# Patient Record
Sex: FEMALE | Race: WHITE | Employment: OTHER | RURAL
[De-identification: names, ages, dates, MRNs, and addresses within clinical notes are randomized per-mention and may not be internally consistent; named-entity substitution may affect disease eponyms.]

---

## 2017-01-19 ENCOUNTER — OFFICE VISIT (OUTPATIENT)
Dept: FAMILY MEDICINE CLINIC | Age: 57
End: 2017-01-19

## 2017-01-19 VITALS
SYSTOLIC BLOOD PRESSURE: 122 MMHG | HEART RATE: 88 BPM | DIASTOLIC BLOOD PRESSURE: 96 MMHG | RESPIRATION RATE: 17 BRPM | BODY MASS INDEX: 42.8 KG/M2 | OXYGEN SATURATION: 97 % | WEIGHT: 265.2 LBS

## 2017-01-19 DIAGNOSIS — J44.9 CHRONIC OBSTRUCTIVE PULMONARY DISEASE, UNSPECIFIED COPD TYPE (HCC): ICD-10-CM

## 2017-01-19 DIAGNOSIS — F32.A ANXIETY AND DEPRESSION: ICD-10-CM

## 2017-01-19 DIAGNOSIS — R03.0 ELEVATED BLOOD PRESSURE (NOT HYPERTENSION): ICD-10-CM

## 2017-01-19 DIAGNOSIS — F41.9 ANXIETY AND DEPRESSION: ICD-10-CM

## 2017-01-19 DIAGNOSIS — F41.9 ANXIETY: ICD-10-CM

## 2017-01-19 DIAGNOSIS — R60.0 LOCALIZED EDEMA: ICD-10-CM

## 2017-01-19 DIAGNOSIS — J44.1 CHRONIC OBSTRUCTIVE PULMONARY DISEASE WITH ACUTE EXACERBATION (HCC): ICD-10-CM

## 2017-01-19 DIAGNOSIS — R00.2 PALPITATION: ICD-10-CM

## 2017-01-19 DIAGNOSIS — E55.9 VITAMIN D DEFICIENCY: ICD-10-CM

## 2017-01-19 DIAGNOSIS — F51.01 PRIMARY INSOMNIA: ICD-10-CM

## 2017-01-19 DIAGNOSIS — F32.A DEPRESSION, UNSPECIFIED DEPRESSION TYPE: ICD-10-CM

## 2017-01-19 DIAGNOSIS — R35.0 URINARY FREQUENCY: Primary | ICD-10-CM

## 2017-01-19 DIAGNOSIS — R79.89 ELEVATED FERRITIN: ICD-10-CM

## 2017-01-19 DIAGNOSIS — E88.81 METABOLIC SYNDROME: ICD-10-CM

## 2017-01-19 DIAGNOSIS — F41.0 PANIC ANXIETY SYNDROME: ICD-10-CM

## 2017-01-19 DIAGNOSIS — J45.909 ASTHMA DEPENDENT ON SYSTEMIC STEROIDS: ICD-10-CM

## 2017-01-19 DIAGNOSIS — Z79.52 ASTHMA DEPENDENT ON SYSTEMIC STEROIDS: ICD-10-CM

## 2017-01-19 LAB
BILIRUB UR QL STRIP: NORMAL
GLUCOSE UR-MCNC: NEGATIVE MG/DL
KETONES P FAST UR STRIP-MCNC: NEGATIVE MG/DL
PH UR STRIP: 6 [PH] (ref 4.6–8)
PROT UR QL STRIP: NEGATIVE MG/DL
SP GR UR STRIP: 1.03 (ref 1–1.03)
UA UROBILINOGEN AMB POC: NORMAL (ref 0.2–1)
URINALYSIS CLARITY POC: NORMAL
URINALYSIS COLOR POC: NORMAL
URINE BLOOD POC: NEGATIVE
URINE LEUKOCYTES POC: NEGATIVE
URINE NITRITES POC: NEGATIVE

## 2017-01-19 RX ORDER — DILTIAZEM HYDROCHLORIDE 120 MG/1
CAPSULE, COATED, EXTENDED RELEASE ORAL
Refills: 0 | COMMUNITY
Start: 2016-10-21 | End: 2017-03-13

## 2017-01-19 RX ORDER — PHENAZOPYRIDINE HYDROCHLORIDE 200 MG/1
TABLET, FILM COATED ORAL
COMMUNITY
End: 2017-01-19 | Stop reason: SDUPTHER

## 2017-01-19 RX ORDER — ZOLPIDEM TARTRATE 5 MG/1
5 TABLET ORAL
Qty: 30 TAB | Refills: 2 | Status: SHIPPED | OUTPATIENT
Start: 2017-01-19 | End: 2017-08-04 | Stop reason: DRUGHIGH

## 2017-01-19 RX ORDER — NORTRIPTYLINE HYDROCHLORIDE 25 MG/1
CAPSULE ORAL
Refills: 0 | COMMUNITY
Start: 2016-11-28 | End: 2017-08-04 | Stop reason: SDUPTHER

## 2017-01-19 RX ORDER — HYDROCHLOROTHIAZIDE 12.5 MG/1
12.5 TABLET ORAL DAILY
Qty: 30 TAB | Refills: 2 | Status: SHIPPED | OUTPATIENT
Start: 2017-01-19 | End: 2017-01-19 | Stop reason: SDUPTHER

## 2017-01-19 RX ORDER — ALPRAZOLAM 0.5 MG/1
0.5 TABLET ORAL
Qty: 30 TAB | Refills: 2 | Status: SHIPPED | OUTPATIENT
Start: 2017-01-19 | End: 2017-06-27 | Stop reason: SDUPTHER

## 2017-01-19 RX ORDER — LORAZEPAM 1 MG/1
TABLET ORAL
Refills: 0 | COMMUNITY
Start: 2016-10-25 | End: 2017-02-20

## 2017-01-19 NOTE — MR AVS SNAPSHOT
Visit Information Date & Time Provider Department Dept. Phone Encounter #  
 1/19/2017 10:30 AM Sai Ashley, NP [address] [facility] [id number] Upcoming Health Maintenance Date Due Hepatitis C Screening 1960 COLONOSCOPY 12/4/1977 DTaP/Tdap/Td series (1 - Tdap) 12/4/1980 PAP AKA CERVICAL CYTOLOGY 12/4/1980 BREAST CANCER SCRN MAMMOGRAM 9/22/2017 Allergies as of 1/19/2017  Review Complete On: 1/19/2017 By: Figueroa Hennessy Severity Noted Reaction Type Reactions Pcn [Penicillins] High 11/14/2014    Rash Griseofulvin  11/14/2014    Other (comments) Aaron-dirk syndrome Pcn [Penicillins]  03/15/2013    Rash, Swelling Tramadol  08/19/2015    Nausea Only, Drowsiness Current Immunizations  Reviewed on 9/28/2016 Name Date Influenza High Dose Vaccine PF 9/28/2016, 9/24/2015 Pneumococcal Conjugate (PCV-13) 8/26/2015 Not reviewed this visit You Were Diagnosed With   
  
 Codes Comments Urinary frequency    -  Primary ICD-10-CM: R35.0 ICD-9-CM: 788.41 Vitamin D deficiency     ICD-10-CM: E55.9 ICD-9-CM: 268.9 Localized edema     ICD-10-CM: R60.0 ICD-9-CM: 166. 3 Elevated blood pressure (not hypertension)     ICD-10-CM: R03.0 ICD-9-CM: 796.2 Palpitation     ICD-10-CM: R00.2 ICD-9-CM: 785.1 Elevated ferritin     ICD-10-CM: R79.89 ICD-9-CM: 790.6 Metabolic syndrome     EHJ-09-OQ: F60.06 ICD-9-CM: 277.7 Vitals BP Pulse Resp Weight(growth percentile) SpO2 BMI  
 (!) 122/96 (BP 1 Location: Right arm, BP Patient Position: Sitting) 88 17 265 lb 3.2 oz (120.3 kg) 97% 42.8 kg/m2 OB Status Smoking Status Menopause Never Smoker Vitals History BMI and BSA Data Body Mass Index Body Surface Area  
 42.8 kg/m 2 2.37 m 2 Preferred Pharmacy Pharmacy Name Phone  [address], [address] [facility] DIMITRIS Benavides [phone] Your Updated Medication List  
  
   
This list is accurate as of: 1/19/17 11:48 AM.  Always use your most recent med list.  
  
  
  
  
 albuterol 90 mcg/actuation inhaler Commonly known as:  PROAIR HFA Take 2 Puffs by inhalation every four (4) hours as needed for Wheezing. albuterol-ipratropium 2.5 mg-0.5 mg/3 ml Nebu Commonly known as:  DUO-NEB  
3 mL by Nebulization route every four (4) hours. Indications: CHRONIC OBSTRUCTIVE PULMONARY DISEASE WITH BRONCHOSPASMS ALPRAZolam 0.5 mg tablet Commonly known as:  Mauricio Beecham Take 1 Tab by mouth nightly as needed for Anxiety. Max Daily Amount: 0.5 mg.  
  
 celecoxib 200 mg capsule Commonly known as:  CELEBREX 1 pill bid with food  
  
 cyclobenzaprine 10 mg tablet Commonly known as:  FLEXERIL Take 1 Tab by mouth three (3) times daily as needed for Muscle Spasm(s). dilTIAZem  mg ER capsule Commonly known as:  CARDIZEM CD  
take 1 capsule by mouth once daily  
  
 ergocalciferol 50,000 unit capsule Commonly known as:  ERGOCALCIFEROL Take 1 Cap by mouth every seven (7) days. esomeprazole 40 mg capsule Commonly known as:  Janeice [place] Take 1 Cap by mouth daily. hydroCHLOROthiazide 12.5 mg tablet Commonly known as:  HYDRODIURIL Take 1 Tab by mouth daily. inhalational spacing device 1 Each by Does Not Apply route as needed. LORazepam 1 mg tablet Commonly known as:  ATIVAN  
take 1 tablet by mouth 2 hours prior to procedure then may repeat. ..  (REFER TO PRESCRIPTION NOTES). mometasone-formoterol 200-5 mcg/actuation HFA inhaler Commonly known as:  Alverna Cable Take 2 Puffs by inhalation two (2) times a day. NORCO 7.5-325 mg per tablet Generic drug:  HYDROcodone-acetaminophen Take 1 Tab by mouth every six (6) hours as needed for Pain. nortriptyline 25 mg capsule Commonly known as:  PAMELOR  
 take 1 to 2 tablets by mouth at bedtime if needed Peak Flow Meter Karina  
1 Device by Does Not Apply route daily. sucralfate 100 mg/mL suspension Commonly known as:  Maria R Kirks Take 5 mL by mouth four (4) times daily. venlafaxine- mg capsule Commonly known as:  EFFEXOR-XR Take 1 Cap by mouth daily. Indications: nerves  
  
 zolpidem 5 mg tablet Commonly known as:  AMBIEN Take 1 Tab by mouth nightly as needed for Sleep. Max Daily Amount: 5 mg. Prescriptions Sent to Pharmacy Refills  
 hydroCHLOROthiazide (HYDRODIURIL) 12.5 mg tablet 2 Sig: Take 1 Tab by mouth daily. Class: Normal  
 Pharmacy: [facility] [place] [address], [facility] [address] Ph #: [phone] Route: Oral  
  
We Performed the Following AMB POC URINALYSIS DIP STICK AUTO W/O MICRO [43634 CPT(R)] CBC WITH AUTOMATED DIFF [63851 CPT(R)] FERRITIN [22908 CPT(R)] HEMOGLOBIN A1C WITH EAG [36040 CPT(R)] LIPID PANEL [16399 CPT(R)] METABOLIC PANEL, COMPREHENSIVE [68568 CPT(R)] NE COLLECTION VENOUS BLOOD,VENIPUNCTURE [id number] CPT(R)] REFERRAL TO UROLOGY [DBN678 Custom] Comments:  
 Please evaluate patient for interstitial cystitis. Recurrent dysuria Antonio [place] VITAMIN D, 25 HYDROXY [id number] CPT(R)] Referral Information Referral ID Referred By Referred To  
  
 [id number] Aleah Sheets [facility] [address] [facility], [address] Visits Status Start Date End Date 1 New Request 1/19/17 1/19/18 If your referral has a status of pending review or denied, additional information will be sent to support the outcome of this decision. Introducing \[facility] [place] [facility]\"" & HEALTH SERVICES! Dear Randee Osler: Thank you for requesting a [employer] account. Our records indicate that you already have an active [employer] account.   You can access your account anytime at [email or web address] Did you know that you can access your hospital and ER discharge instructions at any time in [facility]? You can also review all of your test results from your hospital stay or ER visit. Additional Information If you have questions, please visit the Frequently Asked Questions section of the [facility] website at [email or web address] Remember, [facility] is NOT to be used for urgent needs. For medical emergencies, dial 911. Now available from your iPhone and Android! Please provide this summary of care documentation to your next provider. Your primary care clinician is listed as Deirdre German. If you have any questions after today's visit, please call [phone].

## 2017-01-20 LAB
25(OH)D3+25(OH)D2 SERPL-MCNC: 8.7 NG/ML (ref 30–100)
ALBUMIN SERPL-MCNC: 4.4 G/DL (ref 3.5–5.5)
ALBUMIN/GLOB SERPL: 1.8 {RATIO} (ref 1.1–2.5)
ALP SERPL-CCNC: 99 IU/L (ref 39–117)
ALT SERPL-CCNC: 28 IU/L (ref 0–32)
AST SERPL-CCNC: 33 IU/L (ref 0–40)
BASOPHILS # BLD AUTO: 0.1 X10E3/UL (ref 0–0.2)
BASOPHILS NFR BLD AUTO: 1 %
BILIRUB SERPL-MCNC: 0.3 MG/DL (ref 0–1.2)
BUN SERPL-MCNC: 10 MG/DL (ref 6–24)
BUN/CREAT SERPL: 13 (ref 9–23)
CALCIUM SERPL-MCNC: 9.4 MG/DL (ref 8.7–10.2)
CHLORIDE SERPL-SCNC: 102 MMOL/L (ref 96–106)
CHOLEST SERPL-MCNC: 232 MG/DL (ref 100–199)
CO2 SERPL-SCNC: 21 MMOL/L (ref 18–29)
CREAT SERPL-MCNC: 0.8 MG/DL (ref 0.57–1)
EOSINOPHIL # BLD AUTO: 0.5 X10E3/UL (ref 0–0.4)
EOSINOPHIL NFR BLD AUTO: 7 %
ERYTHROCYTE [DISTWIDTH] IN BLOOD BY AUTOMATED COUNT: 14.8 % (ref 12.3–15.4)
EST. AVERAGE GLUCOSE BLD GHB EST-MCNC: 114 MG/DL
FERRITIN SERPL-MCNC: 15 NG/ML (ref 15–150)
GLOBULIN SER CALC-MCNC: 2.5 G/DL (ref 1.5–4.5)
GLUCOSE SERPL-MCNC: 78 MG/DL (ref 65–99)
HBA1C MFR BLD: 5.6 % (ref 4.8–5.6)
HCT VFR BLD AUTO: 45.9 % (ref 34–46.6)
HDLC SERPL-MCNC: 79 MG/DL
HGB BLD-MCNC: 15.3 G/DL (ref 11.1–15.9)
IMM GRANULOCYTES # BLD: 0 X10E3/UL (ref 0–0.1)
IMM GRANULOCYTES NFR BLD: 0 %
LDLC SERPL CALC-MCNC: 136 MG/DL (ref 0–99)
LYMPHOCYTES # BLD AUTO: 1.8 X10E3/UL (ref 0.7–3.1)
LYMPHOCYTES NFR BLD AUTO: 27 %
MCH RBC QN AUTO: 28.7 PG (ref 26.6–33)
MCHC RBC AUTO-ENTMCNC: 33.3 G/DL (ref 31.5–35.7)
MCV RBC AUTO: 86 FL (ref 79–97)
MONOCYTES # BLD AUTO: 0.4 X10E3/UL (ref 0.1–0.9)
MONOCYTES NFR BLD AUTO: 6 %
NEUTROPHILS # BLD AUTO: 4 X10E3/UL (ref 1.4–7)
NEUTROPHILS NFR BLD AUTO: 59 %
PLATELET # BLD AUTO: 353 X10E3/UL (ref 150–379)
POTASSIUM SERPL-SCNC: 4.9 MMOL/L (ref 3.5–5.2)
PROT SERPL-MCNC: 6.9 G/DL (ref 6–8.5)
RBC # BLD AUTO: 5.34 X10E6/UL (ref 3.77–5.28)
SODIUM SERPL-SCNC: 146 MMOL/L (ref 134–144)
TRIGL SERPL-MCNC: 83 MG/DL (ref 0–149)
VLDLC SERPL CALC-MCNC: 17 MG/DL (ref 5–40)
WBC # BLD AUTO: 6.8 X10E3/UL (ref 3.4–10.8)

## 2017-01-20 RX ORDER — ERGOCALCIFEROL 1.25 MG/1
50000 CAPSULE ORAL
Qty: 14 CAP | Refills: 1 | Status: SHIPPED | OUTPATIENT
Start: 2017-01-20 | End: 2017-03-13 | Stop reason: SDUPTHER

## 2017-01-20 RX ORDER — PHENAZOPYRIDINE HYDROCHLORIDE 200 MG/1
200 TABLET, FILM COATED ORAL
Qty: 90 TAB | Refills: 3 | Status: SHIPPED | OUTPATIENT
Start: 2017-01-20 | End: 2017-02-20

## 2017-01-20 RX ORDER — HYDROCHLOROTHIAZIDE 12.5 MG/1
12.5 TABLET ORAL DAILY
Qty: 30 TAB | Refills: 2 | Status: SHIPPED | OUTPATIENT
Start: 2017-01-20 | End: 2017-03-13

## 2017-01-21 NOTE — PROGRESS NOTES
1/23/2017    Chief Complaint   Patient presents with    Medication Refill    Bladder Infection     thinks she may have a bladder infection. HPI: Dilshad Crouch is a 61 y.o. female. Previous patient Jo Ann Francois. Presents with recurrent episode of dysuria. Urine today is negative. Review of labs indicate that she has had 2 UA dipsticks and 1 culture over the past 3 years. I do not find documentation of frequent UTIs. In fact the Urine culture done in 1/2016 was negative - \"no growth\". She needs refills of several of her medications. Complicated PMH:  Asthma/COPD - Severe Steroid dependent. Panic disorder  Depression  Fibromyalgia   Morbid obesity with history of Gastric bypass surgery. Anemia    Allergies   Allergen Reactions    Pcn [Penicillins] Rash    Griseofulvin Other (comments)     Aaron-dirk syndrome    Pcn [Penicillins] Rash and Swelling    Tramadol Nausea Only and Drowsiness       Current Outpatient Prescriptions   Medication Sig Dispense Refill    venlafaxine-SR (EFFEXOR-XR) 150 mg capsule Take 1 Cap by mouth daily. Indications: nerves 90 Cap 1    albuterol (PROAIR HFA) 90 mcg/actuation inhaler Take 2 Puffs by inhalation every four (4) hours as needed for Wheezing. 3 Inhaler 3    nortriptyline (PAMELOR) 25 mg capsule take 1 to 2 tablets by mouth at bedtime if needed  0    dilTIAZem CD (CARDIZEM CD) 120 mg ER capsule take 1 capsule by mouth once daily  0    LORazepam (ATIVAN) 1 mg tablet take 1 tablet by mouth 2 hours prior to procedure then may repeat. ..  (REFER TO PRESCRIPTION NOTES). 0    HYDROcodone-acetaminophen (NORCO) 7.5-325 mg per tablet Take 1 Tab by mouth every six (6) hours as needed for Pain.  celecoxib (CELEBREX) 200 mg capsule 1 pill bid with food 60 Cap 1    esomeprazole (NEXIUM) 40 mg capsule Take 1 Cap by mouth daily. 30 Cap 2    mometasone-formoterol (DULERA) 200-5 mcg/actuation HFA inhaler Take 2 Puffs by inhalation two (2) times a day.  3 Inhaler 3    inhalational spacing device 1 Each by Does Not Apply route as needed. 1 Device 0    Peak Flow Meter hussain 1 Device by Does Not Apply route daily. 1 Device 0    hydroCHLOROthiazide (HYDRODIURIL) 12.5 mg tablet Take 1 Tab by mouth daily. 30 Tab 2    ergocalciferol (ERGOCALCIFEROL) 50,000 unit capsule Take 1 Cap by mouth every seven (7) days. 14 Cap 1    phenazopyridine (PYRIDIUM) 200 mg tablet Take 1 Tab by mouth three (3) times daily as needed for Pain. 90 Tab 3    ALPRAZolam (XANAX) 0.5 mg tablet Take 1 Tab by mouth nightly as needed for Anxiety. Max Daily Amount: 0.5 mg. 30 Tab 2    zolpidem (AMBIEN) 5 mg tablet Take 1 Tab by mouth nightly as needed for Sleep. Max Daily Amount: 5 mg. 30 Tab 2    sucralfate (CARAFATE) 100 mg/mL suspension Take 5 mL by mouth four (4) times daily. 600 mL 5    cyclobenzaprine (FLEXERIL) 10 mg tablet Take 1 Tab by mouth three (3) times daily as needed for Muscle Spasm(s). 90 Tab 2    albuterol-ipratropium (DUO-NEB) 2.5 mg-0.5 mg/3 ml nebulizer solution 3 mL by Nebulization route every four (4) hours. Indications: CHRONIC OBSTRUCTIVE PULMONARY DISEASE WITH BRONCHOSPASMS 30 Vial 11       Past Medical History   Diagnosis Date    Anemia 3/15/2013    Arrhythmia      paroxsimal afib    Asthma 3/15/2013    Asthma     Back disorder      disc problem    Chronic obstructive pulmonary disease (HCC)     Morbid obesity ([facility] [address] 3/15/2013       Lab Results   Component Value Date/Time    Hemoglobin A1c 5.6 01/19/2017 11:53 AM    Hemoglobin A1c 6.0 06/11/2015 03:16 PM    Glucose 78 01/19/2017 11:53 AM    LDL, calculated 136 01/19/2017 11:53 AM    Creatinine 0.80 01/19/2017 11:53 AM       ROS:  Constitutional: No fever, chills or weight loss  Respiratory: No cough, SOB   CV: No chest pain or Palpitations  GI: No nausea, vomiting or diarrhea. : No dysuria or hematuria. Neuro: No headaches, seizures, change in mental status.     Physical Exam:   VS Visit Vitals    BP (!) 122/96 (BP 1 Location: Right arm, BP Patient Position: Sitting)    Pulse 88    Resp 17    Wt 265 lb 3.2 oz (120.3 kg)    SpO2 97%    BMI 42.8 kg/m2      General  Alert, oriented obese WF BMI 42.8. NAD   Eyes Conjunctiva and lids normal.    PERRLA, EOMI.   ENMT Mucous membranes moist.    Oropharynx: no erythema, no exudates, no lesions, normal tongue. NECK Thyroid: normal size, nontender. Trachea midline, neck symmetrical and without masses. Carotids 2+ with no bruits. No enlarged nodes. RESP Clear to auscultation and percussion. No rales, wheezes, rhonchi, or rubs. CV RRR, with no S3 or S4, no murmur, no rub. EXT Extremities without edema. SKIN Skin warm, normal turgor. NEURO Cranial nerves normal 2-12. No abnormal movement   PSYCH Judgment and insight fair. Oriented to person, place, and time. Affect is alert and attentive. 1. Urinary frequency/Recurrent Dysuria   Urine is negative today. I cannot find any documentation of UTI in past year. Consider possible Interstitial Cystitis: Refer Dr. Karli Wiley     - Shaunna Crow DIFF  - METABOLIC PANEL, COMPREHENSIVE  - WA COLLECTION VENOUS BLOOD,VENIPUNCTURE  - REFERRAL TO UROLOGY    2. Vitamin D deficiency  Not on meds. Start high dose Viyt D.  - METABOLIC PANEL, COMPREHENSIVE  - VITAMIN D, 25 HYDROXY  - WA COLLECTION VENOUS BLOOD,VENIPUNCTURE    3. Localized edema  None present today. Check labs. - METABOLIC PANEL, COMPREHENSIVE  - WA COLLECTION VENOUS BLOOD,VENIPUNCTURE    4. Elevated blood pressure (not hypertension)  Diastolic high today. Check labs  Continue current regimen  - CBC WITH AUTOMATED DIFF  - LIPID PANEL  - HEMOGLOBIN A1C WITH EAG  - METABOLIC PANEL, COMPREHENSIVE  - WA COLLECTION VENOUS BLOOD,VENIPUNCTURE    5. Palpitation  Check labs   - METABOLIC PANEL, COMPREHENSIVE  - WA COLLECTION VENOUS BLOOD,VENIPUNCTURE    6.  Elevated ferritin  Check labs   - FERRITIN  - METABOLIC PANEL, COMPREHENSIVE  - NH COLLECTION VENOUS BLOOD,VENIPUNCTURE    7. Metabolic syndrome  Check labs  - HEMOGLOBIN A1C WITH EAG  - NH COLLECTION VENOUS BLOOD,VENIPUNCTURE    8. Asthma/COPD  Severe steroid dependent  Refill albuterol inhaler. 9. Depression/ Anxiety  Refill Venlefaxine SR 150mg   #90 +3            Orders Placed This Encounter    CBC WITH AUTOMATED DIFF    FERRITIN    LIPID PANEL    HEMOGLOBIN A1C WITH EAG    METABOLIC PANEL, COMPREHENSIVE    VITAMIN D, 25 HYDROXY    REFERRAL TO UROLOGY     Referral Priority:   Routine     Referral Type:   Consultation     Referral Reason:   Specialty Services Required     Referral Location:   [facility]     Referred to Provider:   Pierce Watts MD    AMB POC URINALYSIS DIP STICK AUTO W/O MICRO    NH COLLECTION VENOUS BLOOD,VENIPUNCTURE    nortriptyline (PAMELOR) 25 mg capsule     Sig: take 1 to 2 tablets by mouth at bedtime if needed     Refill:  0    dilTIAZem CD (CARDIZEM CD) 120 mg ER capsule     Sig: take 1 capsule by mouth once daily     Refill:  0    LORazepam (ATIVAN) 1 mg tablet     Sig: take 1 tablet by mouth 2 hours prior to procedure then may repeat. ..  (REFER TO PRESCRIPTION NOTES). Refill:  0    DISCONTD: hydroCHLOROthiazide (HYDRODIURIL) 12.5 mg tablet     Sig: Take 1 Tab by mouth daily. Dispense:  30 Tab     Refill:  2    DISCONTD: phenazopyridine (PYRIDIUM) 200 mg tablet     Sig: Take  by mouth three (3) times daily as needed for Pain.  venlafaxine-SR (EFFEXOR-XR) 150 mg capsule     Sig: Take 1 Cap by mouth daily. Indications: nerves     Dispense:  90 Cap     Refill:  1    albuterol (PROAIR HFA) 90 mcg/actuation inhaler     Sig: Take 2 Puffs by inhalation every four (4) hours as needed for Wheezing. Dispense:  3 Inhaler     Refill:  3       Follow-up Disposition:  Return in about 2 weeks (around 2/2/2017).         DIYA Landrum

## 2017-01-23 RX ORDER — VENLAFAXINE HYDROCHLORIDE 150 MG/1
150 CAPSULE, EXTENDED RELEASE ORAL DAILY
Qty: 90 CAP | Refills: 1 | Status: SHIPPED | OUTPATIENT
Start: 2017-01-23 | End: 2017-03-08

## 2017-01-23 RX ORDER — ALBUTEROL SULFATE 90 UG/1
2 AEROSOL, METERED RESPIRATORY (INHALATION)
Qty: 3 INHALER | Refills: 3 | Status: ON HOLD | OUTPATIENT
Start: 2017-01-23 | End: 2017-03-24

## 2017-01-23 NOTE — PROGRESS NOTES
Abnormals: Total Cholesterol elevated 232 (high); HDL 79 (excellent); Vit D very low 8.7. You were started on Vit D 50,000 units once a week. Should recheck Vit in 6 mo. The A1C is 5.6% which is excellent, in normal range. 1 Year ago it was 6.0% which was pre-diabetic. You are no longer in the pre-diabetic range. Continue diet, exercise and wt loss. Although your cholesterol panel is a little high you do not need lipid lowering medication yet. However, over time you will need it. So continue low fat diet, low cholesterol diet. Limit saturated fats, cheese, sour cream, cream cheese, red meats. Avoid fried foods. Lean protein. Recheck labs in 3 mo.

## 2017-01-24 ENCOUNTER — TELEPHONE (OUTPATIENT)
Dept: FAMILY MEDICINE CLINIC | Age: 57
End: 2017-01-24

## 2017-01-24 RX ORDER — VENLAFAXINE HYDROCHLORIDE 150 MG/1
150 CAPSULE, EXTENDED RELEASE ORAL DAILY
Qty: 90 CAP | Refills: 1 | Status: SHIPPED | OUTPATIENT
Start: 2017-01-24 | End: 2017-03-13 | Stop reason: ALTCHOICE

## 2017-01-24 RX ORDER — ALBUTEROL SULFATE 90 UG/1
2 AEROSOL, METERED RESPIRATORY (INHALATION)
Qty: 3 INHALER | Refills: 3 | Status: SHIPPED | OUTPATIENT
Start: 2017-01-24

## 2017-01-24 NOTE — TELEPHONE ENCOUNTER
Kane Le called. [facility] has not called in her meds from the other day. Also , she is returning Rei's phone call. Wants [facility] to call her please.

## 2017-02-20 ENCOUNTER — OFFICE VISIT (OUTPATIENT)
Dept: FAMILY MEDICINE CLINIC | Age: 57
End: 2017-02-20

## 2017-02-20 VITALS
SYSTOLIC BLOOD PRESSURE: 118 MMHG | DIASTOLIC BLOOD PRESSURE: 88 MMHG | WEIGHT: 256.5 LBS | HEART RATE: 87 BPM | RESPIRATION RATE: 17 BRPM | OXYGEN SATURATION: 95 % | BODY MASS INDEX: 41.4 KG/M2

## 2017-02-20 DIAGNOSIS — R03.0 ELEVATED BLOOD PRESSURE (NOT HYPERTENSION): ICD-10-CM

## 2017-02-20 DIAGNOSIS — J44.1 CHRONIC OBSTRUCTIVE PULMONARY DISEASE WITH ACUTE EXACERBATION (HCC): Primary | ICD-10-CM

## 2017-02-20 NOTE — MR AVS SNAPSHOT
Visit Information Date & Time Provider Department Dept. Phone Encounter #  
 2/20/2017  1:30 PM Jeff Alan NP [address] [phone] [id number] Upcoming Health Maintenance Date Due Hepatitis C Screening 1960 COLONOSCOPY 12/4/1977 DTaP/Tdap/Td series (1 - Tdap) 12/4/1980 PAP AKA CERVICAL CYTOLOGY 12/4/1980 BREAST CANCER SCRN MAMMOGRAM 9/22/2017 Allergies as of 2/20/2017  Review Complete On: 2/20/2017 By: Braeden Benavides Severity Noted Reaction Type Reactions Pcn [Penicillins] High 11/14/2014    Rash Griseofulvin  11/14/2014    Other (comments) Aaron-dirk syndrome Pcn [Penicillins]  03/15/2013    Rash, Swelling Tramadol  08/19/2015    Nausea Only, Drowsiness Current Immunizations  Reviewed on 9/28/2016 Name Date Influenza High Dose Vaccine PF 9/28/2016, 9/24/2015 Pneumococcal Conjugate (PCV-13) 8/26/2015 Not reviewed this visit Vitals BP Pulse Resp Weight(growth percentile) SpO2 BMI  
 118/88 (BP 1 Location: Left arm, BP Patient Position: Sitting) 87 17 256 lb 8 oz (116.3 kg) 95% 41.4 kg/m2 OB Status Smoking Status Menopause Never Smoker Vitals History BMI and BSA Data Body Mass Index Body Surface Area  
 41.4 kg/m 2 2.33 m 2 Preferred Pharmacy Pharmacy Name Phone [facility] [place] [address], [place]  [address] [phone] Your Updated Medication List  
  
   
This list is accurate as of: 2/20/17  3:19 PM.  Always use your most recent med list.  
  
  
  
  
 * albuterol 90 mcg/actuation inhaler Commonly known as:  PROAIR HFA Take 2 Puffs by inhalation every four (4) hours as needed for Wheezing. * albuterol 90 mcg/actuation inhaler Commonly known as:  PROAIR HFA Take 2 Puffs by inhalation every four (4) hours as needed for Wheezing. albuterol-ipratropium 2.5 mg-0.5 mg/3 ml Nebu Commonly known as:  DUO-NEB  
3 mL by Nebulization route every four (4) hours. Indications: CHRONIC OBSTRUCTIVE PULMONARY DISEASE WITH BRONCHOSPASMS ALPRAZolam 0.5 mg tablet Commonly known as:  Mahesh Elvia Take 1 Tab by mouth nightly as needed for Anxiety. Max Daily Amount: 0.5 mg.  
  
 celecoxib 200 mg capsule Commonly known as:  CELEBREX 1 pill bid with food  
  
 cyclobenzaprine 10 mg tablet Commonly known as:  FLEXERIL Take 1 Tab by mouth three (3) times daily as needed for Muscle Spasm(s). dilTIAZem  mg ER capsule Commonly known as:  CARDIZEM CD  
take 1 capsule by mouth once daily  
  
 ergocalciferol 50,000 unit capsule Commonly known as:  ERGOCALCIFEROL Take 1 Cap by mouth every seven (7) days. esomeprazole 40 mg capsule Commonly known as:  Lafrances Edgar Take 1 Cap by mouth daily. hydroCHLOROthiazide 12.5 mg tablet Commonly known as:  HYDRODIURIL Take 1 Tab by mouth daily. inhalational spacing device 1 Each by Does Not Apply route as needed. mometasone-formoterol 200-5 mcg/actuation HFA inhaler Commonly known as:  Sandy Claudy Take 2 Puffs by inhalation two (2) times a day. NORCO 7.5-325 mg per tablet Generic drug:  HYDROcodone-acetaminophen Take 1 Tab by mouth every six (6) hours as needed for Pain. nortriptyline 25 mg capsule Commonly known as:  PAMELOR  
take 1 to 2 tablets by mouth at bedtime if needed Peak Flow Meter Karina  
1 Device by Does Not Apply route daily. sucralfate 100 mg/mL suspension Commonly known as:  Charm Ceferino Take 5 mL by mouth four (4) times daily. * venlafaxine- mg capsule Commonly known as:  EFFEXOR-XR Take 1 Cap by mouth daily. Indications: nerves * venlafaxine- mg capsule Commonly known as:  EFFEXOR-XR Take 1 Cap by mouth daily. Indications: nerves  
  
 zolpidem 5 mg tablet Commonly known as:  AMBIEN  
 Take 1 Tab by mouth nightly as needed for Sleep. Max Daily Amount: 5 mg.  
  
 * Notice: This list has 4 medication(s) that are the same as other medications prescribed for you. Read the directions carefully, and ask your doctor or other care provider to review them with you. Introducing [facility] SERVICES! Dear Patti Harris: Thank you for requesting a [employer] account. Our records indicate that you already have an active [employer] account. You can access your account anytime at [email or web address] Did you know that you can access your hospital and ER discharge instructions at any time in [employer]? You can also review all of your test results from your hospital stay or ER visit. Additional Information If you have questions, please visit the Frequently Asked Questions section of the [employer] website at [email or web address] Remember, [employer] is NOT to be used for urgent needs. For medical emergencies, dial 911. Now available from your iPhone and Android! Please provide this summary of care documentation to your next provider. Your primary care clinician is listed as Camilo Madrigal. If you have any questions after today's visit, please call [phone].

## 2017-02-21 NOTE — PROGRESS NOTES
2/21/2017    Chief Complaint   Patient presents with    Anemia       HPI: Stacy Drummond is a 61 y.o. female. 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 Currently on medical leave of absence for severe asthma. Seeing pulmonologist who is considering starting her on a new monoclonal antibody which targets Eosinophils. She had some questions which we discussed. She also has questions      Allergies   Allergen Reactions    Pcn [Penicillins] Rash    Griseofulvin Other (comments)     Aaron-dirk syndrome    Pcn [Penicillins] Rash and Swelling    Tramadol Nausea Only and Drowsiness       Current Outpatient Prescriptions   Medication Sig Dispense Refill    venlafaxine-SR (EFFEXOR-XR) 150 mg capsule Take 1 Cap by mouth daily. Indications: nerves 90 Cap 1    albuterol (PROAIR HFA) 90 mcg/actuation inhaler Take 2 Puffs by inhalation every four (4) hours as needed for Wheezing. 3 Inhaler 3    venlafaxine-SR (EFFEXOR-XR) 150 mg capsule Take 1 Cap by mouth daily. Indications: nerves 90 Cap 1    albuterol (PROAIR HFA) 90 mcg/actuation inhaler Take 2 Puffs by inhalation every four (4) hours as needed for Wheezing. 3 Inhaler 3    hydroCHLOROthiazide (HYDRODIURIL) 12.5 mg tablet Take 1 Tab by mouth daily. 30 Tab 2    ergocalciferol (ERGOCALCIFEROL) 50,000 unit capsule Take 1 Cap by mouth every seven (7) days. 14 Cap 1    nortriptyline (PAMELOR) 25 mg capsule take 1 to 2 tablets by mouth at bedtime if needed  0    dilTIAZem CD (CARDIZEM CD) 120 mg ER capsule take 1 capsule by mouth once daily  0    ALPRAZolam (XANAX) 0.5 mg tablet Take 1 Tab by mouth nightly as needed for Anxiety. Max Daily Amount: 0.5 mg. 30 Tab 2    HYDROcodone-acetaminophen (NORCO) 7.5-325 mg per tablet Take 1 Tab by mouth every six (6) hours as needed for Pain.  celecoxib (CELEBREX) 200 mg capsule 1 pill bid with food 60 Cap 1    sucralfate (CARAFATE) 100 mg/mL suspension Take 5 mL by mouth four (4) times daily.  600 mL 5    esomeprazole (NEXIUM) 40 mg capsule Take 1 Cap by mouth daily. 30 Cap 2    cyclobenzaprine (FLEXERIL) 10 mg tablet Take 1 Tab by mouth three (3) times daily as needed for Muscle Spasm(s). 90 Tab 2    mometasone-formoterol (DULERA) 200-5 mcg/actuation HFA inhaler Take 2 Puffs by inhalation two (2) times a day. 3 Inhaler 3    albuterol-ipratropium (DUO-NEB) 2.5 mg-0.5 mg/3 ml nebulizer solution 3 mL by Nebulization route every four (4) hours. Indications: CHRONIC OBSTRUCTIVE PULMONARY DISEASE WITH BRONCHOSPASMS 30 Vial 11    inhalational spacing device 1 Each by Does Not Apply route as needed. 1 Device 0    Peak Flow Meter hussain 1 Device by Does Not Apply route daily. 1 Device 0    zolpidem (AMBIEN) 5 mg tablet Take 1 Tab by mouth nightly as needed for Sleep. Max Daily Amount: 5 mg. 30 Tab 2       Past Medical History   Diagnosis Date    Anemia 3/15/2013    Arrhythmia      paroxsimal afib    Asthma 3/15/2013    Asthma     Back disorder      disc problem    Chronic obstructive pulmonary disease (HCC)     Morbid obesity ([facility] [place] [address] 3/15/2013       Lab Results   Component Value Date/Time    Hemoglobin A1c 5.6 01/19/2017 11:53 AM    Hemoglobin A1c 6.0 06/11/2015 03:16 PM    Glucose 78 01/19/2017 11:53 AM    LDL, calculated 136 01/19/2017 11:53 AM    Creatinine 0.80 01/19/2017 11:53 AM       ROS:  Constitutional: No fever, chills or weight loss  Respiratory: No cough, SOB   CV: No chest pain or Palpitations  GI: No nausea, vomiting or diarrhea. : No dysuria or hematuria. Neuro: No headaches, seizures, change in mental status. Physical Exam:   VS Visit Vitals    /88 (BP 1 Location: Left arm, BP Patient Position: Sitting)    Pulse 87    Resp 17    Wt 256 lb 8 oz (116.3 kg)    SpO2 95%    BMI 41.4 kg/m2      Eyes Conjunctiva and lids normal.    PERRLA, EOMI.   ENMT Mucous membranes moist.    Oropharynx: no erythema, no exudates, no lesions, normal tongue. NECK Thyroid: normal size, nontender.   Trachea midline, neck symmetrical and without masses. Carotids 2+ with no bruits. No enlarged nodes. RESP Clear to auscultation and percussion. No rales, wheezes, rhonchi, or rubs. CV RRR, with no S3 or S4, no murmur, no rub. Aorta: no bruit. GI   Normal bowel sounds, no bruit, soft, nontender, without masses. No hepatosplenomegaly. MSKEL Normal gait and station. Normal strength and tone, no atrophy. EXT No deformity. Extremities without edema. DP and PT 2+ bilaterally. SKIN Skin warm, normal turgor. NEURO Cranial nerves normal 2-12. No abnormal movement  Sensation vibration and filament   DTR 2+ in upper and lower extremities. PSYCH Judgment and insight good. Oriented to person, place, and time. Affect is alert and attentive. There are no diagnoses linked to this encounter. No orders of the defined types were placed in this encounter.       Follow-up Disposition: Not on File        Kingston Baker

## 2017-02-22 NOTE — PROGRESS NOTES
3/1/2017    Chief Complaint   Patient presents with    Anemia       HPI: Sam Ochoa is a 61 y.o. female. 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 Seeing pulmonologist who is considering starting her on a new monoclonal antibody which targets Eosinophils. She had some questions which we discussed. She is morbidly obese. 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 Reviewed diet,activity and portion control . Allergies   Allergen Reactions    Pcn [Penicillins] Rash    Griseofulvin Other (comments)     Aaron-dirk syndrome    Pcn [Penicillins] Rash and Swelling    Tramadol Nausea Only and Drowsiness       Current Outpatient Prescriptions   Medication Sig Dispense Refill    venlafaxine-SR (EFFEXOR-XR) 150 mg capsule Take 1 Cap by mouth daily. Indications: nerves 90 Cap 1    albuterol (PROAIR HFA) 90 mcg/actuation inhaler Take 2 Puffs by inhalation every four (4) hours as needed for Wheezing. 3 Inhaler 3    venlafaxine-SR (EFFEXOR-XR) 150 mg capsule Take 1 Cap by mouth daily. Indications: nerves 90 Cap 1    albuterol (PROAIR HFA) 90 mcg/actuation inhaler Take 2 Puffs by inhalation every four (4) hours as needed for Wheezing. 3 Inhaler 3    hydroCHLOROthiazide (HYDRODIURIL) 12.5 mg tablet Take 1 Tab by mouth daily. 30 Tab 2    ergocalciferol (ERGOCALCIFEROL) 50,000 unit capsule Take 1 Cap by mouth every seven (7) days. 14 Cap 1    nortriptyline (PAMELOR) 25 mg capsule take 1 to 2 tablets by mouth at bedtime if needed  0    dilTIAZem CD (CARDIZEM CD) 120 mg ER capsule take 1 capsule by mouth once daily  0    ALPRAZolam (XANAX) 0.5 mg tablet Take 1 Tab by mouth nightly as needed for Anxiety. Max Daily Amount: 0.5 mg. 30 Tab 2    HYDROcodone-acetaminophen (NORCO) 7.5-325 mg per tablet Take 1 Tab by mouth every six (6) hours as needed for Pain.       celecoxib (CELEBREX) 200 mg capsule 1 pill bid with food 60 Cap 1    sucralfate (CARAFATE) 100 mg/mL suspension Take 5 mL by mouth four (4) times daily. 600 mL 5    esomeprazole (NEXIUM) 40 mg capsule Take 1 Cap by mouth daily. 30 Cap 2    cyclobenzaprine (FLEXERIL) 10 mg tablet Take 1 Tab by mouth three (3) times daily as needed for Muscle Spasm(s). 90 Tab 2    mometasone-formoterol (DULERA) 200-5 mcg/actuation HFA inhaler Take 2 Puffs by inhalation two (2) times a day. 3 Inhaler 3    albuterol-ipratropium (DUO-NEB) 2.5 mg-0.5 mg/3 ml nebulizer solution 3 mL by Nebulization route every four (4) hours. Indications: CHRONIC OBSTRUCTIVE PULMONARY DISEASE WITH BRONCHOSPASMS 30 Vial 11    inhalational spacing device 1 Each by Does Not Apply route as needed. 1 Device 0    Peak Flow Meter hussain 1 Device by Does Not Apply route daily. 1 Device 0    zolpidem (AMBIEN) 5 mg tablet Take 1 Tab by mouth nightly as needed for Sleep. Max Daily Amount: 5 mg. 30 Tab 2       Past Medical History:   Diagnosis Date    Anemia 3/15/2013    Arrhythmia     paroxsimal afib    Asthma 3/15/2013    Asthma     Back disorder     disc problem    Chronic obstructive pulmonary disease (HCC)     Morbid obesity ([facility] [address] 3/15/2013       Lab Results   Component Value Date/Time    Hemoglobin A1c 5.6 01/19/2017 11:53 AM    Hemoglobin A1c 6.0 06/11/2015 03:16 PM    Glucose 78 01/19/2017 11:53 AM    LDL, calculated 136 01/19/2017 11:53 AM    Creatinine 0.80 01/19/2017 11:53 AM       ROS:  Constitutional: No fever, chills or weight loss  Respiratory: No cough, SOB   CV: No chest pain or Palpitations  GI: No nausea, vomiting or diarrhea. : No dysuria or hematuria. Neuro: No headaches, seizures, change in mental status.     Physical Exam:   VS Visit Vitals    /88 (BP 1 Location: Left arm, BP Patient Position: Sitting)    Pulse 87    Resp 17    Wt 256 lb 8 oz (116.3 kg)    SpO2 95%    BMI 41.4 kg/m2      Eyes Conjunctiva and lids normal.    PERRLA, EOMI.   ENMT   Lips, teeth, gums normal, mucous membranes moist.    Oropharynx: no erythema, no exudates, no lesions, normal tongue. NECK Thyroid: normal size, nontender. Trachea midline, neck symmetrical and without masses. Carotids 2+ with no bruits. No enlarged nodes. RESP Clear to auscultation and percussion. No rales, wheezes, rhonchi, or rubs. CV RRR, with no S3 or S4, no murmur, no rub. Aorta: no bruit. GI   Normal bowel sounds, no bruit, soft, nontender, without masses. No hepatosplenomegaly. MSKEL Normal gait and station. Normal strength and tone, no atrophy. EXT No deformity. Extremities without edema. DP and PT 2+ bilaterally. SKIN Skin warm, normal turgor. NEURO Cranial nerves normal 2-12. PSYCH Judgment and insight good. Oriented to person, place, and time. Affect is alert and attentive. 1. Chronic obstructive pulmonary disease with acute exacerbation [facility])  Per Pulmonology  Completed FMLA papers. Out of work until after next appt 6/19/17 pulmonology      2. Elevated blood pressure (not hypertension)  BP is normal      Follow-up Disposition:  Return in about 3 months (around 5/20/2017), or if symptoms worsen or fail to improve.         DIYA Grimaldo

## 2017-03-04 ENCOUNTER — HOSPITAL ENCOUNTER (INPATIENT)
Age: 57
LOS: 4 days | Discharge: HOME | DRG: 176 | End: 2017-03-08
Attending: EMERGENCY MEDICINE | Admitting: INTERNAL MEDICINE
Payer: COMMERCIAL

## 2017-03-04 ENCOUNTER — APPOINTMENT (OUTPATIENT)
Dept: CT IMAGING | Age: 57
DRG: 176 | End: 2017-03-04
Attending: EMERGENCY MEDICINE
Payer: COMMERCIAL

## 2017-03-04 ENCOUNTER — APPOINTMENT (OUTPATIENT)
Dept: GENERAL RADIOLOGY | Age: 57
DRG: 176 | End: 2017-03-04
Attending: EMERGENCY MEDICINE
Payer: COMMERCIAL

## 2017-03-04 DIAGNOSIS — R77.8 ELEVATED TROPONIN: ICD-10-CM

## 2017-03-04 DIAGNOSIS — I26.09 OTHER ACUTE PULMONARY EMBOLISM WITH ACUTE COR PULMONALE (HCC): Primary | ICD-10-CM

## 2017-03-04 PROBLEM — I26.99 ACUTE PULMONARY EMBOLISM (HCC): Status: ACTIVE | Noted: 2017-03-04

## 2017-03-04 LAB
ALBUMIN SERPL BCP-MCNC: 3.2 G/DL (ref 3.5–5)
ALBUMIN/GLOB SERPL: 1.1 {RATIO} (ref 1.1–2.2)
ALP SERPL-CCNC: 84 U/L (ref 45–117)
ALT SERPL-CCNC: 16 U/L (ref 12–78)
ANION GAP BLD CALC-SCNC: 8 MMOL/L (ref 5–15)
APPEARANCE UR: CLEAR
AST SERPL W P-5'-P-CCNC: 17 U/L (ref 15–37)
BACTERIA URNS QL MICRO: NEGATIVE /HPF
BASOPHILS # BLD AUTO: 0.1 K/UL (ref 0–0.1)
BASOPHILS # BLD: 1 % (ref 0–1)
BILIRUB SERPL-MCNC: 0.4 MG/DL (ref 0.2–1)
BILIRUB UR QL: NEGATIVE
BUN SERPL-MCNC: 9 MG/DL (ref 6–20)
BUN/CREAT SERPL: 9 (ref 12–20)
CALCIUM SERPL-MCNC: 8.9 MG/DL (ref 8.5–10.1)
CHLORIDE SERPL-SCNC: 108 MMOL/L (ref 97–108)
CK MB CFR SERPL CALC: 5.5 % (ref 0–2.5)
CK MB CFR SERPL CALC: 6.1 % (ref 0–2.5)
CK MB SERPL-MCNC: 3 NG/ML (ref 5–25)
CK MB SERPL-MCNC: 3 NG/ML (ref 5–25)
CK SERPL-CCNC: 49 U/L (ref 26–192)
CK SERPL-CCNC: 55 U/L (ref 26–192)
CO2 SERPL-SCNC: 27 MMOL/L (ref 21–32)
COLOR UR: NORMAL
CREAT SERPL-MCNC: 0.96 MG/DL (ref 0.55–1.02)
D DIMER PPP FEU-MCNC: 4.77 MG/L FEU (ref 0–0.65)
EOSINOPHIL # BLD: 0.4 K/UL (ref 0–0.4)
EOSINOPHIL NFR BLD: 5 % (ref 0–7)
EPITH CASTS URNS QL MICRO: NORMAL /LPF
ERYTHROCYTE [DISTWIDTH] IN BLOOD BY AUTOMATED COUNT: 14.2 % (ref 11.5–14.5)
FIBRINOGEN PPP-MCNC: 353 MG/DL (ref 200–475)
GLOBULIN SER CALC-MCNC: 2.9 G/DL (ref 2–4)
GLUCOSE SERPL-MCNC: 98 MG/DL (ref 65–100)
GLUCOSE UR STRIP.AUTO-MCNC: NEGATIVE MG/DL
HCT VFR BLD AUTO: 44.3 % (ref 35–47)
HGB BLD-MCNC: 14 G/DL (ref 11.5–16)
HGB UR QL STRIP: NEGATIVE
KETONES UR QL STRIP.AUTO: NEGATIVE MG/DL
LEUKOCYTE ESTERASE UR QL STRIP.AUTO: NEGATIVE
LYMPHOCYTES # BLD AUTO: 33 % (ref 12–49)
LYMPHOCYTES # BLD: 2.7 K/UL (ref 0.8–3.5)
MCH RBC QN AUTO: 28.3 PG (ref 26–34)
MCHC RBC AUTO-ENTMCNC: 31.6 G/DL (ref 30–36.5)
MCV RBC AUTO: 89.7 FL (ref 80–99)
MONOCYTES # BLD: 0.6 K/UL (ref 0–1)
MONOCYTES NFR BLD AUTO: 7 % (ref 5–13)
NEUTS SEG # BLD: 4.5 K/UL (ref 1.8–8)
NEUTS SEG NFR BLD AUTO: 54 % (ref 32–75)
NITRITE UR QL STRIP.AUTO: NEGATIVE
PH UR STRIP: 6.5 [PH] (ref 5–8)
PLATELET # BLD AUTO: 209 K/UL (ref 150–400)
POTASSIUM SERPL-SCNC: 4.2 MMOL/L (ref 3.5–5.1)
PROT SERPL-MCNC: 6.1 G/DL (ref 6.4–8.2)
PROT UR STRIP-MCNC: NEGATIVE MG/DL
RBC # BLD AUTO: 4.94 M/UL (ref 3.8–5.2)
RBC #/AREA URNS HPF: NORMAL /HPF (ref 0–5)
SODIUM SERPL-SCNC: 143 MMOL/L (ref 136–145)
SP GR UR REFRACTOMETRY: <1.005 (ref 1–1.03)
TROPONIN I SERPL-MCNC: 0.32 NG/ML
TROPONIN I SERPL-MCNC: 0.34 NG/ML
TROPONIN I SERPL-MCNC: 0.34 NG/ML
UA: UC IF INDICATED,UAUC: NORMAL
UROBILINOGEN UR QL STRIP.AUTO: 0.2 EU/DL (ref 0.2–1)
WBC # BLD AUTO: 8.3 K/UL (ref 3.6–11)
WBC URNS QL MICRO: NORMAL /HPF (ref 0–4)

## 2017-03-04 PROCEDURE — 85613 RUSSELL VIPER VENOM DILUTED: CPT | Performed by: INTERNAL MEDICINE

## 2017-03-04 PROCEDURE — 74011250636 HC RX REV CODE- 250/636: Performed by: EMERGENCY MEDICINE

## 2017-03-04 PROCEDURE — 85025 COMPLETE CBC W/AUTO DIFF WBC: CPT | Performed by: EMERGENCY MEDICINE

## 2017-03-04 PROCEDURE — 86038 ANTINUCLEAR ANTIBODIES: CPT | Performed by: INTERNAL MEDICINE

## 2017-03-04 PROCEDURE — 77030029684 HC NEB SM VOL KT MONA -A

## 2017-03-04 PROCEDURE — 36415 COLL VENOUS BLD VENIPUNCTURE: CPT | Performed by: EMERGENCY MEDICINE

## 2017-03-04 PROCEDURE — 71275 CT ANGIOGRAPHY CHEST: CPT

## 2017-03-04 PROCEDURE — 85379 FIBRIN DEGRADATION QUANT: CPT | Performed by: EMERGENCY MEDICINE

## 2017-03-04 PROCEDURE — 94640 AIRWAY INHALATION TREATMENT: CPT

## 2017-03-04 PROCEDURE — 74011250637 HC RX REV CODE- 250/637: Performed by: INTERNAL MEDICINE

## 2017-03-04 PROCEDURE — 65660000000 HC RM CCU STEPDOWN

## 2017-03-04 PROCEDURE — 85306 CLOT INHIBIT PROT S FREE: CPT | Performed by: INTERNAL MEDICINE

## 2017-03-04 PROCEDURE — 81001 URINALYSIS AUTO W/SCOPE: CPT | Performed by: EMERGENCY MEDICINE

## 2017-03-04 PROCEDURE — 80053 COMPREHEN METABOLIC PANEL: CPT | Performed by: EMERGENCY MEDICINE

## 2017-03-04 PROCEDURE — 71010 XR CHEST PORT: CPT

## 2017-03-04 PROCEDURE — 85384 FIBRINOGEN ACTIVITY: CPT | Performed by: INTERNAL MEDICINE

## 2017-03-04 PROCEDURE — 85300 ANTITHROMBIN III ACTIVITY: CPT | Performed by: INTERNAL MEDICINE

## 2017-03-04 PROCEDURE — 93005 ELECTROCARDIOGRAM TRACING: CPT

## 2017-03-04 PROCEDURE — 74011250637 HC RX REV CODE- 250/637: Performed by: EMERGENCY MEDICINE

## 2017-03-04 PROCEDURE — 85732 THROMBOPLASTIN TIME PARTIAL: CPT | Performed by: INTERNAL MEDICINE

## 2017-03-04 PROCEDURE — 84484 ASSAY OF TROPONIN QUANT: CPT | Performed by: EMERGENCY MEDICINE

## 2017-03-04 PROCEDURE — 85598 HEXAGNAL PHOSPH PLTLT NEUTRL: CPT | Performed by: INTERNAL MEDICINE

## 2017-03-04 PROCEDURE — 74011000250 HC RX REV CODE- 250: Performed by: EMERGENCY MEDICINE

## 2017-03-04 PROCEDURE — 85303 CLOT INHIBIT PROT C ACTIVITY: CPT | Performed by: INTERNAL MEDICINE

## 2017-03-04 PROCEDURE — 74011636637 HC RX REV CODE- 636/637: Performed by: EMERGENCY MEDICINE

## 2017-03-04 PROCEDURE — 74011636320 HC RX REV CODE- 636/320: Performed by: EMERGENCY MEDICINE

## 2017-03-04 PROCEDURE — 99285 EMERGENCY DEPT VISIT HI MDM: CPT

## 2017-03-04 PROCEDURE — 81241 F5 GENE: CPT | Performed by: INTERNAL MEDICINE

## 2017-03-04 PROCEDURE — 82550 ASSAY OF CK (CPK): CPT | Performed by: EMERGENCY MEDICINE

## 2017-03-04 RX ORDER — PANTOPRAZOLE SODIUM 40 MG/1
40 TABLET, DELAYED RELEASE ORAL DAILY
Status: DISCONTINUED | OUTPATIENT
Start: 2017-03-04 | End: 2017-03-08 | Stop reason: HOSPADM

## 2017-03-04 RX ORDER — ENOXAPARIN SODIUM 100 MG/ML
1 INJECTION SUBCUTANEOUS
Status: COMPLETED | OUTPATIENT
Start: 2017-03-04 | End: 2017-03-04

## 2017-03-04 RX ORDER — ENOXAPARIN SODIUM 100 MG/ML
1 INJECTION SUBCUTANEOUS EVERY 12 HOURS
Status: DISCONTINUED | OUTPATIENT
Start: 2017-03-05 | End: 2017-03-08 | Stop reason: HOSPADM

## 2017-03-04 RX ORDER — GUAIFENESIN 100 MG/5ML
324 LIQUID (ML) ORAL
Status: COMPLETED | OUTPATIENT
Start: 2017-03-04 | End: 2017-03-04

## 2017-03-04 RX ORDER — ALPRAZOLAM 0.5 MG/1
0.5 TABLET ORAL
Status: DISCONTINUED | OUTPATIENT
Start: 2017-03-04 | End: 2017-03-08 | Stop reason: HOSPADM

## 2017-03-04 RX ORDER — SODIUM CHLORIDE 0.9 % (FLUSH) 0.9 %
10 SYRINGE (ML) INJECTION
Status: COMPLETED | OUTPATIENT
Start: 2017-03-04 | End: 2017-03-04

## 2017-03-04 RX ORDER — IPRATROPIUM BROMIDE AND ALBUTEROL SULFATE 2.5; .5 MG/3ML; MG/3ML
3 SOLUTION RESPIRATORY (INHALATION)
Status: COMPLETED | OUTPATIENT
Start: 2017-03-04 | End: 2017-03-04

## 2017-03-04 RX ORDER — NORTRIPTYLINE HYDROCHLORIDE 25 MG/1
25 CAPSULE ORAL
Status: DISCONTINUED | OUTPATIENT
Start: 2017-03-04 | End: 2017-03-08 | Stop reason: HOSPADM

## 2017-03-04 RX ORDER — SUCRALFATE 1 G/10ML
0.5 SUSPENSION ORAL 4 TIMES DAILY
Status: DISCONTINUED | OUTPATIENT
Start: 2017-03-04 | End: 2017-03-08 | Stop reason: HOSPADM

## 2017-03-04 RX ORDER — IPRATROPIUM BROMIDE AND ALBUTEROL SULFATE 2.5; .5 MG/3ML; MG/3ML
3 SOLUTION RESPIRATORY (INHALATION)
Status: DISCONTINUED | OUTPATIENT
Start: 2017-03-04 | End: 2017-03-08 | Stop reason: HOSPADM

## 2017-03-04 RX ORDER — DILTIAZEM HYDROCHLORIDE 120 MG/1
120 CAPSULE, COATED, EXTENDED RELEASE ORAL DAILY
Status: DISCONTINUED | OUTPATIENT
Start: 2017-03-05 | End: 2017-03-08 | Stop reason: HOSPADM

## 2017-03-04 RX ORDER — SODIUM CHLORIDE 9 MG/ML
50 INJECTION, SOLUTION INTRAVENOUS
Status: COMPLETED | OUTPATIENT
Start: 2017-03-04 | End: 2017-03-04

## 2017-03-04 RX ORDER — PREDNISONE 20 MG/1
60 TABLET ORAL
Status: COMPLETED | OUTPATIENT
Start: 2017-03-04 | End: 2017-03-04

## 2017-03-04 RX ORDER — MORPHINE SULFATE 2 MG/ML
2 INJECTION, SOLUTION INTRAMUSCULAR; INTRAVENOUS
Status: DISCONTINUED | OUTPATIENT
Start: 2017-03-04 | End: 2017-03-08 | Stop reason: HOSPADM

## 2017-03-04 RX ORDER — ONDANSETRON 2 MG/ML
4 INJECTION INTRAMUSCULAR; INTRAVENOUS
Status: DISCONTINUED | OUTPATIENT
Start: 2017-03-04 | End: 2017-03-08 | Stop reason: HOSPADM

## 2017-03-04 RX ORDER — ZOLPIDEM TARTRATE 5 MG/1
5 TABLET ORAL
Status: DISCONTINUED | OUTPATIENT
Start: 2017-03-04 | End: 2017-03-08 | Stop reason: HOSPADM

## 2017-03-04 RX ORDER — ACETAMINOPHEN 325 MG/1
650 TABLET ORAL
Status: DISCONTINUED | OUTPATIENT
Start: 2017-03-04 | End: 2017-03-08 | Stop reason: HOSPADM

## 2017-03-04 RX ORDER — LABETALOL HYDROCHLORIDE 5 MG/ML
10 INJECTION, SOLUTION INTRAVENOUS
Status: DISCONTINUED | OUTPATIENT
Start: 2017-03-04 | End: 2017-03-08 | Stop reason: HOSPADM

## 2017-03-04 RX ORDER — VENLAFAXINE HYDROCHLORIDE 150 MG/1
150 CAPSULE, EXTENDED RELEASE ORAL DAILY
Status: DISCONTINUED | OUTPATIENT
Start: 2017-03-04 | End: 2017-03-08 | Stop reason: HOSPADM

## 2017-03-04 RX ORDER — CYCLOBENZAPRINE HCL 10 MG
10 TABLET ORAL
Status: DISCONTINUED | OUTPATIENT
Start: 2017-03-04 | End: 2017-03-08 | Stop reason: HOSPADM

## 2017-03-04 RX ORDER — FLUTICASONE FUROATE AND VILANTEROL 200; 25 UG/1; UG/1
1 POWDER RESPIRATORY (INHALATION) DAILY
Status: DISCONTINUED | OUTPATIENT
Start: 2017-03-05 | End: 2017-03-08 | Stop reason: HOSPADM

## 2017-03-04 RX ORDER — OXYCODONE AND ACETAMINOPHEN 5; 325 MG/1; MG/1
1 TABLET ORAL
Status: DISCONTINUED | OUTPATIENT
Start: 2017-03-04 | End: 2017-03-08 | Stop reason: HOSPADM

## 2017-03-04 RX ADMIN — NITROGLYCERIN 0.5 INCH: 20 OINTMENT TOPICAL at 16:48

## 2017-03-04 RX ADMIN — ACETAMINOPHEN 650 MG: 325 TABLET, FILM COATED ORAL at 21:41

## 2017-03-04 RX ADMIN — IOPAMIDOL 100 ML: 755 INJECTION, SOLUTION INTRAVENOUS at 17:59

## 2017-03-04 RX ADMIN — ENOXAPARIN SODIUM 120 MG: 40 INJECTION SUBCUTANEOUS at 19:31

## 2017-03-04 RX ADMIN — VENLAFAXINE HYDROCHLORIDE 150 MG: 150 CAPSULE, EXTENDED RELEASE ORAL at 23:14

## 2017-03-04 RX ADMIN — PREDNISONE 60 MG: 20 TABLET ORAL at 15:32

## 2017-03-04 RX ADMIN — PANTOPRAZOLE SODIUM 40 MG: 40 TABLET, DELAYED RELEASE ORAL at 23:14

## 2017-03-04 RX ADMIN — ASPIRIN 81 MG CHEWABLE TABLET 324 MG: 81 TABLET CHEWABLE at 16:48

## 2017-03-04 RX ADMIN — IPRATROPIUM BROMIDE AND ALBUTEROL SULFATE 3 ML: .5; 3 SOLUTION RESPIRATORY (INHALATION) at 15:17

## 2017-03-04 RX ADMIN — SODIUM CHLORIDE 50 ML/HR: 900 INJECTION, SOLUTION INTRAVENOUS at 17:59

## 2017-03-04 RX ADMIN — NORTRIPTYLINE HYDROCHLORIDE 25 MG: 25 CAPSULE ORAL at 23:14

## 2017-03-04 RX ADMIN — OXYCODONE HYDROCHLORIDE AND ACETAMINOPHEN 1 TABLET: 5; 325 TABLET ORAL at 23:14

## 2017-03-04 RX ADMIN — SUCRALFATE 0.5 G: 1 SUSPENSION ORAL at 21:42

## 2017-03-04 RX ADMIN — Medication 10 ML: at 17:59

## 2017-03-04 RX ADMIN — IPRATROPIUM BROMIDE AND ALBUTEROL SULFATE 3 ML: .5; 3 SOLUTION RESPIRATORY (INHALATION) at 15:40

## 2017-03-04 NOTE — ED NOTES
Assumed care of patient. Patient placed in position of comfort. Call bell in reach. Skin warm and dry. Respirations even and unlabored. In no apparent distress at this time. Pt presents ambulatory into the ED with c/o SOB and congestion x yesterday afternoon. A&O x 4. Placed on cardiac monitor x 3.

## 2017-03-04 NOTE — ED PROVIDER NOTES
HPI Comments: Ana Burch is a 61 y.o. female with PMHx of anemia, asthma, COPD, and arrhythmia presenting ambulatory to ED [facility] c/o SOB since yesterday afternoon. She reports additional symptom of wheezing with her SOB. Pt notes that as she was getting out of the car yesterday, walking into her house she started developing SOB. Her SOB continued through the night, and into the morning. Pt notes that her SOB is exacerbated with exertion. She reports that she has a hx of asthma and COPD, but she has never had SOB this severe. Pt notes that she has been using her rescue inhaler with no relief. She reports that she has a pulse oximeter at home, and at home her O2 Sats were in the 60%s and she had blue colored lips. Pt used a nebulizer treatment ~1 and a half hours ago which brought her O2 Sats to 90%, but then her Sats started to decline again. In route to ED, she used 2 L O2 which provided relief, but pt notes that normally she is not on O2. Earlier yesterday, pt went to physical therapy, and was fine throughout her appointment. She reports that she had a stress test a couple months ago, and a bronchoscopy recently. Pt denies fever, chills, chest pain, hx of DVT, hx of PE, or recent steroid use. PCP: Mari Gibbons NP    There are no other complaints, changes, or physical findings at this time. The history is provided by the patient. No  was used.         Past Medical History:   Diagnosis Date    Anemia 3/15/2013    Arrhythmia     paroxsimal afib    Asthma 3/15/2013    Asthma     Back disorder     disc problem    Chronic obstructive pulmonary disease (HCC)     Morbid obesity ([facility] [address] 3/15/2013       Past Surgical History:   Procedure Laterality Date    HX ADENOIDECTOMY      HX APPENDECTOMY      HX GASTRIC BYPASS  1-15-98    Dr. Des Gaston HX ORTHOPAEDIC      arm fracture    HX TONSILLECTOMY      HX TUBAL LIGATION  79    HX TUBAL LIGATION           Family History:   Problem Relation Age of Onset   [address] [facility] Cancer Sister      lung    Other Mother      fem pop bypass    Bleeding Prob Mother     Heart Disease Mother     Cancer Father      pancreatic    Cancer Brother        Social History     Social History    Marital status:      Spouse name: N/A    Number of children: N/A    Years of education: N/A     Occupational History    Not on file. Social History Main Topics    Smoking status: Never Smoker    Smokeless tobacco: Never Used    Alcohol use Yes    Drug use: No    Sexual activity: No     Other Topics Concern    Not on file     Social History Narrative    ** Merged History Encounter **              ALLERGIES: Pcn [penicillins]; Griseofulvin; Pcn [penicillins]; and Tramadol    Review of Systems   Constitutional: Negative for appetite change, chills and fever. HENT: Negative for congestion. Eyes: Negative for visual disturbance. Respiratory: Positive for shortness of breath and wheezing. Negative for cough. Cardiovascular: Negative for chest pain, palpitations and leg swelling. Gastrointestinal: Negative for abdominal pain. Genitourinary: Negative for dysuria, frequency and urgency. Musculoskeletal: Negative for back pain, joint swelling, myalgias and neck stiffness. Skin: Negative for rash. Neurological: Negative for dizziness, syncope, weakness and headaches. Hematological: Negative for adenopathy. Psychiatric/Behavioral: Negative for behavioral problems and dysphoric mood. All other systems reviewed and are negative.       Patient Vitals for the past 12 hrs:   Temp Pulse Resp BP SpO2   03/04/17 2128 98.1 °F (36.7 °C) (!) 110 22 131/89 95 %   03/04/17 2000 - (!) 119 22 109/64 92 %   03/04/17 1914 - (!) 122 16 - 94 %   03/04/17 1738 - (!) 125 20 102/63 -   03/04/17 1700 - (!) 127 21 152/66 94 %   03/04/17 1624 - (!) 110 - 150/79 98 %   03/04/17 1600 - (!) 109 14 135/71 97 %   03/04/17 1545 - 92 10 126/82 98 %   03/04/17 1448 97.4 °F (36.3 °C) 94 22 (!) 148/98 96 %            Physical Exam   Constitutional: She is oriented to person, place, and time. She appears well-nourished. No distress. Morbidly obese   HENT:   Head: Normocephalic and atraumatic. Mouth/Throat: Oropharynx is clear and moist.   Eyes: Conjunctivae and EOM are normal. Pupils are equal, round, and reactive to light. No scleral icterus. Neck: Normal range of motion. Neck supple. Cardiovascular: Regular rhythm. Tachycardia present. Exam reveals no gallop. No murmur heard. Pulmonary/Chest: Effort normal. No stridor. No respiratory distress. She has wheezes. She has no rales. Bilateral basilar expiratory wheezing   Abdominal: Soft. Bowel sounds are normal. She exhibits no distension and no mass. There is no tenderness. There is no rebound and no guarding. Musculoskeletal: Normal range of motion. She exhibits no edema. Lymphadenopathy:     She has no cervical adenopathy. Neurological: She is alert and oriented to person, place, and time. No cranial nerve deficit. Coordination normal.   Skin: Skin is warm and dry. No rash noted. No erythema. Psychiatric: Her mood appears anxious. Nursing note and vitals reviewed.        MDM  Number of Diagnoses or Management Options  Elevated troponin:   Other acute pulmonary embolism with acute cor pulmonale [facility]):   Diagnosis management comments: DDx: asthma exacerbation, COPD exacerbation, CHF, pneumonia        Amount and/or Complexity of Data Reviewed  Clinical lab tests: ordered and reviewed  Tests in the radiology section of CPT®: ordered and reviewed  Tests in the medicine section of CPT®: ordered and reviewed  Review and summarize past medical records: yes  Discuss the patient with other providers: yes (Hospitalist)  Independent visualization of images, tracings, or specimens: yes    Critical Care  Total time providing critical care: 30-74 minutes    Patient Progress  Patient progress: stable    ED Course       Procedures     EKG interpretation: (Preliminary)  Rhythm: sinus tachycardia; and regular . Rate (approx.): 101; Axis: normal; P wave: normal; QRS interval: normal ; ST/T wave: non-specific changes; Other findings: old inferior infarct. 4:47 PM  Pt remains SOB, though O2 saturation is normal. TnI is elevated. Will check d-dimer, recheck TnI at 3 hours, cover with ASA and NTG. Ernesto Warner MD    5:30 PM  D-dimer elevated; will send for CTA chest  Xena Bassett MD    6:48 PM  CTA confirms extensive right side PE with right heart strain. Will anticoagulate and admit. Xena Bassett MD      CRITICAL CARE NOTE:    7:02 PM      IMPENDING DETERIORATION -Respiratory and Cardiovascular    ASSOCIATED RISK FACTORS - Hypotension, Hypoxia, Dysrhythmia and Vascular Compromise    MANAGEMENT- Bedside Assessment    INTERPRETATION -  Xrays, CT Scan, ECG, Blood Pressure and Cardiac Output Measures     INTERVENTIONS - vascular control    CASE REVIEW - Hospitalist, Nursing and Family    TREATMENT RESPONSE -Stable    PERFORMED BY - Self      NOTES   :      I have spent 45 minutes of critical care time involved in lab review, consultations with specialist, family decision- making, bedside attention and documentation. During this entire length of time I was immediately available to the patient . Ernesto Warner MD    CONSULT NOTE:   7:03 PM  Ernesto Warner MD spoke with Dr. Harsh Stoddard,   Specialty: Hospitalist  Discussed pt's hx, disposition, and available diagnostic and imaging results. Reviewed care plans. Consultant will evaluate pt for admission.   Written by Alexandr Garza, ED Scribe, as dictated by Ernesto Warner MD.    LABORATORY TESTS:  Recent Results (from the past 12 hour(s))   EKG, 12 LEAD, INITIAL    Collection Time: 03/04/17  2:58 PM   Result Value Ref Range    Ventricular Rate 101 BPM    Atrial Rate 101 BPM    P-R Interval 112 ms    QRS Duration 84 ms    Q-T Interval 338 ms QTC Calculation (Bezet) 438 ms    Calculated P Axis 48 degrees    Calculated R Axis -2 degrees    Calculated T Axis 39 degrees    Diagnosis       Sinus tachycardia  Possible Inferior infarct , age undetermined  When compared with ECG of 17-FEB-2004 05:41,  Previous ECG has undetermined rhythm, needs review     URINALYSIS W/ REFLEX CULTURE    Collection Time: 03/04/17  3:46 PM   Result Value Ref Range    Color YELLOW/STRAW      Appearance CLEAR CLEAR      Specific gravity <1.005 1.003 - 1.030    pH (UA) 6.5 5.0 - 8.0      Protein NEGATIVE  NEG mg/dL    Glucose NEGATIVE  NEG mg/dL    Ketone NEGATIVE  NEG mg/dL    Bilirubin NEGATIVE  NEG      Blood NEGATIVE  NEG      Urobilinogen 0.2 0.2 - 1.0 EU/dL    Nitrites NEGATIVE  NEG      Leukocyte Esterase NEGATIVE  NEG      WBC 0-4 0 - 4 /hpf    RBC 0-5 0 - 5 /hpf    Epithelial cells FEW FEW /lpf    Bacteria NEGATIVE  NEG /hpf    UA:UC IF INDICATED CULTURE NOT INDICATED BY UA RESULT CNI     CBC WITH AUTOMATED DIFF    Collection Time: 03/04/17  3:46 PM   Result Value Ref Range    WBC 8.3 3.6 - 11.0 K/uL    RBC 4.94 3.80 - 5.20 M/uL    HGB 14.0 11.5 - 16.0 g/dL    HCT 44.3 35.0 - 47.0 %    MCV 89.7 80.0 - 99.0 FL    MCH 28.3 26.0 - 34.0 PG    MCHC 31.6 30.0 - 36.5 g/dL    RDW 14.2 11.5 - 14.5 %    PLATELET 709 183 - 306 K/uL    NEUTROPHILS 54 32 - 75 %    LYMPHOCYTES 33 12 - 49 %    MONOCYTES 7 5 - 13 %    EOSINOPHILS 5 0 - 7 %    BASOPHILS 1 0 - 1 %    ABS. NEUTROPHILS 4.5 1.8 - 8.0 K/UL    ABS. LYMPHOCYTES 2.7 0.8 - 3.5 K/UL    ABS. MONOCYTES 0.6 0.0 - 1.0 K/UL    ABS. EOSINOPHILS 0.4 0.0 - 0.4 K/UL    ABS.  BASOPHILS 0.1 0.0 - 0.1 K/UL   METABOLIC PANEL, COMPREHENSIVE    Collection Time: 03/04/17  3:46 PM   Result Value Ref Range    Sodium 143 136 - 145 mmol/L    Potassium 4.2 3.5 - 5.1 mmol/L    Chloride 108 97 - 108 mmol/L    CO2 27 21 - 32 mmol/L    Anion gap 8 5 - 15 mmol/L    Glucose 98 65 - 100 mg/dL    BUN 9 6 - 20 MG/DL    Creatinine 0.96 0.55 - 1.02 MG/DL BUN/Creatinine ratio 9 (L) 12 - 20      GFR est AA >60 >60 ml/min/1.73m2    GFR est non-AA 60 (L) >60 ml/min/1.73m2    Calcium 8.9 8.5 - 10.1 MG/DL    Bilirubin, total 0.4 0.2 - 1.0 MG/DL    ALT (SGPT) 16 12 - 78 U/L    AST (SGOT) 17 15 - 37 U/L    Alk. phosphatase 84 45 - 117 U/L    Protein, total 6.1 (L) 6.4 - 8.2 g/dL    Albumin 3.2 (L) 3.5 - 5.0 g/dL    Globulin 2.9 2.0 - 4.0 g/dL    A-G Ratio 1.1 1.1 - 2.2     TROPONIN I    Collection Time: 03/04/17  3:46 PM   Result Value Ref Range    Troponin-I, Qt. 0.32 (H) <0.05 ng/mL   CK W/ CKMB & INDEX    Collection Time: 03/04/17  3:46 PM   Result Value Ref Range    CK 55 26 - 192 U/L    CK - MB 3.0 <3.6 NG/ML    CK-MB Index 5.5 (H) 0 - 2.5     D DIMER    Collection Time: 03/04/17  3:47 PM   Result Value Ref Range    D-dimer 4.77 (H) 0.00 - 0.65 mg/L FEU   CK W/ CKMB & INDEX    Collection Time: 03/04/17  6:10 PM   Result Value Ref Range    CK 49 26 - 192 U/L    CK - MB 3.0 <3.6 NG/ML    CK-MB Index 6.1 (H) 0 - 2.5     TROPONIN I    Collection Time: 03/04/17  6:10 PM   Result Value Ref Range    Troponin-I, Qt. 0.34 (H) <0.05 ng/mL   FIBRINOGEN    Collection Time: 03/04/17  8:15 PM   Result Value Ref Range    Fibrinogen 353 200 - 475 mg/dL   TROPONIN I    Collection Time: 03/04/17  8:15 PM   Result Value Ref Range    Troponin-I, Qt. 0.34 (H) <0.05 ng/mL       IMAGING RESULTS:    PORTABLE CHEST RADIOGRAPH/S: 3/4/2017 4:44 PM     INDICATION: Dyspnea. HISTORY (per electronic medical record): Dyspnea and congestion yesterday  afternoon.     COMPARISON: None.     TECHNIQUE: Portable frontal upright radiograph/s of the chest.     FINDINGS:   The lungs are clear and the central airways are patent. No pneumothorax or large  pleural effusion. Bilateral acromioclavicular osteoarthritis.      IMPRESSION  IMPRESSION:   Clear lungs.       CT ANGIOGRAPHY CHEST. 3/4/2017 5:56 PM      INDICATION: Dyspnea, elevated d-dimer.    HISTORY (per electronic medical record): Dyspnea and congestion for a few days.     COMPARISON: 11/4/2016, same day chest radiograph.     TECHNIQUE: CT angiography of the chest was performed after the administration of  100 cc IV contrast (Isovue 370). Coronal and sagittal, and coronal MIP  reconstructions were performed. CT dose reduction was achieved through use of a  standardized protocol tailored for this examination and automatic exposure  control for dose modulation.     FINDINGS:  Thrombus in the right main pulmonary artery extends into the interlobar, right  lower lobe, and right upper lobe pulmonary arteries. There are smaller segmental  and subsegmental PEs in the right lung. A smaller PE is present in the superior  lingular segment of the left upper lobe (601-55, 602-110). There are probably  subsegmental PEs in the left lower lobe, though contrast bolus timing is  suboptimal. Right heart dilation and bowing of the septum toward the left  ventricle (2-61) is consistent with right heart strain.     Aside from minimal left basilar atelectasis, the lungs are clear. The central  airways are patent. No pneumothorax or pleural effusion. No thoracic  lymphadenopathy. Post Pricila-en-Y gastric bypass and cholecystectomy. The gastric  pouch is herniated into the chest.     IMPRESSION  IMPRESSION:   1. Right pulmonary artery PE, with extension into the interlobar, right lower  lobe, and right upper lobe pulmonary arteries. Smaller segmental and  subsegmental pulmonary emboli in the bilateral lungs. 2. Right heart strain.     The findings were called to Dr. Mary Guajardo on 3/4/2017 6:55 PM by Dr. Afsaneh Brooks.  [id number]          MEDICATIONS GIVEN:  Medications   ALPRAZolam (XANAX) tablet 0.5 mg (not administered)   cyclobenzaprine (FLEXERIL) tablet 10 mg (not administered)   dilTIAZem CD (CARDIZEM CD) capsule 120 mg (not administered)   fluticasone-vilanterol (BREO ELLIPTA) 200mcg-25mcg/puff (not administered)   sucralfate (CARAFATE) 100 mg/mL oral suspension 0.5 g (0.5 g Oral Given 3/4/17 2142)   venlafaxine-SR (EFFEXOR-XR) capsule 150 mg (150 mg Oral Given 3/4/17 2314)   zolpidem (AMBIEN) tablet 5 mg (not administered)   enoxaparin (LOVENOX) injection 120 mg (not administered)   albuterol-ipratropium (DUO-NEB) 2.5 MG-0.5 MG/3 ML (not administered)   labetalol (NORMODYNE;TRANDATE) 20 mg/4 mL (5 mg/mL) injection 10 mg (not administered)   acetaminophen (TYLENOL) tablet 650 mg (650 mg Oral Given 3/4/17 2141)   oxyCODONE-acetaminophen (PERCOCET) 5-325 mg per tablet 1 Tab (1 Tab Oral Given 3/4/17 2314)   morphine injection 2 mg (not administered)   ondansetron (ZOFRAN) injection 4 mg (not administered)   pantoprazole (PROTONIX) tablet 40 mg (40 mg Oral Given 3/4/17 2314)   nortriptyline (PAMELOR) capsule 25 mg (25 mg Oral Given 3/4/17 2314)   predniSONE (DELTASONE) tablet 60 mg (60 mg Oral Given 3/4/17 1532)   albuterol-ipratropium (DUO-NEB) 2.5 MG-0.5 MG/3 ML (3 mL Nebulization Given 3/4/17 1540)   aspirin chewable tablet 324 mg (324 mg Oral Given 3/4/17 1648)   nitroglycerin (NITROBID) 2 % ointment 0.5 Inch (0.5 Inches Topical Given 3/4/17 1648)   sodium chloride (NS) flush 10 mL (10 mL IntraVENous Given 3/4/17 1759)   iopamidol (ISOVUE-370) 76 % injection 100 mL (100 mL IntraVENous Given 3/4/17 1759)   0.9% sodium chloride infusion (50 mL/hr IntraVENous New Bag 3/4/17 1759)   enoxaparin (LOVENOX) injection 120 mg (120 mg SubCUTAneous Given 3/4/17 1931)       IMPRESSION:  1. Other acute pulmonary embolism with acute cor pulmonale (HCC)    2. Elevated troponin        PLAN:  1. Admit to hospitalist    ADMIT NOTE:  7:03 PM  Patient is being admitted to the hospital by Dr. Baylee Self. The results of their tests and reasons for their admission have been discussed with the patient and/or available family. They convey agreement and understanding for the need to be admitted and for their admission diagnosis.         This note is prepared by Kendall Dooley, acting as Scribe for Bird Stafford MD Shona Armas MD: The scribe's documentation has been prepared under my direction and personally reviewed by me in its entirety. I confirm that the note above accurately reflects all work, treatment, procedures, and medical decision making performed by me.

## 2017-03-04 NOTE — IP AVS SNAPSHOT
Current Discharge Medication List  
  
Take these medications at their scheduled times Dose & Instructions Dispensing Information Comments Morning Noon Evening Bedtime  
 albuterol-ipratropium 2.5 mg-0.5 mg/3 ml Nebu Commonly known as:  Juan Carlos Quiñones Your next dose is: Today, Tomorrow Other:  ____________ Dose:  3 mL  
3 mL by Nebulization route every four (4) hours. Indications: CHRONIC OBSTRUCTIVE PULMONARY DISEASE WITH BRONCHOSPASMS Quantity:  30 Vial  
Refills:  11 Dx [id number]  
    
   
   
   
  
 enoxaparin 120 mg/0.8 mL injection Commonly known as:  LOVENOX Your next dose is: Today, Tomorrow Other:  ____________ Dose:  120 mg  
120 mg by SubCUTAneous route every twelve (12) hours for 7 days. Quantity:  14 Syringe Refills:  0  
     
   
   
   
  
 ergocalciferol 50,000 unit capsule Commonly known as:  ERGOCALCIFEROL Your next dose is: Today, Tomorrow Other:  ____________ Dose:  80953 Units Take 1 Cap by mouth every seven (7) days. Quantity:  14 Cap Refills:  1  
     
   
   
   
  
 esomeprazole 40 mg capsule Commonly known as:  Jordis Jannet Your next dose is: Today, Tomorrow Other:  ____________ Dose:  40 mg Take 1 Cap by mouth daily. Quantity:  30 Cap Refills:  2  
     
   
   
   
  
 hydroCHLOROthiazide 12.5 mg tablet Commonly known as:  HYDRODIURIL Your next dose is: Today, Tomorrow Other:  ____________ Dose:  12.5 mg Take 1 Tab by mouth daily. Quantity:  30 Tab Refills:  2  
     
   
   
   
  
 mometasone-formoterol 200-5 mcg/actuation HFA inhaler Commonly known as:  Delano Ros Your next dose is: Today, Tomorrow Other:  ____________ Dose:  2 Puff Take 2 Puffs by inhalation two (2) times a day. Quantity:  3 Inhaler Refills:  3 Peak Flow Meter Sunny Avitia Your next dose is: Today, Tomorrow Other:  ____________ Dose:  1 Device 1 Device by Does Not Apply route daily. Quantity:  1 Device Refills:  0  
     
   
   
   
  
 sucralfate 100 mg/mL suspension Commonly known as:  Tree Cole Your next dose is: Today, Tomorrow Other:  ____________ Dose:  1 tsp Take 5 mL by mouth four (4) times daily. Quantity:  600 mL Refills:  5  
     
   
   
   
  
 venlafaxine- mg capsule Commonly known as:  EFFEXOR-XR Your next dose is: Today, Tomorrow Other:  ____________ Dose:  150 mg Take 1 Cap by mouth daily. Indications: nerves Quantity:  90 Cap Refills:  1 Take these medications as needed Dose & Instructions Dispensing Information Comments Morning Noon Evening Bedtime * albuterol 90 mcg/actuation inhaler Commonly known as:  PROAIR HFA Your next dose is: Today, Tomorrow Other:  ____________ Dose:  2 Puff Take 2 Puffs by inhalation every four (4) hours as needed for Wheezing. Quantity:  3 Inhaler Refills:  3  
     
   
   
   
  
 * albuterol 90 mcg/actuation inhaler Commonly known as:  PROAIR HFA Your next dose is: Today, Tomorrow Other:  ____________ Dose:  2 Puff Take 2 Puffs by inhalation every four (4) hours as needed for Wheezing. Quantity:  3 Inhaler Refills:  3 ALPRAZolam 0.5 mg tablet Commonly known as:  Eddie [place] Your next dose is: Today, Tomorrow Other:  ____________ Dose:  0.5 mg Take 1 Tab by mouth nightly as needed for Anxiety. Max Daily Amount: 0.5 mg.  
 Quantity:  30 Tab Refills:  2  
     
   
   
   
  
 cyclobenzaprine 10 mg tablet Commonly known as:  FLEXERIL Your next dose is: Today, Tomorrow Other:  ____________  Dose:  10 mg  
 Take 1 Tab by mouth three (3) times daily as needed for Muscle Spasm(s). Quantity:  90 Tab Refills:  2  
     
   
   
   
  
 inhalational spacing device Your next dose is: Today, Tomorrow Other:  ____________ Dose:  1 Each  
1 Each by Does Not Apply route as needed. Quantity:  1 Device Refills:  0 NORCO 7.5-325 mg per tablet Generic drug:  HYDROcodone-acetaminophen Your next dose is: Today, Tomorrow Other:  ____________ Dose:  1 Tab Take 1 Tab by mouth every six (6) hours as needed for Pain. Refills:  0  
     
   
   
   
  
 zolpidem 5 mg tablet Commonly known as:  AMBIEN Your next dose is: Today, Tomorrow Other:  ____________ Dose:  5 mg Take 1 Tab by mouth nightly as needed for Sleep. Max Daily Amount: 5 mg. Quantity:  30 Tab Refills:  2  
     
   
   
   
  
 * Notice: This list has 2 medication(s) that are the same as other medications prescribed for you. Read the directions carefully, and ask your doctor or other care provider to review them with you. Take these medications as directed Dose & Instructions Dispensing Information Comments Morning Noon Evening Bedtime  
 dilTIAZem  mg ER capsule Commonly known as:  CARDIZEM CD Your next dose is: Today, Tomorrow Other:  ____________  
   
   
 take 1 capsule by mouth once daily Refills:  0  
     
   
   
   
  
 nortriptyline 25 mg capsule Commonly known as:  PAMELOR Your next dose is: Today, Tomorrow Other:  ____________  
   
   
 take 1 to 2 tablets by mouth at bedtime if needed Refills:  0  
     
   
   
   
  
 warfarin 5 mg tablet Commonly known as:  COUMADIN Your next dose is: Today, Tomorrow Other:  ____________ Take 7.5mg daily or as instructed Quantity:  60 Tab Refills:  0 Where to Get Your Medications Information about where to get these medications is not yet available ! Ask your nurse or doctor about these medications  
  enoxaparin 120 mg/0.8 mL injection  
 warfarin 5 mg tablet

## 2017-03-04 NOTE — IP AVS SNAPSHOT
[address] [facility] 
[phone] Patient: Amanda Holly MRN: [id number] [id number] You are allergic to the following Allergen Reactions Pcn (Penicillins) Rash Griseofulvin Other (comments) Aaron-dirk syndrome Pcn (Penicillins) Rash Swelling Tramadol Nausea Only Drowsiness Recent Documentation Height Weight BMI OB Status Smoking Status 1.676 m 117.3 kg 41.74 kg/m2 Menopause Never Smoker Unresulted Labs Order Current Status FACTOR V LEIDEN In process Emergency Contacts Name Discharge Info Relation Home Work [facility] CAREGIVER [3] Child [2] [phone] About your hospitalization You were admitted on:  March 4, 2017 You last received care in the:  [facility] 2 CARDIOPULMONARY CARE You were discharged on:  March 8, 2017 Unit phone number:  [phone] Why you were hospitalized Your primary diagnosis was:  Not on File Your diagnoses also included:  Acute Pulmonary Embolism (Hcc), Morbid Obesity (Hcc), Chronic Obstructive Pulmonary Disease (Hcc), Troponin I Above Reference Range Providers Seen During Your Hospitalizations Provider Role Specialty Primary office phone James Travis MD Attending Provider Emergency Medicine [phone] Vu Emanuel MD Attending Provider Internal Medicine [phone] Your Primary Care Physician (PCP) Primary Care Physician Office Phone Office Fax MARIA FERNANDA ALVARADO ** None ** ** None ** Follow-up Information Follow up With Details Comments Contact Info Naomi Medina NP Go on 3/14/2017 APPOINTMENT TIME: 2:00 PM [address] [facility] [phone] Your Appointments  Monday March 13, 2017  1:00 PM EDT  
PT GENERAL F/U with Megan Newberry PT  
 [facility] OP REHAB [facility]) [facility] [address] [address] [phone] Tuesday March 14, 2017  2:00 PM EDT  
ESTABLISHED PATIENT with EDILSON Salazar ([facility]) [address] [facility] [phone] Friday March 17, 2017  1:00 PM EDT  
PT GENERAL F/U with Erroll Counts, PT  
[facility] OP [facility]) [facility] [address] [address] [phone] Current Discharge Medication List  
  
START taking these medications Dose & Instructions Dispensing Information Comments Morning Noon Evening Bedtime  
 enoxaparin 120 mg/0.8 mL injection Commonly known as:  LOVENOX Your next dose is: Today, Tomorrow Other:  _________ Dose:  120 mg  
120 mg by SubCUTAneous route every twelve (12) hours for 7 days. Quantity:  14 Syringe Refills:  0  
     
   
   
   
  
 warfarin 5 mg tablet Commonly known as:  COUMADIN Your next dose is: Today, Tomorrow Other:  _________ Take 7.5mg daily or as instructed Quantity:  60 Tab Refills:  0 CONTINUE these medications which have CHANGED Dose & Instructions Dispensing Information Comments Morning Noon Evening Bedtime  
 venlafaxine- mg capsule Commonly known as:  EFFEXOR-XR What changed:  Another medication with the same name was removed. Continue taking this medication, and follow the directions you see here. Your next dose is: Today, Tomorrow Other:  _________ Dose:  150 mg Take 1 Cap by mouth daily. Indications: nerves Quantity:  90 Cap Refills:  1 CONTINUE these medications which have NOT CHANGED Dose & Instructions Dispensing Information Comments Morning Noon Evening Bedtime * albuterol 90 mcg/actuation inhaler Commonly known as:  PROAIR HFA Your next dose is: Today, Tomorrow Other:  _________ Dose:  2 Puff Take 2 Puffs by inhalation every four (4) hours as needed for Wheezing. Quantity:  3 Inhaler Refills:  3  
     
   
   
   
  
 * albuterol 90 mcg/actuation inhaler Commonly known as:  PROAIR HFA Your next dose is: Today, Tomorrow Other:  _________ Dose:  2 Puff Take 2 Puffs by inhalation every four (4) hours as needed for Wheezing. Quantity:  3 Inhaler Refills:  3  
     
   
   
   
  
 albuterol-ipratropium 2.5 mg-0.5 mg/3 ml Nebu Commonly known as:  Gilberto Embs Your next dose is: Today, Tomorrow Other:  _________ Dose:  3 mL  
3 mL by Nebulization route every four (4) hours. Indications: CHRONIC OBSTRUCTIVE PULMONARY DISEASE WITH BRONCHOSPASMS Quantity:  30 Vial  
Refills:  11 Dx j44.9 ALPRAZolam 0.5 mg tablet Commonly known as:  Pixie Oz Your next dose is: Today, Tomorrow Other:  _________ Dose:  0.5 mg Take 1 Tab by mouth nightly as needed for Anxiety. Max Daily Amount: 0.5 mg.  
 Quantity:  30 Tab Refills:  2  
     
   
   
   
  
 cyclobenzaprine 10 mg tablet Commonly known as:  FLEXERIL Your next dose is: Today, Tomorrow Other:  _________ Dose:  10 mg Take 1 Tab by mouth three (3) times daily as needed for Muscle Spasm(s). Quantity:  90 Tab Refills:  2  
     
   
   
   
  
 dilTIAZem  mg ER capsule Commonly known as:  CARDIZEM CD Your next dose is: Today, Tomorrow Other:  _________  
   
   
 take 1 capsule by mouth once daily Refills:  0  
     
   
   
   
  
 ergocalciferol 50,000 unit capsule Commonly known as:  ERGOCALCIFEROL Your next dose is: Today, Tomorrow Other:  _________ Dose:  08600 Units Take 1 Cap by mouth every seven (7) days. Quantity:  14 Cap Refills:  1 esomeprazole 40 mg capsule Commonly known as:  Marium Simper Your next dose is: Today, Tomorrow Other:  _________ Dose:  40 mg Take 1 Cap by mouth daily. Quantity:  30 Cap Refills:  2  
     
   
   
   
  
 hydroCHLOROthiazide 12.5 mg tablet Commonly known as:  HYDRODIURIL Your next dose is: Today, Tomorrow Other:  _________ Dose:  12.5 mg Take 1 Tab by mouth daily. Quantity:  30 Tab Refills:  2  
     
   
   
   
  
 inhalational spacing device Your next dose is: Today, Tomorrow Other:  _________ Dose:  1 Each  
1 Each by Does Not Apply route as needed. Quantity:  1 Device Refills:  0  
     
   
   
   
  
 mometasone-formoterol 200-5 mcg/actuation HFA inhaler Commonly known as:  Consraphael [place] Your next dose is: Today, Tomorrow Other:  _________ Dose:  2 Puff Take 2 Puffs by inhalation two (2) times a day. Quantity:  3 Inhaler Refills:  3 NORCO 7.5-325 mg per tablet Generic drug:  HYDROcodone-acetaminophen Your next dose is: Today, Tomorrow Other:  _________ Dose:  1 Tab Take 1 Tab by mouth every six (6) hours as needed for Pain. Refills:  0  
     
   
   
   
  
 nortriptyline 25 mg capsule Commonly known as:  PAMELOR Your next dose is: Today, Tomorrow Other:  _________  
   
   
 take 1 to 2 tablets by mouth at bedtime if needed Refills:  0 Peak Flow Meter Victorine Libman Your next dose is: Today, Tomorrow Other:  _________ Dose:  1 Device 1 Device by Does Not Apply route daily. Quantity:  1 Device Refills:  0  
     
   
   
   
  
 sucralfate 100 mg/mL suspension Commonly known as:  Alphia [place] Your next dose is: Today, Tomorrow Other:  _________ Dose:  1 tsp Take 5 mL by mouth four (4) times daily. Quantity:  600 mL Refills:  5 zolpidem 5 mg tablet Commonly known as:  AMBIEN Your next dose is: Today, Tomorrow Other:  _________ Dose:  5 mg Take 1 Tab by mouth nightly as needed for Sleep. Max Daily Amount: 5 mg. Quantity:  30 Tab Refills:  2  
     
   
   
   
  
 * Notice: This list has 2 medication(s) that are the same as other medications prescribed for you. Read the directions carefully, and ask your doctor or other care provider to review them with you. STOP taking these medications   
 celecoxib 200 mg capsule Commonly known as:  CELEBREX Where to Get Your Medications Information on where to get these meds will be given to you by the nurse or doctor. ! Ask your nurse or doctor about these medications  
  enoxaparin 120 mg/0.8 mL injection  
 warfarin 5 mg tablet Discharge Instructions HOSPITALIST DISCHARGE INSTRUCTIONS 
 
NAME: Amanda Holly :  1960 MRN:  [id number] Date/Time:  3/8/2017 1:16 PM 
 
ADMIT DATE: 3/4/2017 DISCHARGE DATE: 3/8/2017 · It is important that you take the medication exactly as they are prescribed. · Keep your medication in the bottles provided by the pharmacist and keep a list of the medication names, dosages, and times to be taken in your wallet. · Do not take other medications without consulting your doctor. What to do at [facility] Recommended diet:  Cardiac Diet Recommended activity: Activity as tolerated If you have questions regarding the hospital related prescriptions or hospital related issues please call [facility] at . You can always direct your questions to your primary care doctor if you are unable to reach your hospital physician; your PCP works as an extension of your hospital doctor just like your hospital doctor is an extension of your PCP for your time at the hospital [facility]) If you experience any of the following symptoms then please call your primary care physician or return to the emergency room if you cannot get hold of your doctor: 
 
Fever, chills, nausea, vomiting, or persistent diarrhea Worsening weakness or new problems with your speech or balance Dark stools or visible blood in your stools New Leg swelling or shortness of breath as this could be signs of a clot Additional Instructions: Take warfarin 7.5mg on Thursday PT INR this Friday (March 10) and call your doctor for further instructions Talk to your PCP about a referral to see a Hematologist for your positive lupus anticoagulant Information obtained by : 
I understand that if any problems occur once I am at home I am to contact my physician. I understand and acknowledge receipt of the instructions indicated above. Physician's or R.N.'s Signature                                                                  Date/Time Patient or Representative Signature Discharge Instructions Attachments/References WARFARIN: LONG-TERM USE (ENGLISH) Discharge Orders None Introducing [facility] SERVICES! Dear Josue Castaneda: Thank you for requesting a [email or web address] account. Our records indicate that you already have an active [email or web address] account. You can access your account anytime at [email or web address] Did you know that you can access your hospital and ER discharge instructions at any time in [email or web address]? You can also review all of your test results from your hospital stay or ER visit. Additional Information If you have questions, please visit the Frequently Asked Questions section of the [employer] website at [email or web address] [employer]/. Remember, [employer] is NOT to be used for urgent needs. For medical emergencies, dial 911. Now available from your iPhone and Android! General Information Please provide this summary of care documentation to your next provider. Patient Signature:  ____________________________________________________________ Date:  ____________________________________________________________  
  
Velma Jin Provider Signature:  ____________________________________________________________ Date:  ____________________________________________________________ More Information Taking Warfarin Safely: Care Instructions Your Care Instructions Warfarin is a medicine that you take to prevent blood clots. It is often called a blood thinner. Doctors give warfarin (such as Coumadin) to reduce the risk of blood clots. You may be at risk for blood clots if you have atrial fibrillation or deep vein thrombosis. Some other health problems may also put you at risk. Warfarin slows the amount of time it takes for your blood to clot. It can cause bleeding problems. Even if you've been taking warfarin for a while, it's important to know how to take it safely. Foods and other medicines can affect the way warfarin works. Some can make warfarin work too well. This can cause bleeding problems. And some can make it work poorly, so that it does not prevent blood clots very well. You will need regular blood tests to check how long it takes for your blood to form a clot. This test is called a PT or prothrombin time test. The result of the test is called an INR level. Depending on the test results, your doctor or anticoagulation clinic may adjust your dose of warfarin. Follow-up care is a key part of your treatment and safety.  Be sure to make and go to all appointments, and call your doctor if you are having problems. It's also a good idea to know your test results and keep a list of the medicines you take. How can you care for yourself at home? Take warfarin safely · Take your warfarin at the same time each day. · If you miss a dose of warfarin, don't take an extra dose to make up for it. Your doctor can tell you exactly what to do so you don't take too much or too little. · Wear medical alert jewelry that lets others know that you take warfarin. You can buy this at most [employer]. · Don't take warfarin if you are pregnant or planning to get pregnant. Talk to your doctor about how you can prevent getting pregnant while you are taking it. · Don't change your dose or stop taking warfarin unless your doctor tells you to. Effects of medicines and food on warfarin · Don't start or stop taking any medicines, vitamins, or natural remedies unless you first talk to your doctor. Many medicines can affect how warfarin works. These include aspirin and other pain relievers, over-the-counter medicines, multivitamins, dietary supplements, and herbal products. · Tell all of your doctors and pharmacists that you take warfarin. Some prescription medicines can affect how warfarin works. · Keep the amount of vitamin K in your diet about the same from day to day. Do not suddenly eat a lot more or a lot less food that is rich in vitamin K than you usually do. Vitamin K affects how warfarin works and how your blood clots. Talk with your doctor before making big changes in your diet. Vitamin K is in many foods, such as: 
¨ Leafy greens, such as kale, cabbage, spinach, Swiss chard, and lettuce. ¨ Canola and soybean oils. ¨ Green vegetables, such as asparagus, broccoli, and [place] sprouts. ¨ Vegetable drinks, green tea leaves, and some dietary supplement drinks. · Avoid cranberry juice and other cranberry products. They can increase the effects of warfarin. · Limit your use of alcohol. Avoid bleeding by preventing falls and injuries · Wear slippers or shoes with nonskid soles. · Remove throw rugs and clutter. · Rearrange furniture and electrical cords to keep them out of walking paths. · Keep stairways, porches, and outside walkways well lit. Use night-lights in hallways and bathrooms. · Be extra careful when you work with sharp tools or knives. When should you call for help? Call 911 anytime you think you may need emergency care. For example, call if: 
· You have a sudden, severe headache that is different from past headaches. Call your doctor now or seek immediate medical care if: 
· You have any abnormal bleeding, such as: 
¨ Nosebleeds. ¨ Vaginal bleeding that is different (heavier, more frequent, at a different time of the month) than what you are used to. ¨ Bloody or black stools, or rectal bleeding. ¨ Bloody or pink urine. Watch closely for changes in your health, and be sure to contact your doctor if you have any problems. Where can you learn more? Go to http://bernard-octavio.info/. Enter C253 in the search box to learn more about \"Taking Warfarin Safely: Care Instructions. \" Current as of: January 27, 2016 Content Version: 11.1 © 5858-7382 [employer]. Care instructions adapted under license by [employer] (which disclaims liability or warranty for this information). If you have questions about a medical condition or this instruction, always ask your healthcare professional. [place] [address] any warranty or liability for your use of this information.

## 2017-03-05 ENCOUNTER — APPOINTMENT (OUTPATIENT)
Dept: ULTRASOUND IMAGING | Age: 57
DRG: 176 | End: 2017-03-05
Attending: INTERNAL MEDICINE
Payer: COMMERCIAL

## 2017-03-05 LAB
ALBUMIN SERPL BCP-MCNC: 3 G/DL (ref 3.5–5)
ALBUMIN/GLOB SERPL: 0.9 {RATIO} (ref 1.1–2.2)
ALP SERPL-CCNC: 81 U/L (ref 45–117)
ALT SERPL-CCNC: 19 U/L (ref 12–78)
ANION GAP BLD CALC-SCNC: 10 MMOL/L (ref 5–15)
AST SERPL W P-5'-P-CCNC: 26 U/L (ref 15–37)
ATRIAL RATE: 101 BPM
BASOPHILS # BLD AUTO: 0 K/UL (ref 0–0.1)
BASOPHILS # BLD: 0 % (ref 0–1)
BILIRUB SERPL-MCNC: 0.4 MG/DL (ref 0.2–1)
BUN SERPL-MCNC: 9 MG/DL (ref 6–20)
BUN/CREAT SERPL: 12 (ref 12–20)
CALCIUM SERPL-MCNC: 8.5 MG/DL (ref 8.5–10.1)
CALCULATED P AXIS, ECG09: 48 DEGREES
CALCULATED R AXIS, ECG10: -2 DEGREES
CALCULATED T AXIS, ECG11: 39 DEGREES
CHLORIDE SERPL-SCNC: 111 MMOL/L (ref 97–108)
CO2 SERPL-SCNC: 22 MMOL/L (ref 21–32)
CREAT SERPL-MCNC: 0.78 MG/DL (ref 0.55–1.02)
DIAGNOSIS, 93000: NORMAL
DIFFERENTIAL METHOD BLD: ABNORMAL
EOSINOPHIL # BLD: 0 K/UL (ref 0–0.4)
EOSINOPHIL NFR BLD: 0 % (ref 0–7)
ERYTHROCYTE [DISTWIDTH] IN BLOOD BY AUTOMATED COUNT: 14.4 % (ref 11.5–14.5)
EST. AVERAGE GLUCOSE BLD GHB EST-MCNC: 105 MG/DL
GLOBULIN SER CALC-MCNC: 3.3 G/DL (ref 2–4)
GLUCOSE SERPL-MCNC: 123 MG/DL (ref 65–100)
HBA1C MFR BLD: 5.3 % (ref 4.2–6.3)
HCT VFR BLD AUTO: 41.3 % (ref 35–47)
HGB BLD-MCNC: 13.3 G/DL (ref 11.5–16)
INR PPP: 1.1 (ref 0.9–1.1)
LYMPHOCYTES # BLD AUTO: 12 % (ref 12–49)
LYMPHOCYTES # BLD: 0.7 K/UL (ref 0.8–3.5)
MAGNESIUM SERPL-MCNC: 2.2 MG/DL (ref 1.6–2.4)
MCH RBC QN AUTO: 28.6 PG (ref 26–34)
MCHC RBC AUTO-ENTMCNC: 32.2 G/DL (ref 30–36.5)
MCV RBC AUTO: 88.8 FL (ref 80–99)
MONOCYTES # BLD: 0.1 K/UL (ref 0–1)
MONOCYTES NFR BLD AUTO: 2 % (ref 5–13)
NEUTS SEG # BLD: 5.2 K/UL (ref 1.8–8)
NEUTS SEG NFR BLD AUTO: 86 % (ref 32–75)
P-R INTERVAL, ECG05: 112 MS
PLATELET # BLD AUTO: 211 K/UL (ref 150–400)
POTASSIUM SERPL-SCNC: 4.5 MMOL/L (ref 3.5–5.1)
PROT SERPL-MCNC: 6.3 G/DL (ref 6.4–8.2)
PROTHROMBIN TIME: 10.8 SEC (ref 9–11.1)
Q-T INTERVAL, ECG07: 338 MS
QRS DURATION, ECG06: 84 MS
QTC CALCULATION (BEZET), ECG08: 438 MS
RBC # BLD AUTO: 4.65 M/UL (ref 3.8–5.2)
RBC MORPH BLD: ABNORMAL
SODIUM SERPL-SCNC: 143 MMOL/L (ref 136–145)
TROPONIN I SERPL-MCNC: 0.2 NG/ML
TSH SERPL DL<=0.05 MIU/L-ACNC: 0.59 UIU/ML (ref 0.36–3.74)
VENTRICULAR RATE, ECG03: 101 BPM
WBC # BLD AUTO: 6 K/UL (ref 3.6–11)

## 2017-03-05 PROCEDURE — 85025 COMPLETE CBC W/AUTO DIFF WBC: CPT | Performed by: INTERNAL MEDICINE

## 2017-03-05 PROCEDURE — 74011250636 HC RX REV CODE- 250/636: Performed by: INTERNAL MEDICINE

## 2017-03-05 PROCEDURE — 65660000000 HC RM CCU STEPDOWN

## 2017-03-05 PROCEDURE — 74011250637 HC RX REV CODE- 250/637: Performed by: INTERNAL MEDICINE

## 2017-03-05 PROCEDURE — 80053 COMPREHEN METABOLIC PANEL: CPT | Performed by: INTERNAL MEDICINE

## 2017-03-05 PROCEDURE — 93970 EXTREMITY STUDY: CPT

## 2017-03-05 PROCEDURE — 84484 ASSAY OF TROPONIN QUANT: CPT | Performed by: INTERNAL MEDICINE

## 2017-03-05 PROCEDURE — 74011250637 HC RX REV CODE- 250/637: Performed by: NURSE PRACTITIONER

## 2017-03-05 PROCEDURE — 84443 ASSAY THYROID STIM HORMONE: CPT | Performed by: INTERNAL MEDICINE

## 2017-03-05 PROCEDURE — 83735 ASSAY OF MAGNESIUM: CPT | Performed by: INTERNAL MEDICINE

## 2017-03-05 PROCEDURE — 36415 COLL VENOUS BLD VENIPUNCTURE: CPT | Performed by: INTERNAL MEDICINE

## 2017-03-05 PROCEDURE — 83036 HEMOGLOBIN GLYCOSYLATED A1C: CPT | Performed by: INTERNAL MEDICINE

## 2017-03-05 PROCEDURE — 85610 PROTHROMBIN TIME: CPT | Performed by: INTERNAL MEDICINE

## 2017-03-05 RX ORDER — GABAPENTIN 100 MG/1
100 CAPSULE ORAL 3 TIMES DAILY
Status: DISCONTINUED | OUTPATIENT
Start: 2017-03-05 | End: 2017-03-08 | Stop reason: HOSPADM

## 2017-03-05 RX ORDER — IBUPROFEN 600 MG/1
600 TABLET ORAL ONCE
Status: COMPLETED | OUTPATIENT
Start: 2017-03-05 | End: 2017-03-05

## 2017-03-05 RX ORDER — CELECOXIB 200 MG/1
200 CAPSULE ORAL 2 TIMES DAILY
Status: DISCONTINUED | OUTPATIENT
Start: 2017-03-05 | End: 2017-03-08 | Stop reason: HOSPADM

## 2017-03-05 RX ORDER — WARFARIN SODIUM 5 MG/1
10 TABLET ORAL ONCE
Status: COMPLETED | OUTPATIENT
Start: 2017-03-05 | End: 2017-03-05

## 2017-03-05 RX ADMIN — OXYCODONE HYDROCHLORIDE AND ACETAMINOPHEN 1 TABLET: 5; 325 TABLET ORAL at 21:32

## 2017-03-05 RX ADMIN — PANTOPRAZOLE SODIUM 40 MG: 40 TABLET, DELAYED RELEASE ORAL at 21:32

## 2017-03-05 RX ADMIN — IBUPROFEN 600 MG: 600 TABLET, FILM COATED ORAL at 11:41

## 2017-03-05 RX ADMIN — VENLAFAXINE HYDROCHLORIDE 150 MG: 150 CAPSULE, EXTENDED RELEASE ORAL at 21:32

## 2017-03-05 RX ADMIN — ENOXAPARIN SODIUM 120 MG: 60 INJECTION SUBCUTANEOUS at 07:20

## 2017-03-05 RX ADMIN — OXYCODONE HYDROCHLORIDE AND ACETAMINOPHEN 1 TABLET: 5; 325 TABLET ORAL at 17:25

## 2017-03-05 RX ADMIN — OXYCODONE HYDROCHLORIDE AND ACETAMINOPHEN 1 TABLET: 5; 325 TABLET ORAL at 03:19

## 2017-03-05 RX ADMIN — GABAPENTIN 100 MG: 100 CAPSULE ORAL at 21:32

## 2017-03-05 RX ADMIN — GABAPENTIN 100 MG: 100 CAPSULE ORAL at 17:25

## 2017-03-05 RX ADMIN — Medication 2 MG: at 00:22

## 2017-03-05 RX ADMIN — GABAPENTIN 100 MG: 100 CAPSULE ORAL at 11:41

## 2017-03-05 RX ADMIN — WARFARIN SODIUM 10 MG: 5 TABLET ORAL at 17:25

## 2017-03-05 RX ADMIN — SUCRALFATE 0.5 G: 1 SUSPENSION ORAL at 17:25

## 2017-03-05 RX ADMIN — NIFEDIPINE 120 MG: 10 CAPSULE ORAL at 08:37

## 2017-03-05 RX ADMIN — ENOXAPARIN SODIUM 120 MG: 60 INJECTION SUBCUTANEOUS at 19:00

## 2017-03-05 RX ADMIN — CELECOXIB 200 MG: 200 CAPSULE ORAL at 11:41

## 2017-03-05 RX ADMIN — SUCRALFATE 0.5 G: 1 SUSPENSION ORAL at 08:37

## 2017-03-05 RX ADMIN — SUCRALFATE 0.5 G: 1 SUSPENSION ORAL at 21:32

## 2017-03-05 RX ADMIN — NORTRIPTYLINE HYDROCHLORIDE 25 MG: 25 CAPSULE ORAL at 21:32

## 2017-03-05 RX ADMIN — CELECOXIB 200 MG: 200 CAPSULE ORAL at 17:25

## 2017-03-05 NOTE — CONSULTS
Consult    NAME: Merrick Tristan   :  1960   MRN:  [id number]     Date/Time:  3/5/2017 11:24 AM    Patient PCP: Isela Kaiser, NP  ________________________________________________________________________     Assessment:     1. Acute pulmonary embolism  2. Equivocal troponin elevation due to PE  3. Normal nuclear stress in Oct 2016; EF 71%  4. Paroxysmal atrial fibrillation  5. Hypertension  6. Asthma  7. COPD  8. Fibromyalgia  9. Anxiety  10. Depression  11. 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 Usual cardiologist:  Dr. Jose Prasad:     1. Continue lovenox  2. Warfarin has been started  3. Echocardiogram pending  4. Hypercoagulable workup underway   5. Can stop checking troponins; they have peaked. Recent normal nuclear. 6. Says she is not taking diltiazem 120mg daily   7. Will review echo when available    Thank you for this consult and allowing me to take part in this patients care. Please call with questions. [x]        High complexity decision making was performed        Subjective:   CHIEF COMPLAINT: SOB    HISTORY OF PRESENT ILLNESS:     Adam Krause is a 61 y.o.  female who has a \"PMHx of anemia, asthma, COPD, and arrhythmia presenting ambulatory to [facility] c/o SOB since yesterday afternoon. She reports additional symptom of wheezing with her SOB. Pt notes that as she was getting out of the car yesterday, walking into her house she started developing SOB. Her SOB continued through the night, and into the morning. Pt notes that her SOB is exacerbated with exertion. She reports that she has a hx of asthma and COPD, but she has never had SOB this severe. Pt notes that she has been using her rescue inhaler with no relief. She reports that she has a pulse oximeter at home, and at home her O2 Sats were in the 60%s and she had blue colored lips. Pt used a nebulizer treatment ~1 and a half hours ago which brought her O2 Sats to 90%, but then her Sats started to decline again.  In route to ED, she used 2 L O2 which provided relief, but pt notes that normally she is not on O2. Earlier yesterday, pt went to physical therapy, and was fine throughout her appointment. She reports that she had a stress test a couple months ago, and a bronchoscopy recently. Pt denies fever, chills, chest pain, hx of DVT, hx of PE, or recent steroid use. At this time patient seated in bed c/o SOB, pleuritic chest pain, some cough, no sputum, denies N/V no diarrhea, no fever, no urinary symptoms, no other associated symptoms. \"    We were asked to consult for work up and evaluation of the above problems.      Past Medical History:   Diagnosis Date    Anemia 3/15/2013    Arrhythmia     paroxsimal afib    Asthma 3/15/2013    Asthma     Back disorder     disc problem    Chronic obstructive pulmonary disease (HCC)     Morbid obesity ([facility] [address] 3/15/2013      Past Surgical History:   Procedure Laterality Date    HX ADENOIDECTOMY      HX APPENDECTOMY      HX GASTRIC BYPASS  1-15-98    Dr. Marek Moya    HX GASTRIC BYPASS      HX HERNIA REPAIR      HX ORTHOPAEDIC      arm fracture    HX TONSILLECTOMY      HX TUBAL LIGATION  80    HX TUBAL LIGATION       Allergies   Allergen Reactions    Pcn [Penicillins] Rash    Griseofulvin Other (comments)     Aaron-dirk syndrome    Pcn [Penicillins] Rash and Swelling    Tramadol Nausea Only and Drowsiness      Meds:  See below  Social History   Substance Use Topics    Smoking status: Never Smoker    Smokeless tobacco: Never Used    Alcohol use Yes      Family History   Problem Relation Age of Onset    Cancer Sister      lung    Other Mother      fem pop bypass    Bleeding Prob Mother     Heart Disease Mother     Cancer Father      pancreatic    Cancer Brother        REVIEW OF SYSTEMS:     []         Unable to obtain  ROS due to ---   [x]         Total of 12 systems reviewed as follows:    Constitutional: negative fever, negative chills, negative weight loss  Eyes:   negative visual changes  ENT:   negative sore throat, tongue or lip swelling  Respiratory:  negative cough, negative dyspnea  Cards:  negative for chest pain, palpitations, lower extremity edema  GI:   negative for nausea, vomiting, diarrhea, and abdominal pain  Genitourinary: negative for frequency, dysuria  Integument:  negative for rash   Hematologic:  negative for easy bruising and gum/nose bleeding  Musculoskel: negative for myalgias,  back pain  Neurological:  negative for headaches, dizziness, vertigo, weakness  Behavl/Psych: negative for feelings of anxiety, depression     Pertinent Positives include :    Objective:      Physical Exam:    Last 24hrs VS reviewed since prior progress note. Most recent are:    Visit Vitals    BP (!) 111/96 (BP 1 Location: Left arm, BP Patient Position: At rest)    Pulse 94    Temp 97.8 °F (36.6 °C)    Resp 16    Ht 5' 6\" (1.676 m)    Wt 117.3 kg (258 lb 9.6 oz)    SpO2 91%    BMI 41.74 kg/m2       Intake/Output Summary (Last 24 hours) at 03/05/17 1124  Last data filed at 03/05/17 0329   Gross per 24 hour   Intake              600 ml   Output                0 ml   Net              600 ml        General Appearance: Well developed, well nourished, alert & oriented x 3,    no acute distress. Obese  Ears/Nose/Mouth/Throat: Pupils equal and round, Hearing grossly normal.  Neck: Supple. JVP within normal limits. Carotids good upstrokes, with no bruit. Chest: Lungs clear to auscultation bilaterally. Cardiovascular: Regular rate and rhythm, S1S2 normal, no murmur, rubs, gallops. Abdomen: Soft, non-tender, bowel sounds are active. No organomegaly. Extremities: No edema bilaterally. Femoral pulses +2, Distal Pulses +1. Skin: Warm and dry. Neuro: CN II-XII grossly intact, Strength and sensation grossly intact. []         Post-cath site without hematoma, bruit, tenderness, or thrill. Distal pulses intact.     Data:      Telemetry:  ST    EKG:  ST  []  No new EKG for review. Prior to Admission medications    Medication Sig Start Date End Date Taking? Authorizing Provider   venlafaxine-SR [facility] 150 mg capsule Take 1 Cap by mouth daily. Indications: nerves 1/24/17   Jeff Alan NP   albuterol [facility]) 90 mcg/actuation inhaler Take 2 Puffs by inhalation every four (4) hours as needed for Wheezing. 1/24/17   Jeff Alan NP   venlafaxine-SR [facility] 150 mg capsule Take 1 Cap by mouth daily. Indications: nerves 1/23/17   Jeff Alan NP   [facility]) 90 mcg/actuation inhaler Take 2 Puffs by inhalation every four (4) hours as needed for Wheezing. 1/23/17   Jeff Alan NP   hydroCHLOROthiazide (HYDRODIURIL) 12.5 mg tablet Take 1 Tab by mouth daily. 1/20/17   Jeff Alan NP   ergocalciferol (ERGOCALCIFEROL) 50,000 unit capsule Take 1 Cap by mouth every seven (7) days. 1/20/17   Jeff Alan NP   nortriptyline (PAMELOR) 25 mg capsule take 1 to 2 tablets by mouth at bedtime if needed 11/28/16   Historical Provider   dilTIAZem CD (CARDIZEM CD) 120 mg ER capsule take 1 capsule by mouth once daily 10/21/16   Historical Provider   ALPRAZolam Donna Garza) 0.5 mg tablet Take 1 Tab by mouth nightly as needed for Anxiety. Max Daily Amount: 0.5 mg. 1/19/17   Jeff Alan NP   zolpidem (AMBIEN) 5 mg tablet Take 1 Tab by mouth nightly as needed for Sleep. Max Daily Amount: 5 mg. 1/19/17   Jeff Alan NP   HYDROcodone-acetaminophen [facility]) 7.5-325 mg per tablet Take 1 Tab by mouth every six (6) hours as needed for Pain. Historical Provider   celecoxib (CELEBREX) 200 mg capsule 1 pill bid with food 9/28/16   Carmen Adam NP   sucralfate (CARAFATE) 100 mg/mL suspension Take 5 mL by mouth four (4) times daily. 7/25/16   Carmen Adam NP   esomeprazole (NEXIUM) 40 mg capsule Take 1 Cap by mouth daily.  7/25/16   Carmen Adam NP   cyclobenzaprine (FLEXERIL) 10 mg tablet Take 1 Tab by mouth three (3) times daily as needed for Muscle Spasm(s). 7/5/16   Tim Huerta NP   mometasone-formoterol (DULERA) 200-5 mcg/actuation HFA inhaler Take 2 Puffs by inhalation two (2) times a day. 3/9/16   Kendall Rodriguez MD   albuterol-ipratropium (DUO-NEB) 2.5 mg-0.5 mg/3 ml nebulizer solution 3 mL by Nebulization route every four (4) hours. Indications: CHRONIC OBSTRUCTIVE PULMONARY DISEASE WITH BRONCHOSPASMS 11/9/15   Kendall Rodriguez MD   inhalational spacing device 1 Each by Does Not Apply route as needed. 3/9/15   Kendall Rodriguez MD   Peak Flow Meter hussain 1 Device by Does Not Apply route daily.  3/9/15   Kendall Rodriguez MD       Recent Results (from the past 24 hour(s))   EKG, 12 LEAD, INITIAL    Collection Time: 03/04/17  2:58 PM   Result Value Ref Range    Ventricular Rate 101 BPM    Atrial Rate 101 BPM    P-R Interval 112 ms    QRS Duration 84 ms    Q-T Interval 338 ms    QTC Calculation (Bezet) 438 ms    Calculated P Axis 48 degrees    Calculated R Axis -2 degrees    Calculated T Axis 39 degrees    Diagnosis       Sinus tachycardia  Possible Inferior infarct , age undetermined  When compared with ECG of 17-FEB-2004 05:41,  Previous ECG has undetermined rhythm, needs review     URINALYSIS W/ REFLEX CULTURE    Collection Time: 03/04/17  3:46 PM   Result Value Ref Range    Color YELLOW/STRAW      Appearance CLEAR CLEAR      Specific gravity <1.005 1.003 - 1.030    pH (UA) 6.5 5.0 - 8.0      Protein NEGATIVE  NEG mg/dL    Glucose NEGATIVE  NEG mg/dL    Ketone NEGATIVE  NEG mg/dL    Bilirubin NEGATIVE  NEG      Blood NEGATIVE  NEG      Urobilinogen 0.2 0.2 - 1.0 EU/dL    Nitrites NEGATIVE  NEG      Leukocyte Esterase NEGATIVE  NEG      WBC 0-4 0 - 4 /hpf    RBC 0-5 0 - 5 /hpf    Epithelial cells FEW FEW /lpf    Bacteria NEGATIVE  NEG /hpf    UA:UC IF INDICATED CULTURE NOT INDICATED BY UA RESULT CNI     CBC WITH AUTOMATED DIFF    Collection Time: 03/04/17  3:46 PM   Result Value Ref Range    WBC 8.3 3.6 - 11.0 K/uL    RBC 4.94 3.80 - 5.20 M/uL    HGB 14.0 11.5 - 16.0 g/dL    HCT 44.3 35.0 - 47.0 %    MCV 89.7 80.0 - 99.0 FL    MCH 28.3 26.0 - 34.0 PG    MCHC 31.6 30.0 - 36.5 g/dL    RDW 14.2 11.5 - 14.5 %    PLATELET 929 630 - 952 K/uL    NEUTROPHILS 54 32 - 75 %    LYMPHOCYTES 33 12 - 49 %    MONOCYTES 7 5 - 13 %    EOSINOPHILS 5 0 - 7 %    BASOPHILS 1 0 - 1 %    ABS. NEUTROPHILS 4.5 1.8 - 8.0 K/UL    ABS. LYMPHOCYTES 2.7 0.8 - 3.5 K/UL    ABS. MONOCYTES 0.6 0.0 - 1.0 K/UL    ABS. EOSINOPHILS 0.4 0.0 - 0.4 K/UL    ABS. BASOPHILS 0.1 0.0 - 0.1 K/UL   METABOLIC PANEL, COMPREHENSIVE    Collection Time: 03/04/17  3:46 PM   Result Value Ref Range    Sodium 143 136 - 145 mmol/L    Potassium 4.2 3.5 - 5.1 mmol/L    Chloride 108 97 - 108 mmol/L    CO2 27 21 - 32 mmol/L    Anion gap 8 5 - 15 mmol/L    Glucose 98 65 - 100 mg/dL    BUN 9 6 - 20 MG/DL    Creatinine 0.96 0.55 - 1.02 MG/DL    BUN/Creatinine ratio 9 (L) 12 - 20      GFR est AA >60 >60 ml/min/1.73m2    GFR est non-AA 60 (L) >60 ml/min/1.73m2    Calcium 8.9 8.5 - 10.1 MG/DL    Bilirubin, total 0.4 0.2 - 1.0 MG/DL    ALT (SGPT) 16 12 - 78 U/L    AST (SGOT) 17 15 - 37 U/L    Alk.  phosphatase 84 45 - 117 U/L    Protein, total 6.1 (L) 6.4 - 8.2 g/dL    Albumin 3.2 (L) 3.5 - 5.0 g/dL    Globulin 2.9 2.0 - 4.0 g/dL    A-G Ratio 1.1 1.1 - 2.2     TROPONIN I    Collection Time: 03/04/17  3:46 PM   Result Value Ref Range    Troponin-I, Qt. 0.32 (H) <0.05 ng/mL   CK W/ CKMB & INDEX    Collection Time: 03/04/17  3:46 PM   Result Value Ref Range    CK 55 26 - 192 U/L    CK - MB 3.0 <3.6 NG/ML    CK-MB Index 5.5 (H) 0 - 2.5     D DIMER    Collection Time: 03/04/17  3:47 PM   Result Value Ref Range    D-dimer 4.77 (H) 0.00 - 0.65 mg/L FEU   CK W/ CKMB & INDEX    Collection Time: 03/04/17  6:10 PM   Result Value Ref Range    CK 49 26 - 192 U/L    CK - MB 3.0 <3.6 NG/ML    CK-MB Index 6.1 (H) 0 - 2.5     TROPONIN I    Collection Time: 03/04/17  6:10 PM   Result Value Ref Range    Troponin-I, Qt. 0.34 (H) <0.05 ng/mL   FIBRINOGEN    Collection Time: 03/04/17  8:15 PM   Result Value Ref Range    Fibrinogen 353 200 - 475 mg/dL   TROPONIN I    Collection Time: 03/04/17  8:15 PM   Result Value Ref Range    Troponin-I, Qt. 0.34 (H) <0.05 ng/mL   TROPONIN I    Collection Time: 03/05/17  2:32 AM   Result Value Ref Range    Troponin-I, Qt. 0.20 (H) <0.05 ng/mL   CBC WITH AUTOMATED DIFF    Collection Time: 03/05/17  2:32 AM   Result Value Ref Range    WBC 6.0 3.6 - 11.0 K/uL    RBC 4.65 3.80 - 5.20 M/uL    HGB 13.3 11.5 - 16.0 g/dL    HCT 41.3 35.0 - 47.0 %    MCV 88.8 80.0 - 99.0 FL    MCH 28.6 26.0 - 34.0 PG    MCHC 32.2 30.0 - 36.5 g/dL    RDW 14.4 11.5 - 14.5 %    PLATELET 206 936 - 846 K/uL    NEUTROPHILS 86 (H) 32 - 75 %    LYMPHOCYTES 12 12 - 49 %    MONOCYTES 2 (L) 5 - 13 %    EOSINOPHILS 0 0 - 7 %    BASOPHILS 0 0 - 1 %    ABS. NEUTROPHILS 5.2 1.8 - 8.0 K/UL    ABS. LYMPHOCYTES 0.7 (L) 0.8 - 3.5 K/UL    ABS. MONOCYTES 0.1 0.0 - 1.0 K/UL    ABS. EOSINOPHILS 0.0 0.0 - 0.4 K/UL    ABS. BASOPHILS 0.0 0.0 - 0.1 K/UL    RBC COMMENTS NORMOCYTIC, NORMOCHROMIC      DF SMEAR SCANNED     HEMOGLOBIN A1C WITH EAG    Collection Time: 03/05/17  2:32 AM   Result Value Ref Range    Hemoglobin A1c 5.3 4.2 - 6.3 %    Est. average glucose 105 mg/dL   MAGNESIUM    Collection Time: 03/05/17  2:32 AM   Result Value Ref Range    Magnesium 2.2 1.6 - 2.4 mg/dL   METABOLIC PANEL, COMPREHENSIVE    Collection Time: 03/05/17  2:32 AM   Result Value Ref Range    Sodium 143 136 - 145 mmol/L    Potassium 4.5 3.5 - 5.1 mmol/L    Chloride 111 (H) 97 - 108 mmol/L    CO2 22 21 - 32 mmol/L    Anion gap 10 5 - 15 mmol/L    Glucose 123 (H) 65 - 100 mg/dL    BUN 9 6 - 20 MG/DL    Creatinine 0.78 0.55 - 1.02 MG/DL    BUN/Creatinine ratio 12 12 - 20      GFR est AA >60 >60 ml/min/1.73m2    GFR est non-AA >60 >60 ml/min/1.73m2    Calcium 8.5 8.5 - 10.1 MG/DL    Bilirubin, total 0.4 0.2 - 1.0 MG/DL    ALT (SGPT) 19 12 - 78 U/L    AST (SGOT) 26 15 - 37 U/L    Alk. phosphatase 81 45 - 117 U/L    Protein, total 6.3 (L) 6.4 - 8.2 g/dL    Albumin 3.0 (L) 3.5 - 5.0 g/dL    Globulin 3.3 2.0 - 4.0 g/dL    A-G Ratio 0.9 (L) 1.1 - 2.2     PROTHROMBIN TIME + INR    Collection Time: 03/05/17  2:32 AM   Result Value Ref Range    INR 1.1 0.9 - 1.1      Prothrombin time 10.8 9.0 - 11.1 sec   TSH 3RD GENERATION    Collection Time: 03/05/17  2:32 AM   Result Value Ref Range    TSH 0.59 0.36 - 3.74 uIU/mL        Lindsey Justice MD

## 2017-03-05 NOTE — PROGRESS NOTES
Hospitalist Progress Note    NAME: Kristie Collins   :  1960   MRN:  [id number]       Interim Hospital Summary: 61 y.o. female whom presented on 3/4/2017 with      Assessment / Plan:  Acute Pulmonary Embolism  - CTA of chest: Right pulmonary artery PE, with extension into the interlobar, right lower lobe, and right upper lobe pulmonary arteries. Smaller segmental and  subsegmental pulmonary emboli in the bilateral lungs. Right heart strain.   - Hx of paroxysmal a-fib, was following by Dr. Steven Lisa about 8 years ago, but did not kept up with follow up. 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 She states that she will not have health insurance coverage after the end of this month.  - discussed about several options for PE treatment; pt will start on bridge therapy today. Coumadin therapy per pharmacy protocol.    - ECHO pending  - was on O2 at 2L via n/c during early visit; asked nursing staff wean O2 as tolerate    Increased Troponin  - ? secondary to right heart strain, troponin trending down to 0.20  - cardiology consult pending     HTN  - c/w Diltiazem, use labetalol prn. Asthma/COPD  -  not wheezing at this time, will c/w Duonebs prn.  -  Continue Breo (takes dulera 200/5 at home)  -  Has upcoming appt with Pulmonologist on 2017   Anxiety/Depression  -  We will continue her home regimen; Effexor XR 150mg daily, Xanax 0.5mg as need    Fibromyalgia  - Resume celebrex (home medication) twice a day and PRN of flexeril and percocet. Patient was not on PRN opiates at home. Patient expressed that Neurontin has worked to treat her neuropathic pain. But, she stop filling the medication. She would like to restart on Neurontin.   -  Started on Neurontin 100mg TID    GERD: c/w PPI  Code Status: Full Code  Surrogate Decision Maker: son Nelida Nicky   DVT Prophylaxis: Lovenox/coumadin  GI Prophylaxis: on PPI  Baseline: Independent full ADL    Recommended Disposition: Home with Home health nurse     Subjective:     Chief Complaint / Reason for Physician Visit  \"It hurts when I move\". Discussed with RN events overnight. Review of Systems:  Symptom Y/N Comments  Symptom Y/N Comments   Fever/Chills n   Chest Pain n    Poor Appetite    Edema     Cough n   Abdominal Pain     Sputum    Joint Pain     SOB/TURNER y ACTIVITY  Pruritis/Rash     Nausea/vomit n   Tolerating PT/OT     Diarrhea n   Tolerating Diet     Constipation n   Other       Could NOT obtain due to:      Objective:     VITALS:   Last 24hrs VS reviewed since prior progress note. Most recent are:  Patient Vitals for the past 24 hrs:   Temp Pulse Resp BP SpO2   03/05/17 0755 97.8 °F (36.6 °C) 94 16 (!) 111/96 91 %   03/05/17 0456 97.7 °F (36.5 °C) 91 16 122/77 (!) 89 %   03/04/17 2359 97.4 °F (36.3 °C) 89 20 124/72 93 %   03/04/17 2128 98.1 °F (36.7 °C) (!) 110 22 131/89 95 %   03/04/17 2000 - (!) 119 22 109/64 92 %   03/04/17 1914 - (!) 122 16 - 94 %   03/04/17 1738 - (!) 125 20 102/63 -   03/04/17 1700 - (!) 127 21 152/66 94 %   03/04/17 1624 - (!) 110 - 150/79 98 %   03/04/17 1600 - (!) 109 14 135/71 97 %   03/04/17 1545 - 92 10 126/82 98 %   03/04/17 1448 97.4 °F (36.3 °C) 94 22 (!) 148/98 96 %       Intake/Output Summary (Last 24 hours) at 03/05/17 0948  Last data filed at 03/05/17 0329   Gross per 24 hour   Intake              600 ml   Output                0 ml   Net              600 ml        PHYSICAL EXAM:  General: WD, WN. Alert, cooperative, no acute distress    EENT:  EOMI. Anicteric sclerae. MMM  Resp:  CTA bilaterally, no wheezing or rales. No accessory muscle use  CV:  Regular  rhythm,  No edema  GI:  Soft, Non distended, Non tender.  +Bowel sounds  Neurologic:  Alert and oriented X 3, normal speech,   Psych:   Good insight. Not anxious nor agitated  Skin:  No rashes.   No jaundice    Reviewed most current lab test results and cultures  YES  Reviewed most current radiology test results   YES  Review and summation of old records today    NO  Reviewed patient's current orders and MAR    YES  PMH/SH reviewed - no change compared to H&P  ________________________________________________________________________  Care Plan discussed with:    Comments   Patient y    Family      RN y    Care Manager     Consultant                       y Multidiciplinary team rounds were held today with , nursing, pharmacist and clinical coordinator. Patient's plan of care was discussed; medications were reviewed and discharge planning was addressed. ________________________________________________________________________  Total NON critical care TIME:  30   Minutes    Total CRITICAL CARE TIME Spent:   Minutes non procedure based      Comments   >50% of visit spent in counseling and coordination of care     ________________________________________________________________________  Christian Gammons, NP     Procedures: see electronic medical records for all procedures/Xrays and details which were not copied into this note but were reviewed prior to creation of Plan. LABS:  I reviewed today's most current labs and imaging studies.   Pertinent labs include:  Recent Labs      03/05/17   0232  03/04/17   1546   WBC  6.0  8.3   HGB  13.3  14.0   HCT  41.3  44.3   PLT  211  209     Recent Labs      03/05/17   0232  03/04/17   1546   NA  143  143   K  4.5  4.2   CL  111*  108   CO2  22  27   GLU  123*  98   BUN  9  9   CREA  0.78  0.96   CA  8.5  8.9   MG  2.2   --    ALB  3.0*  3.2*   TBILI  0.4  0.4   SGOT  26  17   ALT  19  16   INR  1.1   --        Signed: )Larry Rivera, NP

## 2017-03-05 NOTE — PROGRESS NOTES
Pharmacy Dosing of Warfarin    Pharmacy consulted to dose warfarin for this  61 y.o. female ordered warfarin for Acute PE    Goal INR: 2-3    Date           INR     Dose  03/5    1.1     Home Regimen:  Warfarin Naive    Concurrent anticoagulants: Lovenox 1 mg/kg Q12H (120 mg Q12H)  Major Interacting Medications (Dose/Frequency):  none  Platelet Inhibitors (Dose/Frequency): none  INR (0.9-1.1) > 5 discuss with MD:   Recent Labs      03/05/17   0232  03/04/17   1546   INR  1.1   --    HGB  13.3  14.0   HCT  41.3  44.3   PLT  211  209     Child Jimenez Score, if applicable: n/a  Warfarin Sensitivity Factors Present: none    Impression/Plan:   - Provider consulted pharmacy to start warfarin dosing for anticoagulation bridge. This is a warfarin naive patient.   - Pt's INR today (1.1) is sub-therapeutic for goal of 2-3.   - Will order Warfarin 10 mg x1 for tonight.   - Continue w/ Lovenox 120 mg subQ Q12H, dosed appropriately for indication & renal fxn. - Active order for daily PT/INR. Pharmacy will follow daily and adjust the dose as appropriate. Thank you for the consult,  Mendy Calderon, PharmD.

## 2017-03-05 NOTE — H&P
Hospitalist Admission Note    NAME: Ana Burch   :  1960   MRN:  [id number]     Date/Time:  3/4/2017 7:30 PM    Patient PCP: Mari Gibbons NP  ________________________________________________________________________    My assessment of this patient's clinical condition and my plan of care is as follows. Assessment / Plan:  Acute Pulmonary Embolism: with right heart strain, will start Lovenox, check hypercoagulable panel, ECHO, Low extremity duplex, patient has no insurance and may not be candidate for noble anticoagulants, may be ready to start coumadin as soon as tomorrow. Increased Troponin: 2ry to right heart strain, check serial enzymes and ECHO, get Cardiology input. HTN/Arrhytmia: c/w Diltiazem, use labetalol prn. Asthma/COPD: not wheezing at this time, will c/w Duonebs prn. Anxiety/Depression: c/w home regimen  Fibromyalgia: c/w pain medication. GERD: c/w PPI  Code Status: Full Code  Surrogate Decision Maker: kelly Jose   DVT Prophylaxis: Lovenox  GI Prophylaxis: on PPI  Baseline: Independent full ADL        Subjective:   CHIEF COMPLAINT: \"I'm SOB, my chest hurts\"    HISTORY OF PRESENT ILLNESS:     Wendy David is a 61 y.o.  female  with PMHx of anemia, asthma, COPD, and arrhythmia presenting ambulatory to [id number] [place] [address] c/o SOB since yesterday afternoon. She reports additional symptom of wheezing with her SOB. Pt notes that as she was getting out of the car yesterday, walking into her house she started developing SOB. Her SOB continued through the night, and into the morning. Pt notes that her SOB is exacerbated with exertion. She reports that she has a hx of asthma and COPD, but she has never had SOB this severe. Pt notes that she has been using her rescue inhaler with no relief. She reports that she has a pulse oximeter at home, and at home her O2 Sats were in the 60%s and she had blue colored lips.  Pt used a nebulizer treatment ~1 and a half hours ago which brought her O2 Sats to 90%, but then her Sats started to decline again. In route to ED, she used 2 L O2 which provided relief, but pt notes that normally she is not on O2. Earlier yesterday, pt went to physical therapy, and was fine throughout her appointment. She reports that she had a stress test a couple months ago, and a bronchoscopy recently. Pt denies fever, chills, chest pain, hx of DVT, hx of PE, or recent steroid use. At this time patient seated in bed c/o SOB, pleuritic chest pain, some cough, no sputum, denies N/V no diarrhea, no fever, no urinary symptoms, no other associated symptoms,. We were asked to admit for work up and evaluation of the above problems. Past Medical History:   Diagnosis Date    Anemia 3/15/2013    Arrhythmia     paroxsimal afib    Asthma 3/15/2013    Asthma     Back disorder     disc problem    Chronic obstructive pulmonary disease (HCC)     Morbid obesity ([address] 3/15/2013        Past Surgical History:   Procedure Laterality Date    HX ADENOIDECTOMY      HX APPENDECTOMY      HX GASTRIC BYPASS  1-15-98    Dr. Preethi Ortez    HX GASTRIC BYPASS      [address]      HX ORTHOPAEDIC      arm fracture    HX TONSILLECTOMY      HX TUBAL LIGATION  80    HX TUBAL LIGATION         Social History   Substance Use Topics    Smoking status: Never Smoker    Smokeless tobacco: Never Used    Alcohol use Yes        Family History   Problem Relation Age of Onset   [place] Cancer Sister      lung    Other Mother      fem pop bypass    Bleeding Prob Mother     Heart Disease Mother     Cancer Father      pancreatic    Cancer Brother      Allergies   Allergen Reactions    Pcn [Penicillins] Rash    Griseofulvin Other (comments)     Aaron-dirk syndrome    Pcn [Penicillins] Rash and Swelling    Tramadol Nausea Only and Drowsiness        Prior to Admission medications    Medication Sig Start Date End Date Taking?  Authorizing Provider   venlafaxine-SR [facility] 150 mg capsule Take 1 Cap by mouth daily. Indications: nerves 1/24/17   Gerardine Campanile, NP   [facility]buterol [facility] 90 mcg/actuation inhaler Take 2 Puffs by inhalation every four (4) hours as needed for Wheezing. 1/24/17   Gerardine Martínezanile, NP   venlafaxine-SR [facility] 150 mg capsule Take 1 Cap by mouth daily. Indications: nerves 1/23/17   Gerardine Campanile, NP   albuterol [facility]) 90 mcg/actuation inhaler Take 2 Puffs by inhalation every four (4) hours as needed for Wheezing. 1/23/17   Gerjacksonne Claudiae NP   hydroCHLOROthiazide (HYDRODIURIL) 12.5 mg tablet Take 1 Tab by mouth daily. 1/20/17   Gerjacksonne Claudiae NP   ergocalciferol (ERGOCALCIFEROL) 50,000 unit capsule Take 1 Cap by mouth every seven (7) days. 1/20/17   Gerevy Lara NP   nortriptyline (PAMELOR) 25 mg capsule take 1 to 2 tablets by mouth at bedtime if needed 11/28/16   Historical Provider   dilTIAZem CD (CARDIZEM CD) 120 mg ER capsule take 1 capsule by mouth once daily 10/21/16   Historical Provider   ALPRAZolam Dorean Floro) 0.5 mg tablet Take 1 Tab by mouth nightly as needed for Anxiety. Max Daily Amount: 0.5 mg. 1/19/17   Gerevy Lara NP   zolpidem (AMBIEN) 5 mg tablet Take 1 Tab by mouth nightly as needed for Sleep. Max Daily Amount: 5 mg. 1/19/17   Gerjacksonne ClaudiaeEDILSON   HYDROcodone-acetaminophen [facility]) 7.5-325 mg per tablet Take 1 Tab by mouth every six (6) hours as needed for Pain. Historical Provider   celecoxib (CELEBREX) 200 mg capsule 1 pill bid with food 9/28/16   Ruth Rosales NP   sucralfate (CARAFATE) 100 mg/mL suspension Take 5 mL by mouth four (4) times daily. 7/25/16   Ruth Rosales NP   esomeprazole (NEXIUM) 40 mg capsule Take 1 Cap by mouth daily. 7/25/16   Ruth Rosales NP   cyclobenzaprine (FLEXERIL) 10 mg tablet Take 1 Tab by mouth three (3) times daily as needed for Muscle Spasm(s). 7/5/16   Ruth Rosales NP   mometasone-formoterol (DULERA) 200-5 mcg/actuation HFA inhaler Take 2 Puffs by inhalation two (2) times a day. 3/9/16   Elham Kitchen MD   albuterol-ipratropium (DUO-NEB) 2.5 mg-0.5 mg/3 ml nebulizer solution 3 mL by Nebulization route every four (4) hours. Indications: CHRONIC OBSTRUCTIVE PULMONARY DISEASE WITH BRONCHOSPASMS 11/9/15   Elham Kitchen MD   inhalational spacing device 1 Each by Does Not Apply route as needed. 3/9/15   Elham Kitchen MD   Peak Flow Meter hussain 1 Device by Does Not Apply route daily. 3/9/15   Elham Kitchen MD       REVIEW OF SYSTEMS:     I am not able to complete the review of systems because:    The patient is intubated and sedated    The patient has altered mental status due to his acute medical problems    The patient has baseline aphasia from prior stroke(s)    The patient has baseline dementia and is not reliable historian    The patient is in acute medical distress and unable to provide information           Total of 12 systems reviewed as follows:       POSITIVE= underlined text  Negative = text not underlined  General:  fever, chills, sweats, generalized weakness, weight loss/gain,      loss of appetite   Eyes:    blurred vision, eye pain, loss of vision, double vision  ENT:    rhinorrhea, pharyngitis   Respiratory:   cough, sputum production, SOB, TURNER, wheezing, pleuritic pain   Cardiology:   chest pain, palpitations, orthopnea, PND, edema, syncope   Gastrointestinal:  abdominal pain , N/V, diarrhea, dysphagia, constipation, bleeding   Genitourinary:  frequency, urgency, dysuria, hematuria, incontinence   Muskuloskeletal :  arthralgia, myalgia, back pain  Hematology:  easy bruising, nose or gum bleeding, lymphadenopathy   Dermatological: rash, ulceration, pruritis, color change / jaundice  Endocrine:   hot flashes or polydipsia   Neurological:  headache, dizziness, confusion, focal weakness, paresthesia,     Speech difficulties, memory loss, gait difficulty  Psychological: Feelings of anxiety, depression, agitation    Objective:   VITALS:    Visit Vitals    BP 102/63 (BP 1 Location: Left arm, BP Patient Position: At rest)    Pulse (!) 122    Temp 97.4 °F (36.3 °C)    Resp 16    Ht 5' 6\" (1.676 m)    Wt 117.3 kg (258 lb 9.6 oz)    SpO2 94%    BMI 41.74 kg/m2       PHYSICAL EXAM:    General:    Alert, cooperative, SOB, appears stated age. HEENT: Atraumatic, anicteric sclerae, pink conjunctivae     No oral ulcers, mucosa moist, throat clear, dentition fair  Neck:  Supple, symmetrical,  thyroid: non tender  Lungs:   Coarse BS, rhonchi  Chest wall:  No tenderness  No Accessory muscle use. Heart:   Regular  rhythm,  No  murmur   No edema  Abdomen:   Soft, non-tender. Not distended. Bowel sounds normal  Extremities: No cyanosis. No clubbing      Capillary refill normal,  Radial pulse 2+  Skin:     Not pale. Not Jaundiced  No rashes   Psych:  Good insight. Not depressed. Not anxious or agitated. Neurologic: EOMs intact. No facial asymmetry. No aphasia or slurred speech. Symmetrical strength, Sensation grossly intact.  Alert and oriented X 4.     _______________________________________________________________________  Care Plan discussed with:    Comments   Patient y    Family  y    RN y    Care Manager                    Consultant:  arslan GARDNER   _______________________________________________________________________  Expected  Disposition:   Home with Family y   HH/PT/OT/RN y   SNF/LTC    LEENA    ________________________________________________________________________  TOTAL TIME:  61 Minutes    Critical Care Provided     Minutes non procedure based      Comments    y Reviewed previous records   >50% of visit spent in counseling and coordination of care y Discussion with patient and/or family and questions answered       ________________________________________________________________________  Signed: Joseph Granda MD    Procedures: see electronic medical records for all procedures/Xrays and details which were not copied into this note but were reviewed prior to creation of Plan. LAB DATA REVIEWED:    Recent Results (from the past 24 hour(s))   EKG, 12 LEAD, INITIAL    Collection Time: 03/04/17  2:58 PM   Result Value Ref Range    Ventricular Rate 101 BPM    Atrial Rate 101 BPM    P-R Interval 112 ms    QRS Duration 84 ms    Q-T Interval 338 ms    QTC Calculation (Bezet) 438 ms    Calculated P Axis 48 degrees    Calculated R Axis -2 degrees    Calculated T Axis 39 degrees    Diagnosis       Sinus tachycardia  Possible Inferior infarct , age undetermined  When compared with ECG of 17-FEB-2004 05:41,  Previous ECG has undetermined rhythm, needs review     URINALYSIS W/ REFLEX CULTURE    Collection Time: 03/04/17  3:46 PM   Result Value Ref Range    Color YELLOW/STRAW      Appearance CLEAR CLEAR      Specific gravity <1.005 1.003 - 1.030    pH (UA) 6.5 5.0 - 8.0      Protein NEGATIVE  NEG mg/dL    Glucose NEGATIVE  NEG mg/dL    Ketone NEGATIVE  NEG mg/dL    Bilirubin NEGATIVE  NEG      Blood NEGATIVE  NEG      Urobilinogen 0.2 0.2 - 1.0 EU/dL    Nitrites NEGATIVE  NEG      Leukocyte Esterase NEGATIVE  NEG      WBC 0-4 0 - 4 /hpf    RBC 0-5 0 - 5 /hpf    Epithelial cells FEW FEW /lpf    Bacteria NEGATIVE  NEG /hpf    UA:UC IF INDICATED CULTURE NOT INDICATED BY UA RESULT CNI     CBC WITH AUTOMATED DIFF    Collection Time: 03/04/17  3:46 PM   Result Value Ref Range    WBC 8.3 3.6 - 11.0 K/uL    RBC 4.94 3.80 - 5.20 M/uL    HGB 14.0 11.5 - 16.0 g/dL    HCT 44.3 35.0 - 47.0 %    MCV 89.7 80.0 - 99.0 FL    MCH 28.3 26.0 - 34.0 PG    MCHC 31.6 30.0 - 36.5 g/dL    RDW 14.2 11.5 - 14.5 %    PLATELET 177 069 - 441 K/uL    NEUTROPHILS 54 32 - 75 %    LYMPHOCYTES 33 12 - 49 %    MONOCYTES 7 5 - 13 %    EOSINOPHILS 5 0 - 7 %    BASOPHILS 1 0 - 1 %    ABS. NEUTROPHILS 4.5 1.8 - 8.0 K/UL    ABS. LYMPHOCYTES 2.7 0.8 - 3.5 K/UL    ABS. MONOCYTES 0.6 0.0 - 1.0 K/UL    ABS. EOSINOPHILS 0.4 0.0 - 0.4 K/UL    ABS.  BASOPHILS 0.1 0.0 - 0.1 K/UL   METABOLIC PANEL, COMPREHENSIVE    Collection Time: 03/04/17  3:46 PM   Result Value Ref Range    Sodium 143 136 - 145 mmol/L    Potassium 4.2 3.5 - 5.1 mmol/L    Chloride 108 97 - 108 mmol/L    CO2 27 21 - 32 mmol/L    Anion gap 8 5 - 15 mmol/L    Glucose 98 65 - 100 mg/dL    BUN 9 6 - 20 MG/DL    Creatinine 0.96 0.55 - 1.02 MG/DL    BUN/Creatinine ratio 9 (L) 12 - 20      GFR est AA >60 >60 ml/min/1.73m2    GFR est non-AA 60 (L) >60 ml/min/1.73m2    Calcium 8.9 8.5 - 10.1 MG/DL    Bilirubin, total 0.4 0.2 - 1.0 MG/DL    ALT (SGPT) 16 12 - 78 U/L    AST (SGOT) 17 15 - 37 U/L    Alk.  phosphatase 84 45 - 117 U/L    Protein, total 6.1 (L) 6.4 - 8.2 g/dL    Albumin 3.2 (L) 3.5 - 5.0 g/dL    Globulin 2.9 2.0 - 4.0 g/dL    A-G Ratio 1.1 1.1 - 2.2     TROPONIN I    Collection Time: 03/04/17  3:46 PM   Result Value Ref Range    Troponin-I, Qt. 0.32 (H) <0.05 ng/mL   CK W/ CKMB & INDEX    Collection Time: 03/04/17  3:46 PM   Result Value Ref Range    CK 55 26 - 192 U/L    CK - MB 3.0 <3.6 NG/ML    CK-MB Index 5.5 (H) 0 - 2.5     D DIMER    Collection Time: 03/04/17  3:47 PM   Result Value Ref Range    D-dimer 4.77 (H) 0.00 - 0.65 mg/L FEU   CK W/ CKMB & INDEX    Collection Time: 03/04/17  6:10 PM   Result Value Ref Range    CK 49 26 - 192 U/L    CK - MB 3.0 <3.6 NG/ML    CK-MB Index 6.1 (H) 0 - 2.5     TROPONIN I    Collection Time: 03/04/17  6:10 PM   Result Value Ref Range    Troponin-I, Qt. 0.34 (H) <0.05 ng/mL

## 2017-03-05 NOTE — PROGRESS NOTES
TRANSFER - IN REPORT:    Verbal report received from ER(name) on Inge Ho  being received from ER(unit) for routine progression of care      Report consisted of patients Situation, Background, Assessment and   Recommendations(SBAR). Information from the following report(s) SBAR, Kardex, Intake/Output, MAR and Recent Results was reviewed with the receiving nurse. Opportunity for questions and clarification was provided. Assessment completed upon patients arrival to unit and care assumed. Primary Nurse Syd Salas RN and Navi Garcia RN performed a dual skin assessment on this patient No impairment noted  Alonso score is 21        [facility] SHIFT NURSING NOTE    Bedside shift change report given to Taylor Aguilar (oncoming nurse) by Edith Hicks (offgoing nurse). Report included the following information SBAR, Kardex, Intake/Output, MAR and Recent Results.     SHIFT SUMMARY:         Admission Date 3/4/2017   Admission Diagnosis Acute pulmonary embolism ([facility] [address]   Consults IP CONSULT TO CARDIOLOGY        Consults   [x] PT   [x] OT   [] Speech   [] Palliative      [] Hospice    [x] Case Management   [] None   Cardiac Monitoring   [x] Yes   [] No     Antibiotics   [] Yes   [x] No   GI Prophylaxis  (Ex: Protonix, Pepcid, etc,.)   [x] Yes   [] No          DVT Prophylaxis   SCDs:             Niall stockings:         [x] Medication (Ex: Lovenox, Eliquis, Brilinta, Coumadin,  Heparin, etc..)   [] Contraindicated   [] No VTE needed       Urinary Catheter             LDAs               Peripheral IV 03/04/17 Left Wrist (Active)   Site Assessment Clean, dry, & intact 3/4/2017  9:36 PM   Phlebitis Assessment 0 3/4/2017  9:36 PM   Infiltration Assessment 0 3/4/2017  9:36 PM   Dressing Status Clean, dry, & intact 3/4/2017  9:36 PM   Dressing Type Transparent 3/4/2017  9:36 PM   Hub Color/Line Status Pink;Capped 3/4/2017  9:36 PM       Peripheral IV 03/04/17 Left Antecubital (Active)   Site Assessment Clean, dry, & intact 3/4/2017 9:36 PM   Phlebitis Assessment 0 3/4/2017  9:36 PM   Infiltration Assessment 0 3/4/2017  9:36 PM   Dressing Status Clean, dry, & intact 3/4/2017  9:36 PM   Dressing Type Transparent 3/4/2017  9:36 PM   Hub Color/Line Status Pink;Capped 3/4/2017  9:36 PM                      I/Os   Intake/Output Summary (Last 24 hours) at 03/04/17 2147  Last data filed at 03/04/17 2136   Gross per 24 hour   Intake                0 ml   Output                0 ml   Net                0 ml         Activity Level           Diet Active Orders   Diet    DIET CARDIAC Regular      Purposeful Rounding every 1-2 hour? [x] Yes    Ke Score  Total Score: 1   Bed Alarm (If score 3 or >)   [x] Yes    [] Refused (See signed refusal form in chart)   Alonso Score  Alonso Score: 21       Alonso Score (if score 14 or less)   [] PMT consult   [] Nutrition consult   [] Wound Care consult      []  Specialty bed         Influenza Vaccine                 Needs prior to discharge:   Home O2 required:    [] Yes   [x] No     If yes, how much O2 required?     Other:        Readmission Risk Assessment Tool Score Low Risk            6       Total Score        4 More than 1 Admission in calendar year    2 Charlson Comorbidity Score        Criteria that do not apply:    Relationship with PCP    Patient Living Status    Patient Length of Stay > 5    Patient Insurance is Medicare, Medicaid or Self Pay       Expected Length of Stay - - -   Actual Length of Stay 0

## 2017-03-05 NOTE — ED NOTES
TRANSFER - OUT REPORT:    Verbal report given to Chris Rudd RN (name) on Ana Burch  being transferred to room [phone] (unit) for routine progression of care       Report consisted of patients Situation, Background, Assessment and   Recommendations(SBAR). Information from the following report(s) SBAR, ED Summary, MAR, Recent Results and Cardiac Rhythm sinus tach. was reviewed with the receiving nurse. Lines:   Peripheral IV 03/04/17 Left Wrist (Active)   Site Assessment Clean, dry, & intact 3/4/2017  4:03 PM   Phlebitis Assessment 0 3/4/2017  4:03 PM   Infiltration Assessment 0 3/4/2017  4:03 PM   Dressing Status Clean, dry, & intact 3/4/2017  4:03 PM       Peripheral IV 03/04/17 Left Antecubital (Active)   Site Assessment Clean, dry, & intact 3/4/2017  5:39 PM   Phlebitis Assessment 0 3/4/2017  5:39 PM   Infiltration Assessment 0 3/4/2017  5:39 PM   Dressing Status Clean, dry, & intact 3/4/2017  5:39 PM        Opportunity for questions and clarification was provided.

## 2017-03-06 LAB
ANA SER QL: NEGATIVE
BACTERIA URNS QL MICRO: NEGATIVE /HPF
EPITH CASTS URNS QL MICRO: NORMAL /LPF
INR PPP: 1.1 (ref 0.9–1.1)
PROTHROMBIN TIME: 10.9 SEC (ref 9–11.1)
RBC #/AREA URNS HPF: NORMAL /HPF (ref 0–5)
SEE BELOW:, 164879: NORMAL
WBC URNS QL MICRO: NORMAL /HPF (ref 0–4)

## 2017-03-06 PROCEDURE — 74011250636 HC RX REV CODE- 250/636: Performed by: INTERNAL MEDICINE

## 2017-03-06 PROCEDURE — 85610 PROTHROMBIN TIME: CPT | Performed by: INTERNAL MEDICINE

## 2017-03-06 PROCEDURE — 93306 TTE W/DOPPLER COMPLETE: CPT

## 2017-03-06 PROCEDURE — 97165 OT EVAL LOW COMPLEX 30 MIN: CPT

## 2017-03-06 PROCEDURE — 81015 MICROSCOPIC EXAM OF URINE: CPT | Performed by: NURSE PRACTITIONER

## 2017-03-06 PROCEDURE — 97535 SELF CARE MNGMENT TRAINING: CPT

## 2017-03-06 PROCEDURE — 65660000000 HC RM CCU STEPDOWN

## 2017-03-06 PROCEDURE — 74011250637 HC RX REV CODE- 250/637: Performed by: NURSE PRACTITIONER

## 2017-03-06 PROCEDURE — 97116 GAIT TRAINING THERAPY: CPT | Performed by: PHYSICAL THERAPIST

## 2017-03-06 PROCEDURE — 97161 PT EVAL LOW COMPLEX 20 MIN: CPT | Performed by: PHYSICAL THERAPIST

## 2017-03-06 PROCEDURE — 77010033678 HC OXYGEN DAILY

## 2017-03-06 PROCEDURE — 74011250637 HC RX REV CODE- 250/637: Performed by: INTERNAL MEDICINE

## 2017-03-06 PROCEDURE — 36415 COLL VENOUS BLD VENIPUNCTURE: CPT | Performed by: INTERNAL MEDICINE

## 2017-03-06 RX ORDER — WARFARIN 7.5 MG/1
7.5 TABLET ORAL ONCE
Status: COMPLETED | OUTPATIENT
Start: 2017-03-06 | End: 2017-03-06

## 2017-03-06 RX ORDER — PHENAZOPYRIDINE HYDROCHLORIDE 100 MG/1
200 TABLET, FILM COATED ORAL
Status: DISCONTINUED | OUTPATIENT
Start: 2017-03-06 | End: 2017-03-08 | Stop reason: HOSPADM

## 2017-03-06 RX ADMIN — SUCRALFATE 0.5 G: 1 SUSPENSION ORAL at 17:32

## 2017-03-06 RX ADMIN — ENOXAPARIN SODIUM 120 MG: 60 INJECTION SUBCUTANEOUS at 07:26

## 2017-03-06 RX ADMIN — GABAPENTIN 100 MG: 100 CAPSULE ORAL at 15:13

## 2017-03-06 RX ADMIN — CELECOXIB 200 MG: 200 CAPSULE ORAL at 17:32

## 2017-03-06 RX ADMIN — FLUTICASONE FUROATE AND VILANTEROL TRIFENATATE 1 PUFF: 200; 25 POWDER RESPIRATORY (INHALATION) at 10:02

## 2017-03-06 RX ADMIN — SUCRALFATE 0.5 G: 1 SUSPENSION ORAL at 13:33

## 2017-03-06 RX ADMIN — WARFARIN SODIUM 7.5 MG: 7.5 TABLET ORAL at 17:32

## 2017-03-06 RX ADMIN — PANTOPRAZOLE SODIUM 40 MG: 40 TABLET, DELAYED RELEASE ORAL at 21:27

## 2017-03-06 RX ADMIN — VENLAFAXINE HYDROCHLORIDE 150 MG: 150 CAPSULE, EXTENDED RELEASE ORAL at 21:27

## 2017-03-06 RX ADMIN — ENOXAPARIN SODIUM 120 MG: 60 INJECTION SUBCUTANEOUS at 19:04

## 2017-03-06 RX ADMIN — CELECOXIB 200 MG: 200 CAPSULE ORAL at 08:13

## 2017-03-06 RX ADMIN — SUCRALFATE 0.5 G: 1 SUSPENSION ORAL at 08:13

## 2017-03-06 RX ADMIN — PHENAZOPYRIDINE HYDROCHLORIDE 200 MG: 100 TABLET ORAL at 15:13

## 2017-03-06 RX ADMIN — GABAPENTIN 100 MG: 100 CAPSULE ORAL at 21:27

## 2017-03-06 RX ADMIN — GABAPENTIN 100 MG: 100 CAPSULE ORAL at 08:13

## 2017-03-06 RX ADMIN — NORTRIPTYLINE HYDROCHLORIDE 25 MG: 25 CAPSULE ORAL at 21:27

## 2017-03-06 NOTE — PROGRESS NOTES
[address] SHIFT NURSING NOTE    Bedside shift change report given to Manpreet Patel (oncoming nurse) by Sary Diaz (offgoing nurse). Report included the following information SBAR, Kardex, Intake/Output, MAR and Recent Results. SHIFT SUMMARY:         Admission Date 3/4/2017   Admission Diagnosis Acute pulmonary embolism ([facility] [address]   Consults IP CONSULT TO CARDIOLOGY        Consults   [x] PT   [x] OT   [] Speech   [] Palliative      [] Hospice    [x] Case Management   [] None   Cardiac Monitoring   [x] Yes   [] No     Antibiotics   [] Yes   [x] No   GI Prophylaxis  (Ex: Protonix, Pepcid, etc,.)   [x] Yes   [] No          DVT Prophylaxis   SCDs:             Niall stockings:         [x] Medication (Ex: Lovenox, Eliquis, Brilinta, Coumadin,  Heparin, etc..)   [] Contraindicated   [] No VTE needed       Urinary Catheter             LDAs               Peripheral IV 03/04/17 Left Wrist (Active)   Site Assessment Clean, dry, & intact 3/5/2017  7:34 PM   Phlebitis Assessment 0 3/5/2017  7:34 PM   Infiltration Assessment 0 3/5/2017  7:34 PM   Dressing Status Clean, dry, & intact; Clean 3/5/2017  7:34 PM   Dressing Type Transparent 3/5/2017  7:34 PM   Hub Color/Line Status Pink;Capped 3/5/2017  7:34 PM   Alcohol Cap Used No 3/5/2017  3:10 PM       Peripheral IV 03/04/17 Left Antecubital (Active)   Site Assessment Clean, dry, & intact 3/5/2017  7:34 PM   Phlebitis Assessment 0 3/5/2017  7:34 PM   Infiltration Assessment 0 3/5/2017  7:34 PM   Dressing Status Clean, dry, & intact 3/5/2017  7:34 PM   Dressing Type Transparent 3/5/2017  7:34 PM   Hub Color/Line Status Pink;Capped 3/5/2017  7:34 PM   Alcohol Cap Used No 3/5/2017  3:10 PM                      I/Os   Intake/Output Summary (Last 24 hours) at 03/05/17 1936  Last data filed at 03/05/17 1934   Gross per 24 hour   Intake              600 ml   Output                0 ml   Net              600 ml         Activity Level Activity Level: Up ad rocio     Activity Assistance: No assistance needed Diet Active Orders   Diet    DIET CARDIAC Regular      Purposeful Rounding every 1-2 hour? [x] Yes    Ke Score  Total Score: 1   Bed Alarm (If score 3 or >)   [] Yes    [] Refused (See signed refusal form in chart)   Alonso Score  Alonso Score: 21       Alonso Score (if score 14 or less)   [] PMT consult   [] Nutrition consult   [] Wound Care consult      []  Specialty bed         Influenza Vaccine Received Flu Vaccine for Current Season (usually Sept-March): Yes               Needs prior to discharge:   Home O2 required:    [] Yes   [x] No     If yes, how much O2 required?     Other:    Last Bowel Movement Date: 03/04/17   Readmission Risk Assessment Tool Score Low Risk            9       Total Score        3 Relationship with PCP    4 More than 1 Admission in calendar year    2 Charlson Comorbidity Score        Criteria that do not apply:    Patient Living Status    Patient Length of Stay > 5    Patient Insurance is Medicare, Medicaid or Self Pay       Expected Length of Stay - - -   Actual Length of Stay 1

## 2017-03-06 NOTE — CARDIO/PULMONARY
C/p rehab note- chart reviewed for in basket referral. Adm with Acute PE. HX includes A-fib,Anemia,Asthma,COPD. Nonsmoker. Echo pending. Will follow for teaching if appropriate.

## 2017-03-06 NOTE — PROGRESS NOTES
Occupational Therapy EVALUATION/discharge  Patient: Bill Mcconnell (8 y.o. female)  Date: 3/6/2017  Primary Diagnosis: Acute pulmonary embolism (HCC)        Precautions: Universal       ASSESSMENT:   Based on the objective data described below, the patient presents at baseline for basic ADL tasks s/p acute PE. She lives alone with her dogs and does not work secondary to [employer] injury. Independent in all ADL tasks without AD. Demonstrates ability to complete toileting, LB dressing and self care transfers indep-mod indep on 2 L O2. Mild TURNER, resolved with seated rest breaks. SpO2 >93% on 2 L O2. Provided education energy conservation techniques for pacing, planning and rest breaks with good understanding. Further skilled acute occupational therapy is not indicated at this time. Discharge Recommendations: None  Further Equipment Recommendations for Discharge: None for OT      SUBJECTIVE:   Patient stated I am much better now.     OBJECTIVE DATA SUMMARY:   HISTORY:   Past Medical History:   Diagnosis Date    Anemia 3/15/2013    Arrhythmia     paroxsimal afib    Asthma 3/15/2013    Asthma     Back disorder     disc problem    Chronic obstructive pulmonary disease (HCC)     Morbid obesity ([facility] [address] 3/15/2013     Past Surgical History:   Procedure Laterality Date    HX ADENOIDECTOMY      HX APPENDECTOMY      HX GASTRIC BYPASS  1-15-98    Dr. Dionna Davalos    HX GASTRIC BYPASS      [address]      HX ORTHOPAEDIC      arm fracture    HX TONSILLECTOMY      HX TUBAL LIGATION  80    HX TUBAL LIGATION         Prior Level of Function/Home Situation: Home alone, independent ADL tasks. Denies falls. Two dogs. Family in area.  Not working    Home Situation  Home Environment: Private residence  # Steps to Enter: 3  One/Two Story Residence: One story  Living Alone: Yes  Support Systems: Child(stephen)  Patient Expects to be Discharged to[de-identified] Private residence  Current DME Used/Available at Home: [address] [facility], straight  Tub or Shower Type: Tub/Shower combination (walk in tub)  [x]  Right hand dominant   []  Left hand dominant    EXAMINATION OF PERFORMANCE DEFICITS:  Cognitive/Behavioral Status:  Neurologic State: Alert  Orientation Level: Oriented X4    Skin: uppers intact    Edema: uppers normal    Vision/Perceptual:      Acuity: Within Defined Limits (cataracts)    Corrective Lenses: Glasses  Range of Motion:  AROM: Within functional limits BUE        Strength:  Strength: Within functional limits- BUE    Coordination:     Fine Motor Skills-Upper: Left Intact; Right Intact    Gross Motor Skills-Upper: Left Intact; Right Intact    Tone & Sensation:  Tone: BUE normal  Sensation: BUE intact to touch          Balance:  Sitting: Intact  Standing: Intact    Functional Mobility and Transfers for ADLs:  Bed Mobility:  Supine to Sit: Modified independent  Scooting: Modified independent    Transfers:  Sit to Stand: Modified independent  Stand to Sit: Modified independent  Toilet Transfer : Modified independent    ADL Assessment:  Feeding: Independent  Oral Facial Hygiene/Grooming: Independent  Bathing: Independent  Upper Body Dressing: Independent  Lower Body Dressing: Modified independent  Toileting: Modified independent       Functional Measure:  Barthel Index:    Bathin  Bladder: 10  Bowels: 10  Groomin  Dressing: 10  Feeding: 10  Mobility: 0  Stairs: 0  Toilet Use: 10  Transfer (Bed to Chair and Back): 10  Total: 70       Barthel and G-code impairment scale:  Percentage of impairment CH  0% CI  1-19% CJ  20-39% CK  40-59% CL  60-79% CM  80-99% CN  100%   Barthel Score 0-100 100 99-80 79-60 59-40 20-39 1-19   0   Barthel Score 0-20 20 17-19 13-16 9-12 5-8 1-4 0      The Barthel ADL Index: Guidelines  1. The index should be used as a record of what a patient does, not as a record of what a patient could do. 2. The main aim is to establish degree of independence from any help, physical or verbal, however minor and for whatever reason.   3. The need for supervision renders the patient not independent. 4. A patient's performance should be established using the best available evidence. Asking the patient, friends/relatives and nurses are the usual sources, but direct observation and common sense are also important. However direct testing is not needed. 5. Usually the patient's performance over the preceding 24-48 hours is important, but occasionally longer periods will be relevant. 6. Middle categories imply that the patient supplies over 50 per cent of the effort. 7. Use of aids to be independent is allowed. Marques Jimenes., Barthel, DAbdoulW. ([id number]). Functional evaluation: the Barthel Index. [facility] (14)2. Evelio Strickland remington ROWENA Moura, Ty Jon., Antonio Byrd., Hang Kimball, [address] (1999). Measuring the change indisability after inpatient rehabilitation; comparison of the responsiveness of the Barthel Index and Functional [place] Measure. Journal of Neurology, Neurosurgery, and Psychiatry, 66(4), 724-410. Pari Woodward NAbdoulJHANNAH, RIKY Saeed, & Rebekah Chery MHANNAH. (2004.) Assessment of post-stroke quality of life in cost-effectiveness studies: The usefulness of the Barthel Index and the EuroQoL-5D. Quality of Life Research, 13, 497-         G codes: In compliance with CMSs Claims Based Outcome Reporting, the following G-code set was chosen for this patient based on their primary functional limitation being treated: The outcome measure chosen to determine the severity of the functional limitation was the Barthel with a score of 70/100 which was correlated with the impairment scale. ?  Self Care:     - CURRENT STATUS: CJ - 20%-39% impaired, limited or restricted    - GOAL STATUS: CJ - 20%-39% impaired, limited or restricted    - D/C STATUS:  CJ - 20%-39% impaired, limited or restricted     Occupational Therapy Evaluation Charge Determination   History Examination Decision-Making   LOW Complexity : Brief history review LOW Complexity : 1-3 performance deficits relating to physical, cognitive , or psychosocial skils that result in activity limitations and / or participation restrictions  LOW Complexity : No comorbidities that affect functional and no verbal or physical assistance needed to complete eval tasks       Based on the above components, the patient evaluation is determined to be of the following complexity level: LOW   Pain:   Denies    Activity Tolerance:   SpO2 >93% on 2 L O2. After treatment:   [x]  Patient left in no apparent distress sitting up in chair  []  Patient left in no apparent distress in bed  []  Call bell left within reach  [x]  Nursing notified  [x]  Caregiver present  []  Bed alarm activated    COMMUNICATION/EDUCATION:   Communication/Collaboration:  [x]      Home safety education was provided and the patient/caregiver indicated understanding. [x]      Patient/family have participated as able and agree with findings and recommendations. []      Patient is unable to participate in plan of care at this time.   Findings and recommendations were discussed with: Physical Therapist, Registered Nurse and Patient    Hari Kessler OT  Time Calculation: 15 mins

## 2017-03-06 NOTE — PROGRESS NOTES
Pt was admitted from home through the ED with dx: acute PE and increased troponin. Pt is a pleasant alert and oriented X 4 female who lives alone in her two story home. She uses a cane with ambulation. She drives. She has a nebulizer. She gets her medications filled at [facility]. Pt has just started outpatient PT services. She is active with her PCP Mickey Gutierrez MD at [facility] as there previous PCP left the office. Pt is a RN with [employer]. She has been out on short term disability over 6 months and will be losing her health insurance 3-31-17. She cannot afford to COBRA. She has applied for long term disability with her employer. She is also interested in applying for social security disability. Pt plans to establish with the [address] [place] [address] () once her health insurance expires. She knows where it is located. I have given pt the information on the [facility],  It is a walk in clinic and she cannot establish with them until she is uninsured. I have provided a list of what she needs to bring with her for the financial screening on her first visit. Pt is currently on O2, CM will continue to follow to assess for home needs. Discussed the cost of home O2 should she require O2 after her insurance has . She voiced concern, but stated \"if you need it you need it\". Referral sent via E-Mail to [facility] requesting a screening for the [place] and assistance with a SS disability application. Care Management Interventions  PCP Verified by CM:  Yes Mickey Gutierrez MD at [facility])  Transition of Care Consult (CM Consult): Discharge Planning  Discharge Durable Medical Equipment: No (uses a cane)  Physical Therapy Consult: Yes  Occupational Therapy Consult: Yes  Speech Therapy Consult: No  Current Support Network: Own Home, Lives Alone  Confirm Follow Up Transport: Self  Plan discussed with Pt/Family/Caregiver: Yes  Discharge Location  Discharge Placement: [address] [facility], [address] [place], [place] [id number]

## 2017-03-06 NOTE — PROGRESS NOTES
Spiritual Care Partner Volunteer visited patient in cardiopulmonary care on 3/6/17. Documented by:  Rev. Dennis Mccall D.Min, MA, [facility]    Lead  Profession Development & Advancement

## 2017-03-06 NOTE — PROGRESS NOTES
Bedside shift change report given to Kat Grace (oncoming nurse) by Lindsey (offgoing nurse). Report included the following information SBAR, Kardex, Procedure Summary, Intake/Output, MAR and Recent Results.

## 2017-03-06 NOTE — PROGRESS NOTES
Pharmacy Dosing of Warfarin  Pharmacy consulted to dose warfarin for this  61 y.o. female ordered warfarin for Acute PE    Goal INR: 2-3    Date           INR     Dose  03/5    1.1      10 mg  03/6               1.1      7.5 mg    Home Regimen:  Warfarin Naive    Concurrent anticoagulants: Lovenox 1 mg/kg Q12H (120 mg Q12H)  Major Interacting Medications (Dose/Frequency):  none  Platelet Inhibitors (Dose/Frequency): none  INR (0.9-1.1) > 5 discuss with MD:   Recent Labs      03/05/17   0232  03/04/17   1546   INR  1.1   --    HGB  13.3  14.0   HCT  41.3  44.3   PLT  211  209     Child Jimenez Score, if applicable: n/a  Warfarin Sensitivity Factors Present: none    Impression/Plan:   - Provider consulted pharmacy to start warfarin dosing for anticoagulation bridge. This is a warfarin naive patient. Pt expressed during rounds today that she takes bactrim apophylactically for UTI. We warned her on rounds that this combo with warfarin is not advisable due to the severe drug-drug interaction.    - Pt's INR today (1.1) is sub-therapeutic for goal of 2-3.   - Will order Warfarin 7.5 mg x1 for tonight.   - Continue w/ Lovenox 120 mg subQ Q12H, dosed appropriately for indication & renal fxn. - Active order for daily PT/INR. Discharge in a few days, home on possible Bridge with Lovenox depending on insurance and how fast INR begins to rise. Pharmacy will follow daily and adjust the dose as appropriate.   Terrance Hart, [facility]

## 2017-03-06 NOTE — PROGRESS NOTES
Problem: Mobility Impaired (Adult and Pediatric)  Goal: *Acute Goals and Plan of Care (Insert Text)  Physical Therapy Goals  Initiated 3/6/2017  1. Patient will move from supine to sit and sit to supine in bed with modified independence within 7 day(s). 2. Patient will transfer from bed to chair and chair to bed with modified independence using the least restrictive device within 7 day(s). 3. Patient will perform sit to stand with modified independence within 7 day(s). 4. Patient will ambulate with modified independence for 200 feet with the least restrictive device within 7 day(s). 5. Patient will ascend/descend 4 stairs with 1 handrail(s) with modified independence within 7 day(s). PHYSICAL THERAPY EVALUATION  Patient: Kristie Collins (53 y.o. female)  Date: 3/6/2017  Primary Diagnosis: Acute pulmonary embolism ([facility] [address]        Precautions:          ASSESSMENT :  Based on the objective data described below, the patient presents with decreased functional mobility from baseline level of function. Prior to admit patient was independent and active. Drives and lives alone in a 1 level home with ЕКАТЕРИНА. Does not use O2 at home. Currently needs supervision for mobility and has been up to the restroom ad rocio. Amb with steady gait but demo's decreased activity tolerance. Visible TURNER with minimal activity. SpO2 drops to 90% when ambulating to restroom without O2. Lengthened O2 cord so patient can reach restroom and advised her to keep O2 in place during ambulation at this time. Will follow 3x/week for activity tolerance but will likely not require any skilled PT at discharge. Patient will benefit from skilled intervention to address the above impairments.   Patients rehabilitation potential is considered to be Good  Factors which may influence rehabilitation potential include:   [X]         None noted  [ ]         Mental ability/status  [ ]         Medical condition  [ ]         Home/family situation and support systems  [ ]         Safety awareness  [ ]         Pain tolerance/management  [ ]         Other:        PLAN :  Recommendations and Planned Interventions:  [X]           Bed Mobility Training             [ ]    Neuromuscular Re-Education  [X]           Transfer Training                   [ ]    Orthotic/Prosthetic Training  [X]           Gait Training                         [ ]    Modalities  [X]           Therapeutic Exercises           [ ]    Edema Management/Control  [X]           Therapeutic Activities            [X]    Patient and Family Training/Education  [ ]           Other (comment):     Frequency/Duration: Patient will be followed by physical therapy  3 times a week to address goals. Discharge Recommendations: None  Further Equipment Recommendations for Discharge: TBD       SUBJECTIVE:   Patient stated I don't usually use any O2 at home but I think I need it right now.       OBJECTIVE DATA SUMMARY:   HISTORY:    Past Medical History:   Diagnosis Date    Anemia 3/15/2013    Arrhythmia       paroxsimal afib    Asthma 3/15/2013    Asthma      Back disorder       disc problem    Chronic obstructive pulmonary disease (HCC)      Morbid obesity ([facility] [address] 3/15/2013     Past Surgical History:   Procedure Laterality Date    HX ADENOIDECTOMY        HX APPENDECTOMY        HX GASTRIC BYPASS   1-15-98     Dr. Marek Moya    HX GASTRIC BYPASS        [address]        HX ORTHOPAEDIC         arm fracture    HX TONSILLECTOMY        HX TUBAL LIGATION   80    HX TUBAL LIGATION         Prior Level of Function/Home Situation: Independent with mobility. Drives.   Lives alone  Personal factors and/or comorbidities impacting plan of care:      Home Situation  Home Environment: Private residence  One/Two Story Residence: One story  Living Alone: Yes  Support Systems: Child(stephen)  Patient Expects to be Discharged to[de-identified] Private residence  Current DME Used/Available at Home: None EXAMINATION/PRESENTATION/DECISION MAKING:   Critical Behavior:   Alert and oriented x 4              Strength:    Strength: Generally decreased, functional                                                   Range Of Motion:  AROM: Generally decreased, functional                             Functional Mobility:  Bed Mobility:     Supine to Sit: Modified independent     Scooting: Modified independent  Transfers:  Sit to Stand: Modified independent  Stand to Sit: Modified independent                       Balance:   Sitting: Intact  Standing: Intact  Ambulation/Gait Training:  Distance (ft): 15 Feet (ft) (x 2)  Assistive Device: Gait belt  Ambulation - Level of Assistance: Supervision        Gait Abnormalities: Decreased step clearance        Base of Support: Widened     Speed/Esther: Pace decreased (<100 feet/min); Slow  Step Length: Left shortened;Right shortened        Functional Measure:  Barthel Index:      Bathin  Bladder: 10  Bowels: 10  Groomin  Dressing: 10  Feeding: 10  Mobility: 0  Stairs: 0  Toilet Use: 10  Transfer (Bed to Chair and Back): 10  Total: 70         Barthel and G-code impairment scale:  Percentage of impairment CH  0% CI  1-19% CJ  20-39% CK  40-59% CL  60-79% CM  80-99% CN  100%   Barthel Score 0-100 100 99-80 79-60 59-40 20-39 1-19    0   Barthel Score 0-20 20 17-19 13-16 9-12 5-8 1-4 0      The Barthel ADL Index: Guidelines  1. The index should be used as a record of what a patient does, not as a record of what a patient could do. 2. The main aim is to establish degree of independence from any help, physical or verbal, however minor and for whatever reason. 3. The need for supervision renders the patient not independent. 4. A patient's performance should be established using the best available evidence. Asking the patient, friends/relatives and nurses are the usual sources, but direct observation and common sense are also important. However direct testing is not needed.   5. Usually the patient's performance over the preceding 24-48 hours is important, but occasionally longer periods will be relevant. 6. Middle categories imply that the patient supplies over 50 per cent of the effort. 7. Use of aids to be independent is allowed. Nonie Freshwater., Barthel, D.W. (0093). Functional evaluation: the Barthel Index. [facility] (14)2. ROWENA Angulo, Hali Lee., Nickjohn Laguna., Helen Dumont, [address] [place] [address] (1999). Measuring the change indisability after inpatient rehabilitation; comparison of the responsiveness of the Barthel Index and Functional [place] Measure. Journal of Neurology, Neurosurgery, and Psychiatry, 66(4), 185-710. Earline Hough, [place].COLTON, RIKY Saeed, & Danita Gould M.A. (2004.) Assessment of post-stroke quality of life in cost-effectiveness studies: The usefulness of the Barthel Index and the EuroQoL-5D. Quality of Life Research, 13, 411-49            G codes: In compliance with CMSs Claims Based Outcome Reporting, the following G-code set was chosen for this patient based on their primary functional limitation being treated: The outcome measure chosen to determine the severity of the functional limitation was the Barthel with a score of 70/100 which was correlated with the impairment scale.       · Mobility - Walking and Moving Around:               - CURRENT STATUS:    CJ - 20%-39% impaired, limited or restricted               - GOAL STATUS:           CI - 1%-19% impaired, limited or restricted               - D/C STATUS:                       ---------------To be determined---------------      Physical Therapy Evaluation Charge Determination   History Examination Presentation Decision-Making   HIGH Complexity :3+ comorbidities / personal factors will impact the outcome/ POC  MEDIUM Complexity : 3 Standardized tests and measures addressing body structure, function, activity limitation and / or participation in recreation  MEDIUM Complexity : Evolving with changing characteristics  Other outcome measures Barthel 70/100  MEDIUM      Based on the above components, the patient evaluation is determined to be of the following complexity level: MEDIUM     Pain:  Pain Scale 1: Numeric (0 - 10)  Pain Intensity 1: 0              Activity Tolerance:   90% on room air with activity. Visible TURNER   Please refer to the flowsheet for vital signs taken during this treatment. After treatment:   [X]         Patient left in no apparent distress sitting up in chair  [ ]         Patient left in no apparent distress in bed  [X]         Call bell left within reach  [X]         Nursing notified  [ ]         Caregiver present  [ ]         Bed alarm activated      COMMUNICATION/EDUCATION:   The patients plan of care was discussed with: Physical Therapist and Registered Nurse.  [X]         Fall prevention education was provided and the patient/caregiver indicated understanding. [X]         Patient/family have participated as able in goal setting and plan of care. [X]         Patient/family agree to work toward stated goals and plan of care. [ ]         Patient understands intent and goals of therapy, but is neutral about his/her participation. [ ]         Patient is unable to participate in goal setting and plan of care.      Thank you for this referral.  Carmina Irwin, PT, DPT   Time Calculation: 12 mins

## 2017-03-06 NOTE — PROGRESS NOTES
Hospitalist Progress Note    NAME: Mike Frnak   :  1960   MRN:  [id number]       Interim Hospital Summary: 61 y.o. female whom presented on 3/4/2017 with      Assessment / Plan:  Acute Pulmonary Embolism  - CTA of chest: Right pulmonary artery PE, with extension into the interlobar, right lower lobe, and right upper lobe pulmonary arteries. Smaller segmental and  subsegmental pulmonary emboli in the bilateral lungs. Right heart strain.   - Hx of paroxysmal a-fib, was following by Dr. Dangelo Ceja about 8 years ago, but did not kept up with follow up. 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 She states that she will not have health insurance coverage after the end of this month.  - discussed about several options for PE treatment; pt will start on bridge therapy today. Coumadin therapy per pharmacy protocol. INR 1.1  - ECHO result pending  - was on O2 at 2L via n/c during early visit; asked nursing staff wean O2 as tolerate     Increased Troponin  - ? secondary to right heart strain, troponin trending down to 0.20  - cardiology following      HTN  - c/w Diltiazem, use labetalol prn.     Asthma/COPD  -  not wheezing at this time, will c/w Duonebs prn.  - Continue Breo (takes dulera 200/5 at home)  - Has upcoming appt with Pulmonologist on 2017     Anxiety/Depression  - We will continue her home regimen; Effexor XR 150mg daily, Xanax 0.5mg as need     Fibromyalgia  - Resume celebrex (home medication) twice a day and PRN of flexeril and percocet. Patient was not on PRN opiates at home. Patient expressed that Neurontin has worked to treat her neuropathic pain. But, she stop filling the medication. She would like to restart on Neurontin.   - Started on Neurontin 100mg TID    Urinary urgency/ spasm (hx of chronic cystitis; follows by Dr. Lisette Colon, )  - repeat U/A done which is (-), started on pyridium    Morbid obesity  -  BMI of 41.74, height of 5'6\" and a weight of 258 lbs. GERD: c/w PPI  Code Status: Full Code  Surrogate Decision Maker: kelly Jensen Bottom   DVT Prophylaxis: Lovenox/coumadin  GI Prophylaxis: on PPI  Baseline: Independent full ADL     Recommended Disposition: Home with Home health nurse       Subjective:     Chief Complaint / Reason for Physician Visit  \"i usually take daily bactrim for chronic cystitis, I am experiencing some urinary spasm\". Discussed with RN events overnight. Review of Systems:  Symptom Y/N Comments  Symptom Y/N Comments   Fever/Chills n   Chest Pain n    Poor Appetite    Edema     Cough n   Abdominal Pain     Sputum n   Joint Pain     SOB/TURNER y   Pruritis/Rash     Nausea/vomit n   Tolerating PT/OT     Diarrhea n   Tolerating Diet     Constipation n   Other       Could NOT obtain due to:      Objective:     VITALS:   Last 24hrs VS reviewed since prior progress note. Most recent are:  Patient Vitals for the past 24 hrs:   Temp Pulse Resp BP SpO2   03/06/17 1553 97.5 °F (36.4 °C) 74 18 127/72 95 %   03/06/17 0812 98.1 °F (36.7 °C) 77 18 133/90 95 %   03/06/17 0323 97.8 °F (36.6 °C) 81 18 114/44 96 %   03/05/17 2306 97.9 °F (36.6 °C) 80 18 133/75 96 %   03/05/17 1931 97.8 °F (36.6 °C) 75 18 139/43 95 %       Intake/Output Summary (Last 24 hours) at 03/06/17 1728  Last data filed at 03/06/17 0324   Gross per 24 hour   Intake              500 ml   Output                0 ml   Net              500 ml        PHYSICAL EXAM:  General: WD, WN. Alert, cooperative, no acute distress    EENT:  EOMI. Anicteric sclerae. MMM  Resp:  CTA bilaterally, no wheezing or rales. No accessory muscle use  CV:  Regular  rhythm,  No edema  GI:  Soft, obese, Non tender.  +Bowel sounds  Neurologic:  Alert and oriented X 3, normal speech,   Psych:   Good insight. Not anxious nor agitated  Skin:  No rashes.   No jaundice    Reviewed most current lab test results and cultures YES  Reviewed most current radiology test results   YES  Review and summation of old records today    NO  Reviewed patient's current orders and MAR    YES  PMH/SH reviewed - no change compared to H&P  ________________________________________________________________________  Care Plan discussed with:    Comments   Patient y    Family  y    RN y    Care Manager y    Consultant                       y Multidiciplinary team rounds were held today with , nursing, pharmacist and clinical coordinator. Patient's plan of care was discussed; medications were reviewed and discharge planning was addressed. ________________________________________________________________________  Total NON critical care TIME:  30  Minutes    Total CRITICAL CARE TIME Spent:   Minutes non procedure based      Comments   >50% of visit spent in counseling and coordination of care     ________________________________________________________________________  Clarance Holes, NP     Procedures: see electronic medical records for all procedures/Xrays and details which were not copied into this note but were reviewed prior to creation of Plan. LABS:  I reviewed today's most current labs and imaging studies.   Pertinent labs include:  Recent Labs      03/05/17   0232  03/04/17   1546   WBC  6.0  8.3   HGB  13.3  14.0   HCT  41.3  44.3   PLT  211  209     Recent Labs      03/06/17   0306  03/05/17   0232  03/04/17   1546   NA   --   143  143   K   --   4.5  4.2   CL   --   111*  108   CO2   --   22  27   GLU   --   123*  98   BUN   --   9  9   CREA   --   0.78  0.96   CA   --   8.5  8.9   MG   --   2.2   --    ALB   --   3.0*  3.2*   TBILI   --   0.4  0.4   SGOT   --   26  17   ALT   --   19  16   INR  1.1  1.1   --        Signed: )Larry Klein, NP

## 2017-03-06 NOTE — CDMP QUERY
Dr. Yisel Corley MD :    The [address] [place] [address] has issued a statement indicating that, \"Individuals who are overweight, obese, or morbidly obese are at an increased risk for certain medical conditions when compared to persons of normal weight. Therefore, these conditions are always clinically significant and reportable when documented by the provider. \"    Review of the documentation for this patient demonstrates the clinical indicators of a BMI of 41.74, height of 5'6\" and a weight of 258 lbs.     Please clarify if the patient has a diagnosis associated with those findings:    _____ Obesity  _____ Morbid Obesity  _____ Overweight  _____ Other weight status (specify status)  _____ Unable to determine    Thank Nadege Aldana  [phone]

## 2017-03-07 LAB
ANION GAP BLD CALC-SCNC: 8 MMOL/L (ref 5–15)
AT III PPP CHRO-ACNC: 126 % (ref 75–135)
BUN SERPL-MCNC: 16 MG/DL (ref 6–20)
BUN/CREAT SERPL: 21 (ref 12–20)
CALCIUM SERPL-MCNC: 8.2 MG/DL (ref 8.5–10.1)
CHLORIDE SERPL-SCNC: 111 MMOL/L (ref 97–108)
CO2 SERPL-SCNC: 25 MMOL/L (ref 21–32)
CREAT SERPL-MCNC: 0.75 MG/DL (ref 0.55–1.02)
DRVVT MIX, 117894: 49.6 SEC (ref 0–44)
DRVVT RATIO 500585: 1.5 RATIO (ref 0.8–1.2)
GLUCOSE SERPL-MCNC: 78 MG/DL (ref 65–100)
HEXAGONAL PHASE PHOSPHOLIPID, 117839: 28 SEC (ref 0–11)
INR PPP: 1.3 (ref 0.9–1.1)
LA PPP-IMP: ABNORMAL
POTASSIUM SERPL-SCNC: 4.3 MMOL/L (ref 3.5–5.1)
PROT C PPP-ACNC: 113 % (ref 73–180)
PROT S ACT/NOR PPP: 112 % (ref 63–140)
PROTHROMBIN TIME: 12.8 SEC (ref 9–11.1)
PTT-LA MIX, LUPR1T: 51.1 SEC (ref 0–40.6)
SCREEN APTT: 54.6 SEC (ref 0–43.6)
SCREEN DRVVT: 69.7 SEC (ref 0–44)
SODIUM SERPL-SCNC: 144 MMOL/L (ref 136–145)

## 2017-03-07 PROCEDURE — 36415 COLL VENOUS BLD VENIPUNCTURE: CPT | Performed by: NURSE PRACTITIONER

## 2017-03-07 PROCEDURE — 74011250637 HC RX REV CODE- 250/637: Performed by: NURSE PRACTITIONER

## 2017-03-07 PROCEDURE — 85610 PROTHROMBIN TIME: CPT | Performed by: INTERNAL MEDICINE

## 2017-03-07 PROCEDURE — 74011250637 HC RX REV CODE- 250/637: Performed by: INTERNAL MEDICINE

## 2017-03-07 PROCEDURE — 65660000000 HC RM CCU STEPDOWN

## 2017-03-07 PROCEDURE — 80048 BASIC METABOLIC PNL TOTAL CA: CPT | Performed by: NURSE PRACTITIONER

## 2017-03-07 PROCEDURE — 74011250636 HC RX REV CODE- 250/636: Performed by: INTERNAL MEDICINE

## 2017-03-07 RX ORDER — WARFARIN SODIUM 5 MG/1
5 TABLET ORAL ONCE
Status: COMPLETED | OUTPATIENT
Start: 2017-03-07 | End: 2017-03-07

## 2017-03-07 RX ADMIN — PANTOPRAZOLE SODIUM 40 MG: 40 TABLET, DELAYED RELEASE ORAL at 21:39

## 2017-03-07 RX ADMIN — PHENAZOPYRIDINE HYDROCHLORIDE 200 MG: 100 TABLET ORAL at 10:48

## 2017-03-07 RX ADMIN — WARFARIN SODIUM 5 MG: 5 TABLET ORAL at 18:33

## 2017-03-07 RX ADMIN — VENLAFAXINE HYDROCHLORIDE 150 MG: 150 CAPSULE, EXTENDED RELEASE ORAL at 21:39

## 2017-03-07 RX ADMIN — CELECOXIB 200 MG: 200 CAPSULE ORAL at 18:33

## 2017-03-07 RX ADMIN — GABAPENTIN 100 MG: 100 CAPSULE ORAL at 08:23

## 2017-03-07 RX ADMIN — NORTRIPTYLINE HYDROCHLORIDE 25 MG: 25 CAPSULE ORAL at 21:39

## 2017-03-07 RX ADMIN — GABAPENTIN 100 MG: 100 CAPSULE ORAL at 21:39

## 2017-03-07 RX ADMIN — SUCRALFATE 0.5 G: 1 SUSPENSION ORAL at 08:22

## 2017-03-07 RX ADMIN — GABAPENTIN 100 MG: 100 CAPSULE ORAL at 18:33

## 2017-03-07 RX ADMIN — ENOXAPARIN SODIUM 120 MG: 60 INJECTION SUBCUTANEOUS at 08:22

## 2017-03-07 RX ADMIN — ENOXAPARIN SODIUM 120 MG: 60 INJECTION SUBCUTANEOUS at 20:07

## 2017-03-07 RX ADMIN — FLUTICASONE FUROATE AND VILANTEROL TRIFENATATE 1 PUFF: 200; 25 POWDER RESPIRATORY (INHALATION) at 10:48

## 2017-03-07 RX ADMIN — CELECOXIB 200 MG: 200 CAPSULE ORAL at 08:23

## 2017-03-07 NOTE — PROGRESS NOTES
Pharmacy Dosing of Warfarin  Pharmacy consulted to dose warfarin for this 61 y.o. female ordered warfarin for Acute PE     Goal INR: 2-3     Date   INR Dose     1.1 10 mg    1.1 7.5 mg     Home Regimen:  Warfarin Naive     Concurrent anticoagulants: Lovenox 1 mg/kg (120mg) Q12H   Major Interacting Medications: none  Platelet Inhibitors: none  Estimated Creatinine Clearance: 107.8 mL/min (based on Cr of 0.75). Estimated Creatinine Clearance (using IBW): 77.5 mL/min  Recent Labs      17   0452  17   0306  17   0232  17   1546   CREA  0.75   --   0.78  0.96   BUN  16   --   9  9   WBC   --    --   6.0  8.3   NA  144   --   143  143   K  4.3   --   4.5  4.2   MG   --    --   2.2   --    CA  8.2*   --   8.5  8.9   ALB   --    --   3.0*  3.2*   HGB   --    --   13.3  14.0   HCT   --    --   41.3  44.3   PLT   --    --   211  209   INR  1.3*  1.1  1.1   --    ALT   --    --   19  16     Temp (24hrs), Av.6 °F (36.4 °C), Min:97.5 °F (36.4 °C), Max:97.8 °F (36.6 °C)    Child Jimenez Score, if applicable: n/a  Warfarin Sensitivity Factors Present: none     Impression/Plan:   Average daily dose 8.75mg last two days for new Warfarin dosing regimen. Warfarin 5mg today. Continue Lovenox bridge therapy. Pharmacy will follow daily and adjust the dose as appropriate.   Thank you,  Jeramie Augustin, [facility]

## 2017-03-07 NOTE — PROGRESS NOTES
Cardiopulmonary Care Interdisciplinary rounds were held today to discuss patient plan of care and outcomes. The following members were present: PT, NP/Physician, Pharmacy, Nursing, Nutritionist and Case Management.       Plan of Care: Continue current treatment plan  Wean O2; Lovenox - Coumadin bridge - need insurance authorization; coumadin teaching will provide by pharmacist

## 2017-03-07 NOTE — PROGRESS NOTES
Progress Note      3/7/2017 4:09 PM  NAME: Carlos Alberto Nelson   MRN:  [id number]   Admit Diagnosis: Acute pulmonary embolism ([address]      Problem List:      1. Acute pulmonary embolism  2. Equivocal troponin elevation due to PE  3. Normal nuclear stress in Oct 2016; EF 71%  4. Paroxysmal atrial fibrillation  5. Hypertension  6. Asthma  7. COPD  8. Fibromyalgia  9. Anxiety  10. Depression  11. 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 Usual cardiologist: Dr. Winter Sanchez     Assessment/Plan:      1. Continue AC  2. RV normal fxn on echo  3. LV normal fxn on echo  4. Follow up with Dr. Winter Sanchez  5. Will sign off         []       High complexity decision making was performed in this patient at high risk for decompensation with multiple organ involvement. Subjective:     Carlos Alberto Nelson denies chest pain, dyspnea. Doing well. Discussed with RN events overnight. Review of Systems:    Symptom Y/N Comments  Symptom Y/N Comments   Fever/Chills N   Chest Pain N    Poor Appetite N   Edema N    Cough N   Abdominal Pain N    Sputum N   Joint Pain N    SOB/TURNER N   Pruritis/Rash N    Nausea/vomit N   Tolerating PT/OT Y    Diarrhea N   Tolerating Diet Y    Constipation N   Other       Could NOT obtain due to:      Objective:      Physical Exam:    Last 24hrs VS reviewed since prior progress note. Most recent are:    Visit Vitals    /63 (BP 1 Location: Right arm, BP Patient Position: At rest)    Pulse 88    Temp 97.9 °F (36.6 °C)    Resp 18    Ht 5' 6\" (1.676 m)    Wt 117.3 kg (258 lb 9.6 oz)    SpO2 93%    BMI 41.74 kg/m2       Intake/Output Summary (Last 24 hours) at 03/07/17 1609  Last data filed at 03/07/17 0348   Gross per 24 hour   Intake              640 ml   Output                0 ml   Net              640 ml        General Appearance: Well developed, well nourished, alert & oriented x 3,    no acute distress. Ears/Nose/Mouth/Throat: Hearing grossly normal.  Neck: Supple.   Chest: Lungs clear to auscultation bilaterally. Cardiovascular: Regular rate and rhythm, S1S2 normal, no murmur. Abdomen: Soft, non-tender, bowel sounds are active. Extremities: No edema bilaterally. Skin: Warm and dry. []         Post-cath site without hematoma, bruit, tenderness, or thrill. Distal pulses intact. PMH/SH reviewed - no change compared to H&P    Data Review    Telemetry: sinus rhythm     EKG:   []  No new EKG for review    Lab Data Personally Reviewed:    Recent Labs      03/05/17 0232   WBC  6.0   HGB  13.3   HCT  41.3   PLT  211     Recent Labs      03/07/17 0452 03/06/17   0306  03/05/17 0232   INR  1.3*  1.1  1.1   PTP  12.8*  10.9  10.8      Recent Labs      03/07/17 0452 03/05/17 0232   NA  144  143   K  4.3  4.5   CL  111*  111*   CO2  25  22   BUN  16  9   CREA  0.75  0.78   GLU  78  123*   CA  8.2*  8.5   MG   --   2.2     Recent Labs      03/05/17 0232 03/04/17 2015 03/04/17   1810   CPK   --    --   49   CKNDX   --    --   6.1*   TROIQ  0.20*  0.34*  0.34*     Lab Results   Component Value Date/Time    Cholesterol, total 232 01/19/2017 11:53 AM    HDL Cholesterol 79 01/19/2017 11:53 AM    LDL, calculated 136 01/19/2017 11:53 AM    Triglyceride 83 01/19/2017 11:53 AM       Recent Labs      03/05/17 0232   SGOT  26   AP  81   TP  6.3*   ALB  3.0*   GLOB  3.3     No results for input(s): PH, PCO2, PO2 in the last 72 hours.     Medications Personally Reviewed:    Current Facility-Administered Medications   Medication Dose Route Frequency    warfarin (COUMADIN) tablet 5 mg  5 mg Oral ONCE    phenazopyridine (PYRIDIUM) tablet 200 mg  200 mg Oral TID PRN    gabapentin (NEURONTIN) capsule 100 mg  100 mg Oral TID    celecoxib (CELEBREX) capsule 200 mg  200 mg Oral BID    ALPRAZolam (XANAX) tablet 0.5 mg  0.5 mg Oral QHS PRN    cyclobenzaprine (FLEXERIL) tablet 10 mg  10 mg Oral TID PRN    dilTIAZem CD (CARDIZEM CD) capsule 120 mg  120 mg Oral DAILY    fluticasone-vilanterol (BREO ELLIPTA) 200mcg-25mcg/puff  1 Puff Inhalation DAILY    sucralfate (CARAFATE) 100 mg/mL oral suspension 0.5 g  0.5 g Oral QID    venlafaxine-SR (EFFEXOR-XR) capsule 150 mg  150 mg Oral DAILY    zolpidem (AMBIEN) tablet 5 mg  5 mg Oral QHS PRN    enoxaparin (LOVENOX) injection 120 mg  1 mg/kg SubCUTAneous Q12H    albuterol-ipratropium (DUO-NEB) 2.5 MG-0.5 MG/3 ML  3 mL Nebulization Q6H PRN    labetalol (NORMODYNE;TRANDATE) injection 10 mg  10 mg IntraVENous Q6H PRN    acetaminophen (TYLENOL) tablet 650 mg  650 mg Oral Q4H PRN    oxyCODONE-acetaminophen (PERCOCET) 5-325 mg per tablet 1 Tab  1 Tab Oral Q4H PRN    morphine injection 2 mg  2 mg IntraVENous Q4H PRN    ondansetron (ZOFRAN) injection 4 mg  4 mg IntraVENous Q6H PRN    pantoprazole (PROTONIX) tablet 40 mg  40 mg Oral DAILY    nortriptyline (PAMELOR) capsule 25 mg  25 mg Oral QHS         Franklin Kyle III, DO

## 2017-03-07 NOTE — PROGRESS NOTES
[address] SHIFT NURSING NOTE    Bedside shift change report given to KEIηνίτσα Andrew (oncoming nurse) by Ethan Holm (offgoing nurse). Report included the following information SBAR, Kardex, Intake/Output, MAR and Recent Results.     SHIFT SUMMARY:         Admission Date 3/4/2017   Admission Diagnosis Acute pulmonary embolism ([address]   Consults IP CONSULT TO CARDIOLOGY        Consults   [x] PT   [] OT   [] Speech   [] Palliative      [] Hospice    [x] Case Management   [] None   Cardiac Monitoring   [x] Yes   [] No     Antibiotics   [] Yes   [x] No   GI Prophylaxis  (Ex: Protonix, Pepcid, etc,.)   [x] Yes   [] No          DVT Prophylaxis   SCDs:             Niall stockings:         [x] Medication (Ex: Lovenox, Eliquis, Brilinta, Coumadin,  Heparin, etc..)   [] Contraindicated   [] No VTE needed       Urinary Catheter             LDAs               Peripheral IV 03/04/17 Left Wrist (Active)   Site Assessment Clean, dry, & intact 3/6/2017  8:26 PM   Phlebitis Assessment 0 3/6/2017  8:26 PM   Infiltration Assessment 0 3/6/2017  8:26 PM   Dressing Status Clean, dry, & intact 3/6/2017  8:26 PM   Dressing Type Transparent 3/6/2017  8:26 PM   Hub Color/Line Status Pink;Capped 3/6/2017  8:26 PM   Alcohol Cap Used No 3/6/2017  3:32 PM       Peripheral IV 03/04/17 Left Antecubital (Active)   Site Assessment Clean, dry, & intact 3/6/2017  8:26 PM   Phlebitis Assessment 0 3/6/2017  8:26 PM   Infiltration Assessment 0 3/6/2017  8:26 PM   Dressing Status Clean, dry, & intact 3/6/2017  8:26 PM   Dressing Type Transparent 3/6/2017  8:26 PM   Hub Color/Line Status Pink;Capped 3/6/2017  8:26 PM   Alcohol Cap Used No 3/6/2017  3:32 PM                      I/Os   Intake/Output Summary (Last 24 hours) at 03/06/17 2029  Last data filed at 03/06/17 2026   Gross per 24 hour   Intake              740 ml   Output                0 ml   Net              740 ml         Activity Level Activity Level: Up ad rocio     Activity Assistance: No assistance needed   Diet Active Orders   Diet    DIET CARDIAC Regular      Purposeful Rounding every 1-2 hour? [x] Yes    Ke Score  Total Score: 1   Bed Alarm (If score 3 or >)   [] Yes    [] Refused (See signed refusal form in chart)   Alonso Score  Alonso Score: 21       Alonso Score (if score 14 or less)   [] PMT consult   [] Nutrition consult   [] Wound Care consult      []  Specialty bed         Influenza Vaccine Received Flu Vaccine for Current Season (usually Sept-March): Yes               Needs prior to discharge:   Home O2 required:    [] Yes   [x] No     If yes, how much O2 required?     Other:    Last Bowel Movement Date: 03/04/17   Readmission Risk Assessment Tool Score Low Risk            9       Total Score        3 Relationship with PCP    4 More than 1 Admission in calendar year    2 Charlson Comorbidity Score        Criteria that do not apply:    Patient Living Status    Patient Length of Stay > 5    Patient Insurance is Medicare, Medicaid or Self Pay       Expected Length of Stay 3d 2h   Actual Length of Stay 2

## 2017-03-07 NOTE — PROGRESS NOTES
[address] SHIFT NURSING NOTE    Bedside shift change report given to Juan Reed (oncoming nurse) by Maria Isabel Zapata (offgoing nurse). Report included the following information SBAR, Kardex, Procedure Summary, Intake/Output, MAR and Recent Results.     SHIFT SUMMARY:         Admission Date 3/4/2017   Admission Diagnosis Acute pulmonary embolism ([address]   Consults IP CONSULT TO CARDIOLOGY        Consults   [x] PT   [x] OT   [] Speech   [] Palliative      [] Hospice    [x] Case Management   [] None   Cardiac Monitoring   [x] Yes   [] No     Antibiotics   [] Yes   [x] No   GI Prophylaxis  (Ex: Protonix, Pepcid, etc,.)   [x] Yes   [] No          DVT Prophylaxis   SCDs:             Niall stockings:         [x] Medication (Ex: Lovenox, Eliquis, Brilinta, Coumadin,  Heparin, etc..)   [] Contraindicated   [] No VTE needed       Urinary Catheter             LDAs               Peripheral IV 03/04/17 Left Wrist (Active)   Site Assessment Clean, dry, & intact 3/6/2017  3:32 PM   Phlebitis Assessment 0 3/6/2017  3:32 PM   Infiltration Assessment 0 3/6/2017  3:32 PM   Dressing Status Clean, dry, & intact 3/6/2017  3:32 PM   Dressing Type Transparent;Tape 3/6/2017  3:32 PM   Hub Color/Line Status Pink;Patent 3/6/2017  3:32 PM   Alcohol Cap Used No 3/6/2017  3:32 PM       Peripheral IV 03/04/17 Left Antecubital (Active)   Site Assessment Clean, dry, & intact 3/6/2017  3:32 PM   Phlebitis Assessment 0 3/6/2017  3:32 PM   Infiltration Assessment 0 3/6/2017  3:32 PM   Dressing Status Clean, dry, & intact 3/6/2017  3:32 PM   Dressing Type Transparent;Tape 3/6/2017  3:32 PM   Hub Color/Line Status Pink;Capped 3/6/2017  3:32 PM   Alcohol Cap Used No 3/6/2017  3:32 PM                      I/Os   Intake/Output Summary (Last 24 hours) at 03/06/17 1944  Last data filed at 03/06/17 0324   Gross per 24 hour   Intake              500 ml   Output                0 ml   Net              500 ml         Activity Level Activity Level: Up ad rocio     Activity Assistance: No assistance needed   Diet Active Orders   Diet    DIET CARDIAC Regular      Purposeful Rounding every 1-2 hour? [x] Yes    Ke Score  Total Score: 1   Bed Alarm (If score 3 or >)   [] Yes    [] Refused (See signed refusal form in chart)   Alonso Score  Alonso Score: 21       Alonso Score (if score 14 or less)   [] PMT consult   [] Nutrition consult   [] Wound Care consult      []  Specialty bed         Influenza Vaccine Received Flu Vaccine for Current Season (usually Sept-March): Yes               Needs prior to discharge:   Home O2 required:    [] Yes   [] No     If yes, how much O2 required?     Other:    Last Bowel Movement Date: 03/04/17   Readmission Risk Assessment Tool Score Low Risk            9       Total Score        3 Relationship with PCP    4 More than 1 Admission in calendar year    2 Charlson Comorbidity Score        Criteria that do not apply:    Patient Living Status    Patient Length of Stay > 5    Patient Insurance is Medicare, Medicaid or Self Pay       Expected Length of Stay 3d 2h   Actual Length of Stay 2

## 2017-03-07 NOTE — PROGRESS NOTES
Hospitalist Progress Note    NAME: Tootie Major   :  1960   MRN:  [id number]       Interim Hospital Summary: 61 y.o. female whom presented on 3/4/2017 with      Assessment / Plan:  Acute Pulmonary Embolism POA with hypoxemia   - CTA of chest: Right pulmonary artery PE, with extension into the interlobar, right lower lobe, and right upper lobe pulmonary arteries. Smaller segmental and  subsegmental pulmonary emboli in the bilateral lungs. Right heart strain.   - Hx of paroxysmal a-fib, was following by Dr. Aristides Chi about 8 years ago, but did not kept up with follow up. 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 She states that she will not have health insurance coverage after the end of this month. - transitioning lovenox to coumadin. INR 1.3.  DC planning for tomorrow with lovenox and hopefully no need for home oxygen. Will try wean off oxygen today     Troponin elevation, mild  PAF  - secondary to right heart strain, troponin trending down to 0.20  - Echo EF 60%, no MR, no AS. RIGHT VENTRICLE: The size was normal. Systolic function was normal. Wall thickness was normal        HTN  - c/w diltiazem, use labetalol prn.     Asthma/COPD  - not wheezing at this time, will c/w Duonebs prn.  - Continue Breo (takes dulera 200/5 at home)  - Has upcoming appt with Pulmonologist on 2017     Anxiety/Depression  - continue Effexor XR 150mg daily, Xanax 0.5mg as need     Fibromyalgia  - recommend stopping celebrex (home medication) while on coumadin. Can continue on  flexeril and percocet prn at home. Patient expressed that Neurontin has worked to treat her neuropathic pain. But, she stop filling the medication. She would like to restart on Neurontin.   - Started on Neurontin 100mg TID    Urinary urgency/ spasm (hx of chronic cystitis; follows by Dr. Acosta Holt, )  - repeat U/A done which is (-), started on pyridium    Morbid obesity  -  BMI of 41.74, height of 5'6\" and a weight of 258 lbs. GERD: c/w PPI  Code Status: Full Code  Surrogate Decision Maker: son Asad Alas   DVT Prophylaxis: Lovenox/coumadin  GI Prophylaxis: on PPI  Baseline: Independent full ADL     Recommended Disposition: Home with Home health nurse       Subjective:     Chief Complaint / Reason for Physician Visit  Follow up of PE, hypoxemia,   Breathing better. Still on oxygen but 98%    Review of Systems:  Symptom Y/N Comments  Symptom Y/N Comments   Fever/Chills n   Chest Pain n    Poor Appetite    Edema     Cough n   Abdominal Pain     Sputum n   Joint Pain     SOB/TURNER y   Pruritis/Rash     Nausea/vomit n   Tolerating PT/OT     Diarrhea n   Tolerating Diet     Constipation n   Other       Could NOT obtain due to:      Objective:     VITALS:   Last 24hrs VS reviewed since prior progress note. Most recent are:  Patient Vitals for the past 24 hrs:   Temp Pulse Resp BP SpO2   03/07/17 0759 97.5 °F (36.4 °C) 72 18 113/52 98 %   03/07/17 0347 97.8 °F (36.6 °C) 71 18 117/48 99 %   03/06/17 2332 97.8 °F (36.6 °C) 72 18 128/70 97 %   03/06/17 1919 97.5 °F (36.4 °C) 74 18 142/80 95 %   03/06/17 1553 97.5 °F (36.4 °C) 74 18 127/72 95 %       Intake/Output Summary (Last 24 hours) at 03/07/17 1022  Last data filed at 03/07/17 0348   Gross per 24 hour   Intake              640 ml   Output                0 ml   Net              640 ml        PHYSICAL EXAM:  General: WD, WN. Alert, cooperative, no acute distress    EENT:  EOMI. Anicteric sclerae. MMM  Resp:  CTA bilaterally, no wheezing or rales. No accessory muscle use  CV:  Regular  rhythm,  No edema  GI:  Soft, obese, Non tender.  +Bowel sounds  Neurologic:  Alert and oriented X 3, normal speech,   Psych:   Good insight. Not anxious nor agitated  Skin:  No rashes.   No jaundice    Reviewed most current lab test results and cultures  YES  Reviewed most current radiology test results   YES  Review and summation of old records today    NO  Reviewed patient's current orders and MAR    YES  PMH/SH reviewed - no change compared to H&P  ________________________________________________________________________  Care Plan discussed with:    Comments   Patient y    Family      RN     Care Manager     Consultant                        Multidiciplinary team rounds were held today with , nursing, pharmacist and clinical coordinator. Patient's plan of care was discussed; medications were reviewed and discharge planning was addressed. ________________________________________________________________________  Total NON critical care TIME:  25  Minutes    Total CRITICAL CARE TIME Spent:   Minutes non procedure based      Comments   >50% of visit spent in counseling and coordination of care     ________________________________________________________________________  Ronen Huang MD     Procedures: see electronic medical records for all procedures/Xrays and details which were not copied into this note but were reviewed prior to creation of Plan. LABS:  I reviewed today's most current labs and imaging studies.   Pertinent labs include:  Recent Labs      03/05/17   0232 03/04/17   1546   WBC  6.0  8.3   HGB  13.3  14.0   HCT  41.3  44.3   PLT  211  209     Recent Labs      03/07/17   0452  03/06/17   0306  03/05/17   0232  03/04/17   1546   NA  144   --   143  143   K  4.3   --   4.5  4.2   CL  111*   --   111*  108   CO2  25   --   22  27   GLU  78   --   123*  98   BUN  16   --   9  9   CREA  0.75   --   0.78  0.96   CA  8.2*   --   8.5  8.9   MG   --    --   2.2   --    ALB   --    --   3.0*  3.2*   TBILI   --    --   0.4  0.4   SGOT   --    --   26  17   ALT   --    --   19  16   INR  1.3*  1.1  1.1   --        Signed: )Ronen Huang MD

## 2017-03-08 VITALS
WEIGHT: 258.6 LBS | RESPIRATION RATE: 18 BRPM | TEMPERATURE: 98.1 F | HEART RATE: 82 BPM | HEIGHT: 66 IN | OXYGEN SATURATION: 95 % | BODY MASS INDEX: 41.56 KG/M2 | SYSTOLIC BLOOD PRESSURE: 140 MMHG | DIASTOLIC BLOOD PRESSURE: 83 MMHG

## 2017-03-08 LAB
COMMENT, 511217: NORMAL
F5 GENE MUT ANL BLD/T: NORMAL
INR PPP: 1.2 (ref 0.9–1.1)
PROTHROMBIN TIME: 12.4 SEC (ref 9–11.1)

## 2017-03-08 PROCEDURE — 74011250637 HC RX REV CODE- 250/637: Performed by: NURSE PRACTITIONER

## 2017-03-08 PROCEDURE — 74011250637 HC RX REV CODE- 250/637: Performed by: INTERNAL MEDICINE

## 2017-03-08 PROCEDURE — 85610 PROTHROMBIN TIME: CPT | Performed by: INTERNAL MEDICINE

## 2017-03-08 PROCEDURE — 74011250636 HC RX REV CODE- 250/636: Performed by: INTERNAL MEDICINE

## 2017-03-08 RX ORDER — WARFARIN SODIUM 5 MG/1
10 TABLET ORAL ONCE
Status: COMPLETED | OUTPATIENT
Start: 2017-03-08 | End: 2017-03-08

## 2017-03-08 RX ORDER — WARFARIN SODIUM 5 MG/1
TABLET ORAL
Qty: 60 TAB | Refills: 0 | Status: SHIPPED | OUTPATIENT
Start: 2017-03-08 | End: 2017-03-13 | Stop reason: SDUPTHER

## 2017-03-08 RX ORDER — ENOXAPARIN SODIUM 150 MG/ML
120 INJECTION SUBCUTANEOUS EVERY 12 HOURS
Qty: 14 SYRINGE | Refills: 0 | Status: SHIPPED | OUTPATIENT
Start: 2017-03-08 | End: 2017-03-15

## 2017-03-08 RX ADMIN — WARFARIN SODIUM 10 MG: 5 TABLET ORAL at 11:29

## 2017-03-08 RX ADMIN — GABAPENTIN 100 MG: 100 CAPSULE ORAL at 08:59

## 2017-03-08 RX ADMIN — CELECOXIB 200 MG: 200 CAPSULE ORAL at 09:00

## 2017-03-08 RX ADMIN — FLUTICASONE FUROATE AND VILANTEROL TRIFENATATE 1 PUFF: 200; 25 POWDER RESPIRATORY (INHALATION) at 11:29

## 2017-03-08 RX ADMIN — ENOXAPARIN SODIUM 120 MG: 60 INJECTION SUBCUTANEOUS at 08:59

## 2017-03-08 NOTE — PROGRESS NOTES
Pt is discharging today, no longer has need for home O2. Pt has been given information on the [facility] and she plans to walk in during hours of 4-1-17. Pt has a Rx for Lovenox; I have phoned [facility] and faxed the Rx to them pt has a $10.00 co pay and they have the medication in stock. Pt was seen by [facility] and given an application for the [facility] Care Card. LOW is also going to assist pt with a disability application. Pt's follow up appointment with Harinder Lara has been made and placed on AVS.  Pt will need PT/INR checked  Friday and MD will provide a RX for this. Pt's brother will be here to transport her home today. Care Management Interventions  PCP Verified by CM: Yes Sole Lyman MD at [facility])  Mode of Transport at Discharge:  Other (see comment) (brother to transport.)  Transition of Care Consult (CM Consult): Discharge Planning  Discharge Durable Medical Equipment: No (uses a cane)  Physical Therapy Consult: Yes  Occupational Therapy Consult: Yes  Speech Therapy Consult: No  Current Support Network: Own Home, Lives Alone  Confirm Follow Up Transport: Self  Plan discussed with Pt/Family/Caregiver: Yes  Discharge Location  Discharge Placement: [address] [facility], [address] [place] [address]

## 2017-03-08 NOTE — PROGRESS NOTES
0720    Report received from Malcom Mckenzie, [facility]. SBAR, Kardex, ED Summary, Procedure Summary, Intake/Output, MAR, Accordion, Recent Results, Med Rec Status and Cardiac Rhythm NSR to ST were discussed. [facility]  Discharged patient. Reviewed all discharge instructions with patient including new and current medications as well as follow-up appointments. Patient verbalized understanding. IV and Tele removed. Patient escorted to vehicle with all belongings.

## 2017-03-08 NOTE — DISCHARGE INSTRUCTIONS
HOSPITALIST DISCHARGE INSTRUCTIONS    NAME: Usha Pittman   :  1960   MRN:  [id number]     Date/Time:  3/8/2017 1:16 PM    ADMIT DATE: 3/4/2017   DISCHARGE DATE: 3/8/2017         · It is important that you take the medication exactly as they are prescribed. · Keep your medication in the bottles provided by the pharmacist and keep a list of the medication names, dosages, and times to be taken in your wallet. · Do not take other medications without consulting your doctor. What to do at [address]:  Cardiac Diet    Recommended activity: Activity as tolerated      If you have questions regarding the hospital related prescriptions or hospital related issues please call [facility] at . You can always direct your questions to your primary care doctor if you are unable to reach your hospital physician; your PCP works as an extension of your hospital doctor just like your hospital doctor is an extension of your PCP for your time at the hospital [facility], [facility])    If you experience any of the following symptoms then please call your primary care physician or return to the emergency room if you cannot get hold of your doctor:    Fever, chills, nausea, vomiting, or persistent diarrhea  Worsening weakness or new problems with your speech or balance  Dark stools or visible blood in your stools  New Leg swelling or shortness of breath as this could be signs of a clot    Additional Instructions: Take warfarin 7.5mg on Thursday  PT INR this Friday (March 10) and call your doctor for further instructions     Talk to your PCP about a referral to see a Hematologist for your positive lupus anticoagulant        Information obtained by :  I understand that if any problems occur once I am at home I am to contact my physician. I understand and acknowledge receipt of the instructions indicated above. Physician's or R.N.'s Signature                                                                  Date/Time                                                                                                                                              Patient or Representative Signature

## 2017-03-08 NOTE — PROGRESS NOTES
Pharmacy Dosing of Warfarin  Pharmacy consulted to dose warfarin for this 61 y.o. female ordered warfarin for Acute PE     Goal INR: 2-3     Date    INR    Dose       1.1    10 mg        1.1   7.5 mg   3/7     Home Regimen:  Warfarin Naive     Concurrent anticoagulants: Lovenox 1 mg/kg (120mg) Q12H   Major Interacting Medications: none  Platelet Inhibitors: none  Estimated Creatinine Clearance: 107.8 mL/min (based on Cr of 0.75). Estimated Creatinine Clearance (using IBW): 77.5 mL/min  Recent Labs      17   0211  17   0452  17   0306   CREA   --   0.75   --    BUN   --   16   --    NA   --   144   --    K   --   4.3   --    CA   --   8.2*   --    INR  1.2*  1.3*  1.1     Temp (24hrs), Av.6 °F (36.4 °C), Min:97.2 °F (36.2 °C), Max:98 °F (36.7 °C)    Child Jimenez Score, if applicable: n/a  Warfarin Sensitivity Factors Present: none  Warfarin Sensitivity:  low/moderate     Impression/Plan:   Average daily dose 7.5mg last three days for new Warfarin dosing regimen. Pt appears to demonstrate lower than average Warfarin sensitivity based on recent INR response. Will provide a once booster dose today and hope to use average daily dosing beginning 3/9. Warfarin 10mg this am and no additional dose 3/8 18:00 . If discharged today, consider providing prescription for 5mg tabs and ask pt to take 7.5mg (1 1/2 tabs) 3/9 then get INR rechecked on Friday 3/10. I suspect pt will settle into a regimen of 7.5mg daily except 2-3 days a week 5mg. Also recommend continue Lovenox bridge therapy until INR therapeutic x 2 days. I have discussed this plan will attending MD Dr. Jaya Francois and he agrees. Pharmacy will follow daily and adjust the dose as appropriate.     Thank you,  Ophelia Amin, [facility]

## 2017-03-08 NOTE — PROGRESS NOTES
0720      Report received from Tariq Eckert, [facility]. SBAR, Kardex, ED Summary, OR Summary, Procedure Summary, Intake/Output, MAR, Accordion, Recent Results, Med Rec Status and Cardiac Rhythm NSR to ST were discussed. Altagracia Victoria         [facility] SHIFT NURSING NOTE    Bedside shift change report given to Tariq Eckert (oncoming nurse) by Juan Pablo Ramires (offgoing nurse).  Report included the following information SBAR, Kardex, ED Summary, Procedure Summary, Intake/Output, MAR, Accordion, Recent Results, Med Rec Status and Cardiac Rhythm NSR to ST.    SHIFT SUMMARY:         Admission Date 3/4/2017   Admission Diagnosis Acute pulmonary embolism ([facility] [address]   Consults IP CONSULT TO CARDIOLOGY        Consults   [] PT   [] OT   [] Speech   [] Palliative      [] Hospice    [x] Case Management   [] None   Cardiac Monitoring   [x] Yes   [] No     Antibiotics   [] Yes   [x] No   GI Prophylaxis  (Ex: Protonix, Pepcid, etc,.)   [] Yes   [x] No          DVT Prophylaxis   SCDs:             Niall stockings:         [x] Medication (Ex: Lovenox, Eliquis, Brilinta, Coumadin,  Heparin, etc..)   [] Contraindicated   [] No VTE needed       Urinary Catheter             LDAs               Peripheral IV 03/04/17 Left Wrist (Active)   Site Assessment Clean, dry, & intact 3/7/2017  7:33 PM   Phlebitis Assessment 0 3/7/2017  7:33 PM   Infiltration Assessment 0 3/7/2017  7:33 PM   Dressing Status Clean, dry, & intact 3/7/2017  7:33 PM   Dressing Type Transparent 3/7/2017  7:33 PM   Hub Color/Line Status Pink;Capped 3/7/2017  7:33 PM   Alcohol Cap Used No 3/6/2017  3:32 PM       Peripheral IV 03/04/17 Left Antecubital (Active)   Site Assessment Clean, dry, & intact 3/7/2017  7:33 PM   Phlebitis Assessment 0 3/7/2017  7:33 PM   Infiltration Assessment 0 3/7/2017  7:33 PM   Dressing Status Clean, dry, & intact 3/7/2017  7:33 PM   Dressing Type Transparent 3/7/2017  7:33 PM   Hub Color/Line Status Pink;Capped 3/7/2017  7:33 PM   Alcohol Cap Used No 3/6/2017  3:32 PM I/Os   Intake/Output Summary (Last 24 hours) at 03/07/17 1951  Last data filed at 03/07/17 1933   Gross per 24 hour   Intake             1240 ml   Output                0 ml   Net             1240 ml         Activity Level Activity Level: Up ad rocio     Activity Assistance: No assistance needed   Diet Active Orders   Diet    DIET CARDIAC Regular      Purposeful Rounding every 1-2 hour? [x] Yes    Ke Score  Total Score: 1   Bed Alarm (If score 3 or >)   [] Yes    [] Refused (See signed refusal form in chart)   Alonso Score  Alonso Score: 21       Alonso Score (if score 14 or less)   [] PMT consult   [] Nutrition consult   [] Wound Care consult      []  Specialty bed         Influenza Vaccine Received Flu Vaccine for Current Season (usually Sept-March): Yes               Needs prior to discharge:   Home O2 required:    [] Yes   [] No     If yes, how much O2 required?     Other:    Last Bowel Movement Date: 03/04/17   Readmission Risk Assessment Tool Score Low Risk            9       Total Score        3 Relationship with PCP    4 More than 1 Admission in calendar year    2 Charlson Comorbidity Score        Criteria that do not apply:    Patient Living Status    Patient Length of Stay > 5    Patient Insurance is Medicare, Medicaid or Self Pay       Expected Length of Stay 3d 2h   Actual Length of Stay 3

## 2017-03-08 NOTE — PROGRESS NOTES
[address] SHIFT NURSING NOTE    Bedside shift change report given to Χηνίτσα Andrew (oncoming nurse) by Shayla Laura (offgoing nurse). Report included the following information SBAR, Kardex, Intake/Output, MAR and Recent Results.     SHIFT SUMMARY:         Admission Date 3/4/2017   Admission Diagnosis Acute pulmonary embolism ([address]   Consults IP CONSULT TO CARDIOLOGY        Consults   [x] PT   [] OT   [] Speech   [] Palliative      [] Hospice    [x] Case Management   [] None   Cardiac Monitoring   [x] Yes   [] No     Antibiotics   [] Yes   [x] No   GI Prophylaxis  (Ex: Protonix, Pepcid, etc,.)   [x] Yes   [] No          DVT Prophylaxis   SCDs:             Niall stockings:         [x] Medication (Ex: Lovenox, Eliquis, Brilinta, Coumadin,  Heparin, etc..)   [] Contraindicated   [] No VTE needed       Urinary Catheter             LDAs               Peripheral IV 03/04/17 Left Wrist (Active)   Site Assessment Clean, dry, & intact 3/7/2017  7:33 PM   Phlebitis Assessment 0 3/7/2017  7:33 PM   Infiltration Assessment 0 3/7/2017  7:33 PM   Dressing Status Clean, dry, & intact 3/7/2017  7:33 PM   Dressing Type Transparent 3/7/2017  7:33 PM   Hub Color/Line Status Pink;Capped 3/7/2017  7:33 PM   Alcohol Cap Used No 3/6/2017  3:32 PM       Peripheral IV 03/04/17 Left Antecubital (Active)   Site Assessment Clean, dry, & intact 3/7/2017  7:33 PM   Phlebitis Assessment 0 3/7/2017  7:33 PM   Infiltration Assessment 0 3/7/2017  7:33 PM   Dressing Status Clean, dry, & intact 3/7/2017  7:33 PM   Dressing Type Transparent 3/7/2017  7:33 PM   Hub Color/Line Status Pink;Capped 3/7/2017  7:33 PM   Alcohol Cap Used No 3/6/2017  3:32 PM                      I/Os   Intake/Output Summary (Last 24 hours) at 03/07/17 1935  Last data filed at 03/07/17 1933   Gross per 24 hour   Intake             1240 ml   Output                0 ml   Net             1240 ml         Activity Level Activity Level: Up ad rocio     Activity Assistance: No assistance needed   Diet Active Orders   Diet    DIET CARDIAC Regular      Purposeful Rounding every 1-2 hour? [x] Yes    Ke Score  Total Score: 1   Bed Alarm (If score 3 or >)   [] Yes    [] Refused (See signed refusal form in chart)   Alonso Score  Alonso Score: 21       Alonso Score (if score 14 or less)   [] PMT consult   [] Nutrition consult   [] Wound Care consult      []  Specialty bed         Influenza Vaccine Received Flu Vaccine for Current Season (usually Sept-March): Yes               Needs prior to discharge:   Home O2 required:    [] Yes   [x] No     If yes, how much O2 required?     Other:    Last Bowel Movement Date: 03/04/17   Readmission Risk Assessment Tool Score Low Risk            9       Total Score        3 Relationship with PCP    4 More than 1 Admission in calendar year    2 Charlson Comorbidity Score        Criteria that do not apply:    Patient Living Status    Patient Length of Stay > 5    Patient Insurance is Medicare, Medicaid or Self Pay       Expected Length of Stay 3d 2h   Actual Length of Stay 3

## 2017-03-13 ENCOUNTER — OFFICE VISIT (OUTPATIENT)
Dept: FAMILY MEDICINE CLINIC | Age: 57
End: 2017-03-13

## 2017-03-13 VITALS
WEIGHT: 260.6 LBS | RESPIRATION RATE: 16 BRPM | HEART RATE: 92 BPM | SYSTOLIC BLOOD PRESSURE: 118 MMHG | OXYGEN SATURATION: 97 % | BODY MASS INDEX: 42.06 KG/M2 | DIASTOLIC BLOOD PRESSURE: 84 MMHG

## 2017-03-13 DIAGNOSIS — M25.551 RIGHT HIP PAIN: ICD-10-CM

## 2017-03-13 DIAGNOSIS — F41.9 ANXIETY AND DEPRESSION: ICD-10-CM

## 2017-03-13 DIAGNOSIS — J44.9 CHRONIC OBSTRUCTIVE PULMONARY DISEASE, UNSPECIFIED COPD TYPE (HCC): ICD-10-CM

## 2017-03-13 DIAGNOSIS — M54.40 CHRONIC LOW BACK PAIN WITH SCIATICA, SCIATICA LATERALITY UNSPECIFIED, UNSPECIFIED BACK PAIN LATERALITY: ICD-10-CM

## 2017-03-13 DIAGNOSIS — I26.09 OTHER ACUTE PULMONARY EMBOLISM WITH ACUTE COR PULMONALE (HCC): Primary | ICD-10-CM

## 2017-03-13 DIAGNOSIS — K21.9 GASTROESOPHAGEAL REFLUX DISEASE WITHOUT ESOPHAGITIS: ICD-10-CM

## 2017-03-13 DIAGNOSIS — G45.9 TRANSIENT CEREBRAL ISCHEMIA, UNSPECIFIED TYPE: ICD-10-CM

## 2017-03-13 DIAGNOSIS — E55.9 VITAMIN D DEFICIENCY: ICD-10-CM

## 2017-03-13 DIAGNOSIS — M25.551 ARTHRALGIA OF RIGHT HIP: ICD-10-CM

## 2017-03-13 DIAGNOSIS — F32.A ANXIETY AND DEPRESSION: ICD-10-CM

## 2017-03-13 DIAGNOSIS — R10.13 DYSPEPSIA: ICD-10-CM

## 2017-03-13 DIAGNOSIS — G89.29 CHRONIC LOW BACK PAIN WITH SCIATICA, SCIATICA LATERALITY UNSPECIFIED, UNSPECIFIED BACK PAIN LATERALITY: ICD-10-CM

## 2017-03-13 DIAGNOSIS — R03.0 ELEVATED BLOOD PRESSURE (NOT HYPERTENSION): ICD-10-CM

## 2017-03-13 DIAGNOSIS — E88.81 METABOLIC SYNDROME: ICD-10-CM

## 2017-03-13 DIAGNOSIS — M54.41 ACUTE RIGHT-SIDED BACK PAIN WITH SCIATICA: ICD-10-CM

## 2017-03-13 DIAGNOSIS — I49.8 OTHER CARDIAC ARRHYTHMIA: ICD-10-CM

## 2017-03-13 LAB
INR BLD: 1.6
PT POC: 19.6 SEC
VALID INTERNAL CONTROL?: YES

## 2017-03-13 RX ORDER — ESOMEPRAZOLE MAGNESIUM 40 MG/1
40 CAPSULE, DELAYED RELEASE ORAL DAILY
Qty: 90 CAP | Refills: 1 | Status: SHIPPED | OUTPATIENT
Start: 2017-03-13 | End: 2017-08-04 | Stop reason: SDUPTHER

## 2017-03-13 RX ORDER — DULOXETIN HYDROCHLORIDE 30 MG/1
90 CAPSULE, DELAYED RELEASE ORAL DAILY
Qty: 270 CAP | Refills: 1 | Status: SHIPPED | OUTPATIENT
Start: 2017-03-13 | End: 2017-05-30

## 2017-03-13 RX ORDER — WARFARIN SODIUM 5 MG/1
TABLET ORAL
Qty: 270 TAB | Refills: 1 | Status: ON HOLD | OUTPATIENT
Start: 2017-03-13 | End: 2017-03-24

## 2017-03-13 RX ORDER — HYDROCODONE BITARTRATE AND ACETAMINOPHEN 5; 325 MG/1; MG/1
1 TABLET ORAL
Qty: 120 TAB | Refills: 0 | Status: ON HOLD | OUTPATIENT
Start: 2017-03-13 | End: 2017-03-28

## 2017-03-13 RX ORDER — GABAPENTIN 300 MG/1
300 CAPSULE ORAL 2 TIMES DAILY
COMMUNITY
End: 2017-03-22 | Stop reason: SDUPTHER

## 2017-03-13 RX ORDER — ERGOCALCIFEROL 1.25 MG/1
50000 CAPSULE ORAL
Qty: 14 CAP | Refills: 1 | Status: SHIPPED | OUTPATIENT
Start: 2017-03-13

## 2017-03-13 NOTE — MR AVS SNAPSHOT
Visit Information Date & Time Provider Department Dept. Phone Encounter #  
 3/13/2017  9:15 AM Naomi Medina NP [address] [place] [address] [id number] Your Appointments 6/19/2017  3:00 PM  
ESTABLISHED PATIENT with Naomi Medina NP  
Chaya Li ([address]) Appt Note: f/u visit [address] [address] [phone]  
  
   
 [address] [address] Upcoming Health Maintenance Date Due Hepatitis C Screening 1960 COLONOSCOPY 12/4/1977 DTaP/Tdap/Td series (1 - Tdap) 12/4/1980 PAP AKA CERVICAL CYTOLOGY 12/4/1980 BREAST CANCER SCRN MAMMOGRAM 9/22/2017 Allergies as of 3/13/2017  Review Complete On: 3/13/2017 By: Naomi Medina NP Severity Noted Reaction Type Reactions Pcn [Penicillins] High 11/14/2014    Rash Griseofulvin  11/14/2014    Other (comments) Aaron-dirk syndrome Pcn [Penicillins]  03/15/2013    Rash, Swelling Tramadol  08/19/2015    Nausea Only, Drowsiness Current Immunizations  Reviewed on 9/28/2016 Name Date Influenza High Dose Vaccine PF 9/28/2016, 9/24/2015 Pneumococcal Conjugate (PCV-13) 8/26/2015 Not reviewed this visit You Were Diagnosed With   
  
 Codes Comments Other acute pulmonary embolism with acute cor pulmonale (HCC)    -  Primary ICD-10-CM: I26.09 Elevated blood pressure (not hypertension)     ICD-10-CM: R03.0 ICD-9-CM: 796.2 Metabolic syndrome     [address]: N53.25 ICD-9-CM: 277.7 Chronic obstructive pulmonary disease, unspecified COPD type ([facility]     ICD-10-CM: J44.9 ICD-9-CM: 426 Other cardiac arrhythmia     ICD-10-CM: I49.8 Gastroesophageal reflux disease without esophagitis     ICD-10-CM: K21.9 ICD-9-CM: 530.81 Arthralgia of right hip     ICD-10-CM: M25.551 ICD-9-CM: 719.45 Acute right-sided back pain with sciatica     ICD-10-CM: M54.41 
ICD-9-CM: 724.3 Right hip pain     ICD-10-CM: M25.551 ICD-9-CM: 719.45 Chronic low back pain with sciatica, sciatica laterality unspecified, unspecified back pain laterality     ICD-10-CM: M54.40, G89.29 ICD-9-CM: 724.2, 724.3, 338.29 Anxiety and depression     ICD-10-CM: F41.9, F32.9 ICD-9-CM: 300.00, 311 Vitamin D deficiency     ICD-10-CM: E55.9 ICD-9-CM: 268.9 Dyspepsia     ICD-10-CM: R10.13 ICD-9-CM: 536.8 Transient cerebral ischemia, unspecified type     ICD-10-CM: G45.9 ICD-9-CM: 435.9 Vitals BP Pulse Resp Weight(growth percentile) SpO2 BMI  
 118/84 (BP 1 Location: Left arm, BP Patient Position: Sitting) 92 16 260 lb 9.6 oz (118.2 kg) 97% 42.06 kg/m2 OB Status Smoking Status Menopause Never Smoker Vitals History BMI and BSA Data Body Mass Index Body Surface Area 42.06 kg/m 2 2.35 m 2 Preferred Pharmacy Pharmacy Name Phone  [facility] [phone] Your Updated Medication List  
  
   
This list is accurate as of: 3/13/17 12:15 PM.  Always use your most recent med list.  
  
  
  
  
 * albuterol 90 mcg/actuation inhaler Commonly known as:  PROAIR HFA Take 2 Puffs by inhalation every four (4) hours as needed for Wheezing. * albuterol 90 mcg/actuation inhaler Commonly known as:  PROAIR HFA Take 2 Puffs by inhalation every four (4) hours as needed for Wheezing. albuterol-ipratropium 2.5 mg-0.5 mg/3 ml Nebu Commonly known as:  DUO-NEB  
3 mL by Nebulization route every four (4) hours. Indications: CHRONIC OBSTRUCTIVE PULMONARY DISEASE WITH BRONCHOSPASMS ALPRAZolam 0.5 mg tablet Commonly known as:  Rebbeca Lawn Take 1 Tab by mouth nightly as needed for Anxiety. Max Daily Amount: 0.5 mg.  
  
 cyclobenzaprine 10 mg tablet Commonly known as:  FLEXERIL Take 1 Tab by mouth three (3) times daily as needed for Muscle Spasm(s). DULoxetine 30 mg capsule Commonly known as:  CYMBALTA Take 3 Caps by mouth daily. bedtime  
  
 enoxaparin 120 mg/0.8 mL injection Commonly known as:  LOVENOX  
120 mg by SubCUTAneous route every twelve (12) hours for 7 days. ergocalciferol 50,000 unit capsule Commonly known as:  ERGOCALCIFEROL Take 1 Cap by mouth every seven (7) days. esomeprazole 40 mg capsule Commonly known as:  Smith Cullens Take 1 Cap by mouth daily. gabapentin 300 mg capsule Commonly known as:  NEURONTIN Take 300 mg by mouth two (2) times a day. Indications: 1 cap BID . 1-2 bedtime PRN  
  
 inhalational spacing device 1 Each by Does Not Apply route as needed. mometasone-formoterol 200-5 mcg/actuation HFA inhaler Commonly known as:  Anaya Callow Take 2 Puffs by inhalation two (2) times a day. * NORCO 7.5-325 mg per tablet Generic drug:  HYDROcodone-acetaminophen Take 1 Tab by mouth every six (6) hours as needed for Pain. * HYDROcodone-acetaminophen 5-325 mg per tablet Commonly known as:  Consuelo Parisian Take 1 Tab by mouth every six (6) hours as needed for Pain. Max Daily Amount: 4 Tabs. nortriptyline 25 mg capsule Commonly known as:  PAMELOR  
take 1 to 2 tablets by mouth at bedtime if needed Peak Flow Meter Karina  
1 Device by Does Not Apply route daily. sucralfate 100 mg/mL suspension Commonly known as:  Ressie Flavin Take 5 mL by mouth four (4) times daily. warfarin 5 mg tablet Commonly known as:  COUMADIN Take 7.5mg daily or as instructed  Indications: Pulmonary Thromboembolism  
  
 zolpidem 5 mg tablet Commonly known as:  AMBIEN Take 1 Tab by mouth nightly as needed for Sleep. Max Daily Amount: 5 mg.  
  
 * Notice: This list has 4 medication(s) that are the same as other medications prescribed for you. Read the directions carefully, and ask your doctor or other care provider to review them with you. Prescriptions Printed Refills HYDROcodone-acetaminophen (NORCO) 5-325 mg per tablet 0 Sig: Take 1 Tab by mouth every six (6) hours as needed for Pain. Max Daily Amount: 4 Tabs. Class: Print Route: Oral  
  
Prescriptions Sent to Pharmacy Refills DULoxetine (CYMBALTA) 30 mg capsule 1 Sig: Take 3 Caps by mouth daily. bedtime Class: Normal  
 Pharmacy:  [address] Ph #: [phone] Route: Oral  
 warfarin (COUMADIN) 5 mg tablet 1 Sig: Take 7.5mg daily or as instructed  Indications: Pulmonary Thromboembolism Class: Normal  
 Pharmacy:  [address] Ph #: [phone]  
 ergocalciferol (ERGOCALCIFEROL) 50,000 unit capsule 1 Sig: Take 1 Cap by mouth every seven (7) days. Class: Normal  
 Pharmacy:  [address] Ph #: [phone] Route: Oral  
 esomeprazole (NEXIUM) 40 mg capsule 1 Sig: Take 1 Cap by mouth daily. Class: Normal  
 Pharmacy:  [address] Ph #: [phone] Route: Oral  
 mometasone-formoterol (DULERA) 200-5 mcg/actuation HFA inhaler 3 Sig: Take 2 Puffs by inhalation two (2) times a day. Class: Normal  
 Pharmacy:  [address] Ph #: [phone] Route: Inhalation We Performed the Following AMB POC PT/INR [71733 CPT(R)] COLLECTION CAPILLARY BLOOD SPECIMEN [87420 CPT(R)] DUPLEX CAROTID BILATERAL [id number] CPT(R)] To-Do List   
 03/13/2017 Imaging:  CT HEAD WO CONT   
  
 03/16/2017 9:00 AM  
  Appoint[facility] [address] ([phone]) GENERAL INSTRUCTIONS - Bring a list of all medications you are currently taking, including over the counter medications. - Only patients will be allowed in the exam room. This includes children. - Children under the age of 15 may not be left unattended.  - [address] [place] [address] patients must have an armband and be accompanied by a caregiver or family member. - If you have a hand-written prescription for this exam, you must bring it with you on the day of your exam. - Bring all relevant prior images from any facility outside of [facility] with you on the day of your exam.  Failure to provide images may delay reading by Radiologist.  If you have questions or need to reschedule or cancel your appointment, call [phone].  
  
 03/16/2017 10:00 AM  
  Appointment with [address] at [place] [address] ([phone]) GENERAL INSTRUCTIONS - If you have a hand-written prescription for this exam, you must bring it with you on the day of your exam. - Bring all relevant prior images from facilities outside of [facility]. Failure to provide images may delay reading by Radiologist. - Children under the age of 15 may not be left unattended. - [address] [place] [address] patients must have an armband and be accompanied by a caregiver or family member. APPOINTMENT CANCELLATION - To reschedule or cancel an appointment, please contact the Scheduling Department at [phone].  
  
 03/17/2017 1:00 PM  
  Appointment with Roseann Bashir PT at [address] [place] [address] ([phone]) [facility]! Dear Mau Wild: Thank you for requesting a [employer] account. Our records indicate that you already have an active [employer] account. You can access your account anytime at [email or web address] Did you know that you can access your hospital and ER discharge instructions at any time in [employer]? You can also review all of your test results from your hospital stay or ER visit. Additional Information If you have questions, please visit the Frequently Asked Questions section of the [employer] website at [email or web address] Remember, [employer] is NOT to be used for urgent needs. For medical emergencies, dial 911. Now available from your iPhone and Android! Please provide this summary of care documentation to your next provider. Your primary care clinician is listed as Yolanda Hsieh. If you have any questions after today's visit, please call [phone].

## 2017-03-17 ENCOUNTER — TELEPHONE (OUTPATIENT)
Dept: FAMILY MEDICINE CLINIC | Age: 57
End: 2017-03-17

## 2017-03-17 ENCOUNTER — HOSPITAL ENCOUNTER (EMERGENCY)
Age: 57
Discharge: HOME | End: 2017-03-17
Attending: EMERGENCY MEDICINE
Payer: COMMERCIAL

## 2017-03-17 ENCOUNTER — APPOINTMENT (OUTPATIENT)
Dept: ULTRASOUND IMAGING | Age: 57
End: 2017-03-17
Attending: PHYSICIAN ASSISTANT
Payer: COMMERCIAL

## 2017-03-17 VITALS
WEIGHT: 257.94 LBS | DIASTOLIC BLOOD PRESSURE: 86 MMHG | HEIGHT: 66 IN | OXYGEN SATURATION: 98 % | RESPIRATION RATE: 14 BRPM | BODY MASS INDEX: 41.45 KG/M2 | HEART RATE: 98 BPM | SYSTOLIC BLOOD PRESSURE: 146 MMHG | TEMPERATURE: 98.1 F

## 2017-03-17 DIAGNOSIS — I26.09 OTHER ACUTE PULMONARY EMBOLISM WITH ACUTE COR PULMONALE (HCC): ICD-10-CM

## 2017-03-17 DIAGNOSIS — M79.661 PAIN OF RIGHT CALF: Primary | ICD-10-CM

## 2017-03-17 DIAGNOSIS — Z51.81 SUBTHERAPEUTIC ANTICOAGULATION: ICD-10-CM

## 2017-03-17 DIAGNOSIS — Z79.01 SUBTHERAPEUTIC ANTICOAGULATION: ICD-10-CM

## 2017-03-17 LAB
ALBUMIN SERPL BCP-MCNC: 3.2 G/DL (ref 3.5–5)
ALBUMIN/GLOB SERPL: 1 {RATIO} (ref 1.1–2.2)
ALP SERPL-CCNC: 86 U/L (ref 45–117)
ALT SERPL-CCNC: 34 U/L (ref 12–78)
ANION GAP BLD CALC-SCNC: 6 MMOL/L (ref 5–15)
APTT PPP: 44.4 SEC (ref 22.1–32.5)
AST SERPL W P-5'-P-CCNC: 26 U/L (ref 15–37)
BASOPHILS # BLD AUTO: 0 K/UL (ref 0–0.1)
BASOPHILS # BLD: 1 % (ref 0–1)
BILIRUB SERPL-MCNC: 0.3 MG/DL (ref 0.2–1)
BUN SERPL-MCNC: 8 MG/DL (ref 6–20)
BUN/CREAT SERPL: 10 (ref 12–20)
CALCIUM SERPL-MCNC: 8.6 MG/DL (ref 8.5–10.1)
CHLORIDE SERPL-SCNC: 108 MMOL/L (ref 97–108)
CO2 SERPL-SCNC: 28 MMOL/L (ref 21–32)
CREAT SERPL-MCNC: 0.8 MG/DL (ref 0.55–1.02)
EOSINOPHIL # BLD: 0.3 K/UL (ref 0–0.4)
EOSINOPHIL NFR BLD: 5 % (ref 0–7)
ERYTHROCYTE [DISTWIDTH] IN BLOOD BY AUTOMATED COUNT: 13.9 % (ref 11.5–14.5)
GLOBULIN SER CALC-MCNC: 3.1 G/DL (ref 2–4)
GLUCOSE SERPL-MCNC: 81 MG/DL (ref 65–100)
HCT VFR BLD AUTO: 43.4 % (ref 35–47)
HGB BLD-MCNC: 13.8 G/DL (ref 11.5–16)
INR PPP: 1.5 (ref 0.9–1.1)
LYMPHOCYTES # BLD AUTO: 28 % (ref 12–49)
LYMPHOCYTES # BLD: 2.1 K/UL (ref 0.8–3.5)
MCH RBC QN AUTO: 28.6 PG (ref 26–34)
MCHC RBC AUTO-ENTMCNC: 31.8 G/DL (ref 30–36.5)
MCV RBC AUTO: 90 FL (ref 80–99)
MONOCYTES # BLD: 0.6 K/UL (ref 0–1)
MONOCYTES NFR BLD AUTO: 8 % (ref 5–13)
NEUTS SEG # BLD: 4.4 K/UL (ref 1.8–8)
NEUTS SEG NFR BLD AUTO: 58 % (ref 32–75)
PLATELET # BLD AUTO: 309 K/UL (ref 150–400)
POTASSIUM SERPL-SCNC: 4.1 MMOL/L (ref 3.5–5.1)
PROT SERPL-MCNC: 6.3 G/DL (ref 6.4–8.2)
PROTHROMBIN TIME: 15 SEC (ref 9–11.1)
RBC # BLD AUTO: 4.82 M/UL (ref 3.8–5.2)
SODIUM SERPL-SCNC: 142 MMOL/L (ref 136–145)
THERAPEUTIC RANGE,PTTT: ABNORMAL SECS (ref 58–77)
WBC # BLD AUTO: 7.4 K/UL (ref 3.6–11)

## 2017-03-17 PROCEDURE — 99282 EMERGENCY DEPT VISIT SF MDM: CPT

## 2017-03-17 PROCEDURE — 93971 EXTREMITY STUDY: CPT

## 2017-03-17 PROCEDURE — 36415 COLL VENOUS BLD VENIPUNCTURE: CPT | Performed by: PHYSICIAN ASSISTANT

## 2017-03-17 PROCEDURE — 85610 PROTHROMBIN TIME: CPT | Performed by: PHYSICIAN ASSISTANT

## 2017-03-17 PROCEDURE — 85025 COMPLETE CBC W/AUTO DIFF WBC: CPT | Performed by: PHYSICIAN ASSISTANT

## 2017-03-17 PROCEDURE — 85730 THROMBOPLASTIN TIME PARTIAL: CPT | Performed by: PHYSICIAN ASSISTANT

## 2017-03-17 PROCEDURE — 80053 COMPREHEN METABOLIC PANEL: CPT | Performed by: PHYSICIAN ASSISTANT

## 2017-03-17 RX ORDER — ENOXAPARIN SODIUM 150 MG/ML
120 INJECTION SUBCUTANEOUS EVERY 12 HOURS
Qty: 14 SYRINGE | Refills: 0 | Status: SHIPPED | OUTPATIENT
Start: 2017-03-17 | End: 2017-03-28

## 2017-03-17 NOTE — DISCHARGE INSTRUCTIONS
Leg Pain: Care Instructions  Your Care Instructions  Many things can cause leg pain. Too much exercise or overuse can cause a muscle cramp (or charley horse). You can get leg cramps from not eating a balanced diet that has enough potassium, calcium, and other minerals. If you do not drink enough fluids or are taking certain medicines, you may develop leg cramps. Other causes of leg pain include injuries, blood flow problems, nerve damage, and twisted and enlarged veins (varicose veins). You can usually ease pain with self-care. Your doctor may recommend that you rest your leg and keep it elevated. Follow-up care is a key part of your treatment and safety. Be sure to make and go to all appointments, and call your doctor if you are having problems. Its also a good idea to know your test results and keep a list of the medicines you take. How can you care for yourself at home? · Take pain medicines exactly as directed. ¨ If the doctor gave you a prescription medicine for pain, take it as prescribed. ¨ If you are not taking a prescription pain medicine, ask your doctor if you can take an over-the-counter medicine. · Take any other medicines exactly as prescribed. Call your doctor if you think you are having a problem with your medicine. · Rest your leg while you have pain, and avoid standing for long periods of time. · Prop up your leg at or above the level of your heart when possible. · Make sure you are eating a balanced diet that is rich in calcium, potassium, and magnesium, especially if you are pregnant. · If directed by your doctor, put ice or a cold pack on the area for 10 to 20 minutes at a time. Put a thin cloth between the ice and your skin. · Your leg may be in a splint, a brace, or an elastic bandage, and you may have crutches to help you walk. Follow your doctor's directions about how long to wear supports and how to use the crutches. When should you call for help?   Call 911 anytime you think you may need emergency care. For example, call if:  · You have sudden chest pain and shortness of breath, or you cough up blood. · Your leg is cool or pale or changes color. Call your doctor now or seek immediate medical care if:  · You have increasing or severe pain. · Your leg suddenly feels weak and you cannot move it. · You have signs of a blood clot, such as:  ¨ Pain in your calf, back of the knee, thigh, or groin. ¨ Redness and swelling in your leg or groin. · You have signs of infection, such as:  ¨ Increased pain, swelling, warmth, or redness. ¨ Red streaks leading from the sore area. ¨ Pus draining from a place on your leg. ¨ A fever. · You cannot bear weight on your leg. Watch closely for changes in your health, and be sure to contact your doctor if:  · You do not get better as expected. Where can you learn more? Go to http://bernardGame Cooksoctavio.info/. Enter Y416 in the search box to learn more about \"Leg Pain: Care Instructions. \"  Current as of: May 27, 2016  Content Version: 11.1  © 1564-5430 [employer]. Care instructions adapted under license by [employer] (which disclaims liability or warranty for this information). If you have questions about a medical condition or this instruction, always ask your healthcare professional. [address] any warranty or liability for your use of this information. Ã¯Â»Â¿  Anticoagulants: After Your Visit  Your Care Instructions  Your doctor prescribed an anticoagulant medicine. Anticoagulants, often called blood thinners, prevent new blood clots from forming and keep existing clots from getting larger. They do not actually thin the blood, but they make the blood take longer to clot. This lowers the risk of a blood clot moving to the lungs (pulmonary embolism) or moving to the brain and causing a stroke. Blood thinners come in two forms.  Heparin is given by shot, either under your skin or through a needle in your vein, and starts working right away. Warfarin (Coumadin) comes in pill form and takes longer to work. Your doctor may have you begin taking both forms at the same time. As soon as the pills start to work, you will stop the shots but continue to take the pills. If you have a blood clot in your leg, you may need to take warfarin for several months. People who have heart conditions such as atrial fibrillation often need to take it for the rest of their lives. The right dose of this medicine is different for each person. You will need regular blood tests to see if your dose is correct. Follow-up care is a key part of your treatment and safety. Be sure to make and go to all appointments, and call your doctor if you are having problems. Itâs also a good idea to know your test results and keep a list of the medicines you take. How do you take blood thinners? · Take your medicines exactly as prescribed. Call your doctor if you think you are having a problem with your medicine. · Call your doctor if you are not sure what to do if you missed a dose of blood thinner. ¨ Your doctor can tell you exactly what to do so you do not take too much or too little blood thinner. Then you will be as safe as possible. · Some general rules for what to do if you miss a dose:  ¨ If you remember it in the same day, take the missed dose. Then go back to your regular schedule. ¨ If it is the next day, or almost time to take the next dose, do not take the missed dose. Do not double the dose to make up for the missed one. At your next regularly scheduled time, take your normal dose. ¨ If you miss your dose for 2 or more days, call your doctor. · To help you stay on schedule, use a calendar to remind you when to take your blood thinner. When you take the medicine, note it on the calendar. · If you are going to give yourself shots, your doctor will give you instructions for how to safely inject the medicine. Follow the directions carefully. · Do not take any vitamins, over-the-counter medicines, or herbal products without talking to your doctor first.  · Avoid contact sports and other activities that could lead to injury. Make your home safe and take other measures to reduce your risk of falling. Always wear a seat belt while in a car. · Do not suddenly change your intake of vitamin Kârich foods, such as broccoli, cabbage, asparagus, lettuce, and spinach. This will help blood thinners work evenly from day to day. · Limit alcohol to 2 drinks a day for men and 1 drink a day for women. Alcohol may interfere with blood thinners. It also increases your risk of falls, which can cause bruising and bleeding. · Tell your dentist, pharmacist, and other health professionals that you take blood thinners. Wear medical alert jewelry that says you take blood thinners. You can buy this at most drugstores. When should you call for help? Call 911 anytime you think you may need emergency care. For example, call if:  · You cough up blood. · You vomit blood or what looks like coffee grounds. · You pass maroon or very bloody stools. Call your doctor now or seek immediate medical care if:  · You have new bruises or blood spots under your skin. · You have a nosebleed. · Your gums bleed when you brush your teeth. · You have blood in your urine. · Your stools are black and tarlike or have streaks of blood. · You have vaginal bleeding when you are not having your period, or heavy period bleeding. Watch closely for changes in your health, and be sure to contact your doctor if:  · You have questions about your medicine. Where can you learn more? Go to Znap[employer].be  Enter J289 in the search box to learn more about \"Anticoagulants: After Your Visit. \"   Â© 5199-6892 [employer]. Care instructions adapted under license by Chikis Vasquez (which disclaims liability or warranty for this information). This care instruction is for use with your licensed healthcare professional. If you have questions about a medical condition or this instruction, always ask your healthcare professional. [address] any warranty or liability for your use of this information.   Content Version: 1.4.12930; Last Revised: July 1, 2009

## 2017-03-17 NOTE — ED PROVIDER NOTES
HPI Comments: Jayashree Hamilton is a 61 y.o. female with PMhx significant for asthma, COPD, arrhythmia, and anemia who presents ambulatory to the ED with cc of gradually worsening aching right calf pain x 2 days. She states that her pain is exacerbated with standing and ambulating. She notes that she has not taken any interventions or medications for her pain. The pt reports a h/o PE, noting that she was admitted to the hospital on 3/4/17 and discharged on 3/8/17. She states that she was seen by her PCP on 3/13/17 with no significant issues or findings. She notes that she is currently taking coumadin 7.5 mg. The pt denies any h/o HTN or DM, and she specifically denies palpitations, fever, CP, SOB, abdominal pain, difficulty urinating, dysuria, or other acute complaints at this time. SHx: -EtOH, -tobacco, -illicit drug use    PCP: Evans Guevara NP    There are no other complaints, changes or physical findings at this time. The history is provided by the patient. No  was used.         Past Medical History:   Diagnosis Date    Anemia 3/15/2013    Arrhythmia     paroxsimal afib    Asthma 3/15/2013    Asthma     Back disorder     disc problem    Chronic obstructive pulmonary disease (HCC)     Morbid obesity ([facility] [address] 3/15/2013       Past Surgical History:   Procedure Laterality Date    HX ADENOIDECTOMY      HX APPENDECTOMY      HX GASTRIC BYPASS  1-15-98    Dr. Beverly Pringle    HX GASTRIC BYPASS      [address]      HX ORTHOPAEDIC      arm fracture    HX TONSILLECTOMY      HX TUBAL LIGATION  79    HX TUBAL LIGATION           Family History:   Problem Relation Age of Onset   Aundria Dadds Cancer Sister      lung    Other Mother      fem pop bypass    Bleeding Prob Mother     Heart Disease Mother     Cancer Father      pancreatic    Cancer Brother        Social History     Social History    Marital status:      Spouse name: N/A    Number of children: N/A    Years of education: N/A     Occupational History    Not on file. Social History Main Topics    Smoking status: Never Smoker    Smokeless tobacco: Never Used    Alcohol use Yes    Drug use: No    Sexual activity: No     Other Topics Concern    Not on file     Social History Narrative    ** Merged History Encounter **              ALLERGIES: Pcn [penicillins]; Griseofulvin; Pcn [penicillins]; and Tramadol    Review of Systems   Constitutional: Negative for fever. HENT: Negative. Respiratory: Negative for shortness of breath. Cardiovascular: Negative for chest pain and palpitations. Gastrointestinal: Negative for abdominal pain. Genitourinary: Negative for difficulty urinating and dysuria. Musculoskeletal: Positive for myalgias (right calf). Skin: Negative. All other systems reviewed and are negative. Patient Vitals for the past 12 hrs:   Temp Pulse Resp BP SpO2   03/17/17 1130 98.1 °F (36.7 °C) 98 14 146/86 98 %            Physical Exam   Constitutional: She is oriented to person, place, and time. She appears well-developed and well-nourished. No distress. Morbidly obese 60 yo  female   HENT:   Head: Normocephalic and atraumatic. Eyes: Conjunctivae are normal. Right eye exhibits no discharge. Left eye exhibits no discharge. Neck: Normal range of motion. Neck supple. Cardiovascular: Normal rate, regular rhythm, normal heart sounds and intact distal pulses. No murmur heard. Pulmonary/Chest: Effort normal and breath sounds normal. No respiratory distress. She has no wheezes. Musculoskeletal:   R LEG: No swelling, ecchymosis or deformity. TTP the calf. FROM of the knee, ankle and toes. Distal NV intact. Cap refill < 2 sec. Palpable pedal pulses. Ambulatory without difficulty. Lymphadenopathy:     She has no cervical adenopathy. Neurological: She is alert and oriented to person, place, and time. Skin: Skin is warm and dry. She is not diaphoretic.    Psychiatric: She has a normal mood and affect. Her behavior is normal.   Nursing note and vitals reviewed. MDM  Number of Diagnoses or Management Options  Diagnosis management comments:   DDx: DVT, thrombophlebitis, leg pain. Amount and/or Complexity of Data Reviewed  Clinical lab tests: ordered and reviewed  Tests in the radiology section of CPT®: reviewed and ordered  Review and summarize past medical records: yes  Discuss the patient with other providers: yes (PCP)  Independent visualization of images, tracings, or specimens: yes    Patient Progress  Patient progress: stable    ED Course       Procedures      PROGRESS NOTE:  1:47 PM  Pt was reevaluated; she has a nodule in her right abdomen and a small hematoma at the site of her Lovenox injection which is not tender and shows no signs of infection. Informed pt that duplex came back negative. Written by ANDREW Blackwellibjoselin, as dictated by Macie Amado PA-C.      CONSULT NOTE:  2:15 PM  Macie Amado PA-C spoke with Mick Calles NP,  Specialty: PCP  Discussed patient's hx, disposition, and available diagnostic and imaging results. Reviewed care plans. Consultant agrees with plans as outlined. Spoke to nurse on behalf of PCP, recommends extending Lovenox due to pt being sub-therapeutic and increasing coumadin to 10 mg for Monday-Friday and 5 mg for Saturday-Sunday. Recommends pt take 10 mg this coming Saturday and Sunday (3/18-19/17). Will follow up with pt on Monday (3/20/17) for repeat INR. Written by ANDREW Blackwellibe, as dictated by Macie Amado PA-C. PROGRESS NOTE:  2:37 PM  Spoke to pt; pt is currently taking coumadin 7.5 Monday-Friday and 5 mg Saturday-Sunday. Pt is also taking Lovenox 120 mg BID; she states that she will run out of Lovenox tomorrow with what she has at home.   Written by ANDREW Blackwell, as dictated by Macie Amado PA-C.        LABORATORY TESTS:  Recent Results (from the past 12 hour(s))   CBC WITH AUTOMATED DIFF    Collection Time: 03/17/17  1:31 PM   Result Value Ref Range    WBC 7.4 3.6 - 11.0 K/uL    RBC 4.82 3.80 - 5.20 M/uL    HGB 13.8 11.5 - 16.0 g/dL    HCT 43.4 35.0 - 47.0 %    MCV 90.0 80.0 - 99.0 FL    MCH 28.6 26.0 - 34.0 PG    MCHC 31.8 30.0 - 36.5 g/dL    RDW 13.9 11.5 - 14.5 %    PLATELET 853 238 - 922 K/uL    NEUTROPHILS 58 32 - 75 %    LYMPHOCYTES 28 12 - 49 %    MONOCYTES 8 5 - 13 %    EOSINOPHILS 5 0 - 7 %    BASOPHILS 1 0 - 1 %    ABS. NEUTROPHILS 4.4 1.8 - 8.0 K/UL    ABS. LYMPHOCYTES 2.1 0.8 - 3.5 K/UL    ABS. MONOCYTES 0.6 0.0 - 1.0 K/UL    ABS. EOSINOPHILS 0.3 0.0 - 0.4 K/UL    ABS. BASOPHILS 0.0 0.0 - 0.1 K/UL   METABOLIC PANEL, COMPREHENSIVE    Collection Time: 03/17/17  1:31 PM   Result Value Ref Range    Sodium 142 136 - 145 mmol/L    Potassium 4.1 3.5 - 5.1 mmol/L    Chloride 108 97 - 108 mmol/L    CO2 28 21 - 32 mmol/L    Anion gap 6 5 - 15 mmol/L    Glucose 81 65 - 100 mg/dL    BUN 8 6 - 20 MG/DL    Creatinine 0.80 0.55 - 1.02 MG/DL    BUN/Creatinine ratio 10 (L) 12 - 20      GFR est AA >60 >60 ml/min/1.73m2    GFR est non-AA >60 >60 ml/min/1.73m2    Calcium 8.6 8.5 - 10.1 MG/DL    Bilirubin, total 0.3 0.2 - 1.0 MG/DL    ALT (SGPT) 34 12 - 78 U/L    AST (SGOT) 26 15 - 37 U/L    Alk.  phosphatase 86 45 - 117 U/L    Protein, total 6.3 (L) 6.4 - 8.2 g/dL    Albumin 3.2 (L) 3.5 - 5.0 g/dL    Globulin 3.1 2.0 - 4.0 g/dL    A-G Ratio 1.0 (L) 1.1 - 2.2     PROTHROMBIN TIME + INR    Collection Time: 03/17/17  1:31 PM   Result Value Ref Range    INR 1.5 (H) 0.9 - 1.1      Prothrombin time 15.0 (H) 9.0 - 11.1 sec   PTT    Collection Time: 03/17/17  1:31 PM   Result Value Ref Range    aPTT 44.4 (H) 22.1 - 32.5 sec    aPTT, therapeutic range     58.0 - 77.0 SECS       IMAGING RESULTS:  DUPLEX LOWER EXT VENOUS RIGHT   Final Result   INDICATION: Leg swelling, pain, DVT suspected right leg pain x2 days     COMPARISON: None.     FINDINGS: Duplex Doppler sonography of the right lower extremity was performed  from the groin to the calf. The right common femoral, femoral and popliteal  veins are compressible with normal color-flow and wave forms and response to  physiologic maneuvers including Valsalva and augmentation.     IMPRESSION  IMPRESSION: No deep venous thrombosis identified. IMPRESSION:  1. Pain of right calf    2. Subtherapeutic anticoagulation    3. Other acute pulmonary embolism with acute cor pulmonale (HCC)        PLAN:  1. Instructed pt to increase medications as discussed. Current Discharge Medication List      START taking these medications    Details   enoxaparin (LOVENOX) 120 mg/0.8 mL injection 120 mg by SubCUTAneous route every twelve (12) hours for 7 days. Qty: 14 Syringe, Refills: 0         CONTINUE these medications which have NOT CHANGED    Details   DULoxetine (CYMBALTA) 30 mg capsule Take 3 Caps by mouth daily. bedtime  Qty: 270 Cap, Refills: 1    Associated Diagnoses: Acute right-sided back pain with sciatica; Right hip pain; Chronic low back pain with sciatica, sciatica laterality unspecified, unspecified back pain laterality; Anxiety and depression      warfarin (COUMADIN) 5 mg tablet Take 7.5mg daily or as instructed  Indications: Pulmonary Thromboembolism  Qty: 270 Tab, Refills: 1    Associated Diagnoses: Other acute pulmonary embolism with acute cor pulmonale (HCC)      ergocalciferol (ERGOCALCIFEROL) 50,000 unit capsule Take 1 Cap by mouth every seven (7) days. Qty: 14 Cap, Refills: 1    Associated Diagnoses: Vitamin D deficiency      esomeprazole (NEXIUM) 40 mg capsule Take 1 Cap by mouth daily. Qty: 90 Cap, Refills: 1    Associated Diagnoses: Dyspepsia; Gastroesophageal reflux disease without esophagitis      mometasone-formoterol (DULERA) 200-5 mcg/actuation HFA inhaler Take 2 Puffs by inhalation two (2) times a day.   Qty: 3 Inhaler, Refills: 3    Associated Diagnoses: Chronic obstructive pulmonary disease, unspecified COPD type ([address] HYDROcodone-acetaminophen (NORCO) 5-325 mg per tablet Take 1 Tab by mouth every six (6) hours as needed for Pain. Max Daily Amount: 4 Tabs. Qty: 120 Tab, Refills: 0    Associated Diagnoses: Acute right-sided back pain with sciatica; Right hip pain; Chronic low back pain with sciatica, sciatica laterality unspecified, unspecified back pain laterality; Anxiety and depression      gabapentin (NEURONTIN) 300 mg capsule Take 300 mg by mouth two (2) times a day. Indications: 1 cap BID . 1-2 bedtime PRN      !! albuterol (PROAIR HFA) 90 mcg/actuation inhaler Take 2 Puffs by inhalation every four (4) hours as needed for Wheezing. Qty: 3 Inhaler, Refills: 3    Associated Diagnoses: Chronic obstructive pulmonary disease with acute exacerbation ([facility] [address]      ! ! albuterol (PROAIR HFA) 90 mcg/actuation inhaler Take 2 Puffs by inhalation every four (4) hours as needed for Wheezing. Qty: 3 Inhaler, Refills: 3    Associated Diagnoses: Chronic obstructive pulmonary disease, unspecified COPD type (HCC)      nortriptyline (PAMELOR) 25 mg capsule take 1 to 2 tablets by mouth at bedtime if needed  Refills: 0      ALPRAZolam (XANAX) 0.5 mg tablet Take 1 Tab by mouth nightly as needed for Anxiety. Max Daily Amount: 0.5 mg.  Qty: 30 Tab, Refills: 2    Associated Diagnoses: Anxiety      zolpidem (AMBIEN) 5 mg tablet Take 1 Tab by mouth nightly as needed for Sleep. Max Daily Amount: 5 mg. Qty: 30 Tab, Refills: 2    Associated Diagnoses: Primary insomnia      HYDROcodone-acetaminophen (NORCO) 7.5-325 mg per tablet Take 1 Tab by mouth every six (6) hours as needed for Pain. sucralfate (CARAFATE) 100 mg/mL suspension Take 5 mL by mouth four (4) times daily. Qty: 600 mL, Refills: 5    Associated Diagnoses: Dyspepsia; Gastroesophageal reflux disease without esophagitis      cyclobenzaprine (FLEXERIL) 10 mg tablet Take 1 Tab by mouth three (3) times daily as needed for Muscle Spasm(s).   Qty: 90 Tab, Refills: 2    Associated Diagnoses: Acute right-sided back pain with sciatica; DDD (degenerative disc disease), lumbosacral; DDD (degenerative disc disease), thoracic      albuterol-ipratropium (DUO-NEB) 2.5 mg-0.5 mg/3 ml nebulizer solution 3 mL by Nebulization route every four (4) hours. Indications: CHRONIC OBSTRUCTIVE PULMONARY DISEASE WITH BRONCHOSPASMS  Qty: 30 Vial, Refills: 11    Comments: Dx j44.9  Associated Diagnoses: Asthma, moderate persistent, with acute exacerbation; Chronic obstructive pulmonary disease ([address]      inhalational spacing device 1 Each by Does Not Apply route as needed. Qty: 1 Device, Refills: 0    Associated Diagnoses: Asthma, moderate persistent, with acute exacerbation; Chronic obstructive pulmonary disease (HCC)      Peak Flow Meter hussain 1 Device by Does Not Apply route daily. Qty: 1 Device, Refills: 0    Associated Diagnoses: Asthma, moderate persistent, with acute exacerbation; Chronic obstructive pulmonary disease ([address]       ! ! - Potential duplicate medications found. Please discuss with provider. 2.   Follow-up Information     Follow up With Details Comments Contact Info    Hipolito Castaneda NP In 3 days on Monday repeat labs [address] [place] [address]  [phone]          Return to ED if worse       DISCHARGE NOTE:  2:43 PM  The patient has been re-evaluated and is ready for discharge. Reviewed available results with patient. Counseled patient on diagnosis and care plan. Patient has expressed understanding, and all questions have been answered. Patient agrees with plan and agrees to follow up as recommended, or return to the ED if their symptoms worsen. Discharge instructions have been provided and explained to the patient, along with reasons to return to the ED. This note is prepared by Sendy Yates, acting as Scribe for [employer]. [employer]: The scribe's documentation has been prepared under my direction and personally reviewed by me in its entirety.  I confirm that the note above accurately reflects all work, treatment, procedures, and medical decision making performed by me.

## 2017-03-17 NOTE — TELEPHONE ENCOUNTER
Pranav Castanon from [facility] ED called to let us know that Anny Holt was in the ED for leg pain, fear of a blood clot in her leg. Tests were run with no complication but her INR was 1.5, which has dropped since she was in the office on the 13th. Called Genevieve Kapadia and she advised an increase of Warfarin from 7.5mg daily to 10 mg Mon-Friday and 5mg on Sat-Sun. Patient will be coming in on Monday to have INR rechecked.

## 2017-03-19 NOTE — DISCHARGE SUMMARY
Hospitalist Discharge Summary     Patient ID:  Cindy Joshi  [id number]  39 y.o.  1960    PCP on record: Kezia Curtis NP    Admit date: 3/4/2017  Discharge date and time: 3/08/2017      DISCHARGE DIAGNOSIS:    Acute Pulmonary Embolism POA with hypoxemia   Troponin elevation, mild  PAF  HTN  Asthma/COPD  Anxiety/Depression  Fibromyalgia  Morbid obesity  GERD      CONSULTATIONS:  IP CONSULT TO CARDIOLOGY    Excerpted HPI from H&P of Estuardo Lau MD:  CHIEF COMPLAINT: \"I'm SOB, my chest hurts\"     HISTORY OF PRESENT ILLNESS:   Monika Rendon is a 61 y.o.  female  with PMHx of anemia, asthma, COPD, and arrhythmia presenting ambulatory to ED [facility] c/o SOB since yesterday afternoon. She reports additional symptom of wheezing with her SOB. Pt notes that as she was getting out of the car yesterday, walking into her house she started developing SOB. Her SOB continued through the night, and into the morning. Pt notes that her SOB is exacerbated with exertion. She reports that she has a hx of asthma and COPD, but she has never had SOB this severe. Pt notes that she has been using her rescue inhaler with no relief. She reports that she has a pulse oximeter at home, and at home her O2 Sats were in the 60%s and she had blue colored lips. Pt used a nebulizer treatment ~1 and a half hours ago which brought her O2 Sats to 90%, but then her Sats started to decline again. In route to ED, she used 2 L O2 which provided relief, but pt notes that normally she is not on O2. Earlier yesterday, pt went to physical therapy, and was fine throughout her appointment. She reports that she had a stress test a couple months ago, and a bronchoscopy recently. Pt denies fever, chills, chest pain, hx of DVT, hx of PE, or recent steroid use.   At this time patient seated in bed c/o SOB, pleuritic chest pain, some cough, no sputum, denies N/V no diarrhea, no fever, no urinary symptoms, no other associated symptoms,. We were asked to admit for work up and evaluation of the above problems. ______________________________________________________________________  DISCHARGE SUMMARY/HOSPITAL COURSE:  for full details see H&P, daily progress notes, labs, consult notes. Acute Pulmonary Embolism POA with hypoxemia   - CTA of chest: Right pulmonary artery PE, with extension into the interlobar, right lower lobe, and right upper lobe pulmonary arteries. Smaller segmental and subsegmental pulmonary emboli in the bilateral lungs. Right heart strain.   - Hx of paroxysmal a-fib, was following by Dr. Juanis Pete about 8 years ago, but did not kept up with follow up. 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 She states that she will not have health insurance coverage after the end of this month. - transitioning lovenox to coumadin. Provided prescription for lovenox and coumadin. Follow up with PCP for INR check       Troponin elevation, mild  PAF  - secondary to right heart strain, troponin trending down to 0.20  - Echo EF 60%, no MR, no AS. RIGHT VENTRICLE: The size was normal. Systolic function was normal. Wall thickness was normal       HTN  - c/w diltiazem      Asthma/COPD  - not wheezing at this time, will c/w Duonebs prn.  - Continue Breo (takes dulera 200/5 at home)  - Has upcoming appt with Pulmonologist on 6/19/2017      Anxiety/Depression  - continue Effexor XR 150mg daily, Xanax 0.5mg as need      Fibromyalgia  - recommend stopping celebrex (home medication) while on coumadin. Can continue on flexeril and percocet prn at home. Patient expressed that Neurontin has worked to treat her neuropathic pain. But, she stop filling the medication.      Urinary urgency/ spasm (hx of chronic cystitis; follows by Dr. Rufino Marquez, )  - repeat U/A done which is (-), started on pyridium     Morbid obesity  -  BMI of 41.74, height of 5'6\" and a weight of 258 lbs.     GERD: c/w PPI    _______________________________________________________________________  Patient seen and examined by me on discharge day. Pertinent Findings:  General: WD, WN. Alert, cooperative, no acute distress    EENT:  EOMI. Anicteric sclerae. MMM  Resp:  CTA bilaterally, no wheezing or rales. No accessory muscle use  CV:  Regular rhythm,  No edema  GI:  Soft, obese, Non tender.  +Bowel sounds  Neurologic:  Alert and oriented X 3, normal speech,   Psych:   Good insight. Not anxious nor agitated  Skin:  No rashes. No jaundice  _______________________________________________________________________  DISCHARGE MEDICATIONS:   Discharge Medication List as of 3/8/2017  2:43 PM      START taking these medications    Details   enoxaparin (LOVENOX) 120 mg/0.8 mL injection 120 mg by SubCUTAneous route every twelve (12) hours for 7 days. , Print, Disp-14 Syringe, R-0      warfarin (COUMADIN) 5 mg tablet Take 7.5mg daily or as instructed, Print, Disp-60 Tab, R-0         CONTINUE these medications which have NOT CHANGED    Details   !! albuterol (PROAIR HFA) 90 mcg/actuation inhaler Take 2 Puffs by inhalation every four (4) hours as needed for Wheezing., Normal, Disp-3 Inhaler, R-3      venlafaxine-SR (EFFEXOR-XR) 150 mg capsule Take 1 Cap by mouth daily. Indications: nerves, Normal, Disp-90 Cap, R-1      !! albuterol (PROAIR HFA) 90 mcg/actuation inhaler Take 2 Puffs by inhalation every four (4) hours as needed for Wheezing., Normal, Disp-3 Inhaler, R-3      hydroCHLOROthiazide (HYDRODIURIL) 12.5 mg tablet Take 1 Tab by mouth daily. , Normal, Disp-30 Tab, R-2      ergocalciferol (ERGOCALCIFEROL) 50,000 unit capsule Take 1 Cap by mouth every seven (7) days. , Normal, Disp-14 Cap, R-1      nortriptyline (PAMELOR) 25 mg capsule take 1 to 2 tablets by mouth at bedtime if needed, Historical Med, R-0      ALPRAZolam (XANAX) 0.5 mg tablet Take 1 Tab by mouth nightly as needed for Anxiety. Max Daily Amount: 0.5 mg., Print, Disp-30 Tab, R-2      zolpidem (AMBIEN) 5 mg tablet Take 1 Tab by mouth nightly as needed for Sleep. Max Daily Amount: 5 mg., Print, Disp-30 Tab, R-2      dilTIAZem CD (CARDIZEM CD) 120 mg ER capsule take 1 capsule by mouth once daily, Historical Med, R-0      HYDROcodone-acetaminophen (NORCO) 7.5-325 mg per tablet Take 1 Tab by mouth every six (6) hours as needed for Pain., Historical Med      sucralfate (CARAFATE) 100 mg/mL suspension Take 5 mL by mouth four (4) times daily. , Normal, Disp-600 mL, R-5      esomeprazole (NEXIUM) 40 mg capsule Take 1 Cap by mouth daily. , Normal, Disp-30 Cap, R-2      cyclobenzaprine (FLEXERIL) 10 mg tablet Take 1 Tab by mouth three (3) times daily as needed for Muscle Spasm(s). , Normal, Disp-90 Tab, R-2      mometasone-formoterol (DULERA) 200-5 mcg/actuation HFA inhaler Take 2 Puffs by inhalation two (2) times a day., Normal, Disp-3 Inhaler, R-3      albuterol-ipratropium (DUO-NEB) 2.5 mg-0.5 mg/3 ml nebulizer solution 3 mL by Nebulization route every four (4) hours. Indications: CHRONIC OBSTRUCTIVE PULMONARY DISEASE WITH BRONCHOSPASMS, NormalDx j44.9Disp-30 Vial, R-11      inhalational spacing device 1 Each by Does Not Apply route as needed., Normal, Disp-1 Device, R-0      Peak Flow Meter hussain 1 Device by Does Not Apply route daily. , Normal, Disp-1 Device, R-0       !! - Potential duplicate medications found. Please discuss with provider. STOP taking these medications       celecoxib (CELEBREX) 200 mg capsule Comments:   Reason for Stopping:               My Recommended Diet, Activity, Wound Care, and follow-up labs are listed in the patient's Discharge Insturctions which I have personally completed and reviewed.     _______________________________________________________________________  DISPOSITION:    Home with Family:    Home with HH/PT/OT/RN:    SNF/LTC:    LEENA:    OTHER:        Condition at Discharge: Stable  _______________________________________________________________________  Follow up with:   PCP : Severa Holiday, NP  Follow-up Information     Follow up With Details Comments [address] [place] [address], NP Go on 3/14/2017 APPOINTMENT TIME: 2:00 PM [address] [place] [address]  [phone]                Total time in minutes spent coordinating this discharge (includes going over instructions, follow-up, prescriptions, and preparing report for sign off to her PCP) :  35 minutes    Signed:  Jennifer Reed MD

## 2017-03-20 ENCOUNTER — OFFICE VISIT (OUTPATIENT)
Dept: FAMILY MEDICINE CLINIC | Age: 57
End: 2017-03-20

## 2017-03-20 VITALS
RESPIRATION RATE: 20 BRPM | HEART RATE: 108 BPM | DIASTOLIC BLOOD PRESSURE: 84 MMHG | SYSTOLIC BLOOD PRESSURE: 126 MMHG | OXYGEN SATURATION: 98 %

## 2017-03-20 DIAGNOSIS — I26.09 OTHER ACUTE PULMONARY EMBOLISM WITH ACUTE COR PULMONALE (HCC): Primary | ICD-10-CM

## 2017-03-20 DIAGNOSIS — M79.89 PAIN AND SWELLING OF RIGHT LOWER LEG: ICD-10-CM

## 2017-03-20 DIAGNOSIS — D68.9 COAGULOPATHY (HCC): ICD-10-CM

## 2017-03-20 DIAGNOSIS — M79.89 SWELLING OF LIMB: ICD-10-CM

## 2017-03-20 DIAGNOSIS — R06.02 SOB (SHORTNESS OF BREATH): ICD-10-CM

## 2017-03-20 DIAGNOSIS — M79.661 PAIN AND SWELLING OF RIGHT LOWER LEG: ICD-10-CM

## 2017-03-20 LAB
INR BLD: 2.3
PT POC: 28 SEC
VALID INTERNAL CONTROL?: YES

## 2017-03-20 NOTE — PROGRESS NOTES
4/9/2017    Chief Complaint   Patient presents with    Leg Pain     since wednesday. ED follow up. Right leg pain. Hard, swollen, hot to the touch.  Shortness of Breath     recently, started today.  Memory Loss     recent memory loss noticed more in the past few weeks    Shaking     shakes non stop. HPI: Cassandra Barron is a 61 y.o. female. Complicated history of recent PE. Right leg pain and swelling since last Wed. Pain and difficulty walking. Was in ED and had Doppler US. No DVT. On Lovenox and Warfarin. INR 2.3   Therapeutic. Can DC Lovenox. Allergies   Allergen Reactions    Pcn [Penicillins] Rash    Griseofulvin Other (comments)     Aaron-dirk syndrome    Pcn [Penicillins] Rash and Swelling    Tramadol Nausea Only and Drowsiness     Interaction with Effexor       Current Outpatient Prescriptions   Medication Sig Dispense Refill    DULoxetine (CYMBALTA) 30 mg capsule Take 3 Caps by mouth daily. bedtime (Patient taking differently: Take 90 mg by mouth nightly. 3/28/17: per pt: has not started taking yet--pending direction from prescriber on how to transition from Venlafaxine XR (current med) to Duloxetine) 270 Cap 1    ergocalciferol (ERGOCALCIFEROL) 50,000 unit capsule Take 1 Cap by mouth every seven (7) days. 14 Cap 1    esomeprazole (NEXIUM) 40 mg capsule Take 1 Cap by mouth daily. 90 Cap 1    mometasone-formoterol (DULERA) 200-5 mcg/actuation HFA inhaler Take 2 Puffs by inhalation two (2) times a day. 3 Inhaler 3    albuterol (PROAIR HFA) 90 mcg/actuation inhaler Take 2 Puffs by inhalation every four (4) hours as needed for Wheezing. 3 Inhaler 3    nortriptyline (PAMELOR) 25 mg capsule take 1 to 2 tablets by mouth at bedtime if needed  0    ALPRAZolam (XANAX) 0.5 mg tablet Take 1 Tab by mouth nightly as needed for Anxiety. Max Daily Amount: 0.5 mg. 30 Tab 2    zolpidem (AMBIEN) 5 mg tablet Take 1 Tab by mouth nightly as needed for Sleep.  Max Daily Amount: 5 mg. 30 Tab 2    sucralfate (CARAFATE) 100 mg/mL suspension Take 5 mL by mouth four (4) times daily. 600 mL 5    cyclobenzaprine (FLEXERIL) 10 mg tablet Take 1 Tab by mouth three (3) times daily as needed for Muscle Spasm(s). 90 Tab 2    albuterol-ipratropium (DUO-NEB) 2.5 mg-0.5 mg/3 ml nebulizer solution 3 mL by Nebulization route every four (4) hours. Indications: CHRONIC OBSTRUCTIVE PULMONARY DISEASE WITH BRONCHOSPASMS 30 Vial 11    inhalational spacing device 1 Each by Does Not Apply route as needed. 1 Device 0    calcium carbonate (TUMS) 200 mg calcium (500 mg) chew Take 3 Tabs by mouth daily.  HYDROcodone-acetaminophen (NORCO) 5-325 mg per tablet Take 1 Tab by mouth every six (6) hours as needed for Pain. Max Daily Amount: 4 Tabs. 60 Tab 0    apixaban (ELIQUIS) 5 mg tablet Take 1 Tab by mouth every twelve (12) hours for 30 days. Indications: PULMONARY THROMBOEMBOLISM PREVENTION 60 Tab 0    venlafaxine-SR (EFFEXOR XR) 150 mg capsule Take 150 mg by mouth daily.  albuterol (PROVENTIL VENTOLIN) 2.5 mg /3 mL (0.083 %) nebulizer solution by Nebulization route once.  gabapentin (NEURONTIN) 300 mg capsule Take 300 mg by mouth two (2) times a day.  gabapentin (NEURONTIN) 300 mg capsule Take 600 mg by mouth nightly.  vitamin A (AQUASOL A) 10,000 unit capsule Take 10,000 Units by mouth daily.  cyanocobalamin (VITAMIN B-12) 1,000 mcg tablet Take 1,000 mcg by mouth daily.  ferrous sulfate (SLOW FE) 142 mg (45 mg iron) ER tablet Take 45 mg by mouth Daily (before breakfast).          Past Medical History:   Diagnosis Date    Anemia 3/15/2013    Arrhythmia     paroxsimal afib    Asthma 3/15/2013    Asthma     Back disorder     disc problem    Chronic obstructive pulmonary disease (HCC)     Morbid obesity ([facility] [address] 3/15/2013       Lab Results   Component Value Date/Time    Hemoglobin A1c 5.3 03/05/2017 02:32 AM    Hemoglobin A1c 5.6 01/19/2017 11:53 AM    Hemoglobin A1c 6.0 06/11/2015 03:16 PM Glucose 81 03/28/2017 06:59 AM    LDL, calculated 136 01/19/2017 11:53 AM    Creatinine 0.52 03/28/2017 06:59 AM       ROS:  Constitutional: No fever, chills or weight loss  Respiratory: No cough, SOB   CV: No chest pain or Palpitations  GI: No nausea, vomiting or diarrhea. : No dysuria or hematuria. Neuro: No headaches, seizures, change in mental status. Physical Exam:   VS Visit Vitals    /84 (BP 1 Location: Right arm, BP Patient Position: Sitting)    Pulse (!) 108    Resp 20    SpO2 98%      Eyes Conjunctiva and lids normal.    PERRLA, EOMI.   ENMT External ears and nose normal.  Canals normal, TMs normal.   Lips, teeth, gums normal, mucous membranes moist.    Oropharynx: no erythema, no exudates, no lesions, normal tongue. NECK Thyroid: normal size, nontender. Trachea midline, neck symmetrical and without masses. Carotids 2+ with no bruits. No enlarged nodes. RESP Clear to auscultation and percussion. No rales, wheezes, rhonchi, or rubs. CV RRR, with no S3 or S4, no murmur, no rub. GI   Normal bowel sounds, no bruit, soft, nontender, without masses. MSKEL Antalgic gait due to leg pain. EXT Right is swollen. Tender to palpation. Pain with weight bearing. SKIN Skin warm, normal turgor. NEURO Cranial nerves normal 2-12. No abnormal movement  Sensation vibration and filament   DTR 2+ in upper and lower extremities. PSYCH Judgment and insight good. Oriented to person, place, and time. Affect is alert and attentive. 1. Other acute pulmonary embolism with acute cor pulmonale (HCC)  INR is therapeutic. Check labs. Check CT abdomen to r/o mass or intrta-abdominal thrombus. - AMB POC PT/INR  - COLLECTION CAPILLARY BLOOD SPECIMEN  - CBC WITH AUTOMATED DIFF  - METABOLIC PANEL, COMPREHENSIVE  - CT ABD PELV W CONT; Future    2. SOB (shortness of breath)  Check labs. She had a PE may take several weeks for SOB to resolve.    - CBC WITH AUTOMATED DIFF  - METABOLIC PANEL, COMPREHENSIVE  - CT ABD PELV W CONT; Future    3. Pain and swelling of right lower leg  Check labs  Check CT   - CBC WITH AUTOMATED DIFF  - METABOLIC PANEL, COMPREHENSIVE  - CT ABD PELV W CONT; Future    4. Swelling of limb  Check CT   - CT ABD PELV W CONT; Future    5. Coagulopathy ([address]    - CT ABD PELV W CONT; Future        Orders Placed This Encounter    COLLECTION CAPILLARY BLOOD SPECIMEN    CT ABD PELV W CONT     Standing Status:   Future     Number of Occurrences:   1     Standing Expiration Date:   4/20/2018     Order Specific Question:   Is Patient Allergic to Contrast Dye? Answer:   No    CBC WITH AUTOMATED DIFF    METABOLIC PANEL, COMPREHENSIVE    AMB POC PT/INR       Follow-up Disposition:  Return in about 3 days (around 3/23/2017).         DIYA Lopez

## 2017-03-20 NOTE — MR AVS SNAPSHOT
Visit Information Date & Time Provider Department Dept. Phone Encounter #  
 3/20/2017 10:00 AM Lyndsey Rincon NP [address] [place] [address] [id number] Your Appointments 6/19/2017  3:00 PM  
ESTABLISHED PATIENT with Lyndsey Rincon NP  
Chaya Li ([address]) Appt Note: f/u visit [id number] [facility] [address] [phone]  
  
   
 [address] [facility] [address] Upcoming Health Maintenance Date Due Hepatitis C Screening 1960 COLONOSCOPY 12/4/1977 DTaP/Tdap/Td series (1 - Tdap) 12/4/1980 PAP AKA CERVICAL CYTOLOGY 12/4/1980 BREAST CANCER SCRN MAMMOGRAM 9/22/2017 Allergies as of 3/20/2017  Review Complete On: 3/20/2017 By: Patito Childress Severity Noted Reaction Type Reactions Pcn [Penicillins] High 11/14/2014    Rash Griseofulvin  11/14/2014    Other (comments) Aaron-dirk syndrome Pcn [Penicillins]  03/15/2013    Rash, Swelling Tramadol  08/19/2015    Nausea Only, Drowsiness Current Immunizations  Reviewed on 9/28/2016 Name Date Influenza High Dose Vaccine PF 9/28/2016, 9/24/2015 Pneumococcal Conjugate (PCV-13) 8/26/2015 Not reviewed this visit You Were Diagnosed With   
  
 Codes Comments Other acute pulmonary embolism with acute cor pulmonale (HCC)    -  Primary ICD-10-CM: I26.09   
 SOB (shortness of breath)     ICD-10-CM: R06.02 
ICD-9-CM: 786.05 Pain and swelling of right lower leg     ICD-10-CM: M79.661, M79.89 ICD-9-CM: 729.5, 729.81 Swelling of limb     ICD-10-CM: M79.89 ICD-9-CM: 729.81 Coagulopathy ([address]     ICD-10-CM: R07.3 ICD-9-CM: 286. 9 Vitals BP Pulse Resp SpO2 OB Status Smoking Status 126/84 (BP 1 Location: Right arm, BP Patient Position: Sitting) (!) 108 20 98% Menopause Never Smoker Vitals History Preferred Pharmacy Pharmacy Name Phone [facility] [address] [phone] Your Updated Medication List  
  
   
This list is accurate as of: 3/20/17 12:35 PM.  Always use your most recent med list.  
  
  
  
  
 * albuterol 90 mcg/actuation inhaler Commonly known as:  PROAIR HFA Take 2 Puffs by inhalation every four (4) hours as needed for Wheezing. * albuterol 90 mcg/actuation inhaler Commonly known as:  PROAIR HFA Take 2 Puffs by inhalation every four (4) hours as needed for Wheezing. albuterol-ipratropium 2.5 mg-0.5 mg/3 ml Nebu Commonly known as:  DUO-NEB  
3 mL by Nebulization route every four (4) hours. Indications: CHRONIC OBSTRUCTIVE PULMONARY DISEASE WITH BRONCHOSPASMS ALPRAZolam 0.5 mg tablet Commonly known as:  Jan Rivas Take 1 Tab by mouth nightly as needed for Anxiety. Max Daily Amount: 0.5 mg.  
  
 cyclobenzaprine 10 mg tablet Commonly known as:  FLEXERIL Take 1 Tab by mouth three (3) times daily as needed for Muscle Spasm(s). DULoxetine 30 mg capsule Commonly known as:  CYMBALTA Take 3 Caps by mouth daily. bedtime  
  
 enoxaparin 120 mg/0.8 mL injection Commonly known as:  LOVENOX  
120 mg by SubCUTAneous route every twelve (12) hours for 7 days. ergocalciferol 50,000 unit capsule Commonly known as:  ERGOCALCIFEROL Take 1 Cap by mouth every seven (7) days. esomeprazole 40 mg capsule Commonly known as:  Makenzie Wood Take 1 Cap by mouth daily. gabapentin 300 mg capsule Commonly known as:  NEURONTIN Take 300 mg by mouth two (2) times a day. Indications: 1 cap BID . 1-2 bedtime PRN  
  
 inhalational spacing device 1 Each by Does Not Apply route as needed. mometasone-formoterol 200-5 mcg/actuation HFA inhaler Commonly known as:  Marquita Jono Take 2 Puffs by inhalation two (2) times a day. * NORCO 7.5-325 mg per tablet Generic drug:  HYDROcodone-acetaminophen Take 1 Tab by mouth every six (6) hours as needed for Pain. * HYDROcodone-acetaminophen 5-325 mg per tablet Commonly known as:  Ana María Sullivan Take 1 Tab by mouth every six (6) hours as needed for Pain. Max Daily Amount: 4 Tabs. nortriptyline 25 mg capsule Commonly known as:  PAMELOR  
take 1 to 2 tablets by mouth at bedtime if needed Peak Flow Meter Karina  
1 Device by Does Not Apply route daily. sucralfate 100 mg/mL suspension Commonly known as:  Emmanuel Varela Take 5 mL by mouth four (4) times daily. warfarin 5 mg tablet Commonly known as:  COUMADIN Take 7.5mg daily or as instructed  Indications: Pulmonary Thromboembolism  
  
 zolpidem 5 mg tablet Commonly known as:  AMBIEN Take 1 Tab by mouth nightly as needed for Sleep. Max Daily Amount: 5 mg.  
  
 * Notice: This list has 4 medication(s) that are the same as other medications prescribed for you. Read the directions carefully, and ask your doctor or other care provider to review them with you. We Performed the Following AMB POC PT/INR [92090 CPT(R)] CBC WITH AUTOMATED DIFF [44350 CPT(R)] COLLECTION CAPILLARY BLOOD SPECIMEN [56868 CPT(R)] METABOLIC PANEL, COMPREHENSIVE [61924 CPT(R)] To-Do List   
 03/20/2017 Imaging:  CT ABD PELV W CONT   
  
 03/20/2017 3:00 PM  
  Appointment with [address] at [facility] RAD [facility] ([phone]) DIET RESTRICTIONS - NPO, do not eat or drink 4 hours prior to test.  EXAM INSTRUCTIONS - Arrive 2 hours prior to your appt. to allow time to drink oral contrast.  Disregard instruction below to arrive 30 min. early. - For female patients age 8to 21years old: If you have not had a pregnancy result by your physician within 24 hours of your exam, you will be required to take a pregnancy test when you arrive at the hospital.  2 [facility] a list of all medications you are currently taking, including over the counter medications. - Only patients will be allowed in the exam room.   This includes children. - Children under the age of 15 may not be left unattended. - [address] [facility] patients must have an armband and be accompanied by a caregiver or family member. - If you have a hand-written prescription for this exam, you must bring it with you on the day of your exam. - Bring all relevant prior images from any facility outside of [facility] with you on the day of your exam.  Failure to provide images may delay reading by Radiologist.  If you have questions or need to reschedule or cancel your appointment, call [phone]. Introducing [facility] SERVICES! Dear Ariana Orn: Thank you for requesting a [employer] account. Our records indicate that you already have an active [employer] account. You can access your account anytime at [email or web address] Did you know that you can access your hospital and ER discharge instructions at any time in [employer]? You can also review all of your test results from your hospital stay or ER visit. Additional Information If you have questions, please visit the Frequently Asked Questions section of the [employer] website at [email or web address] Remember, [employer] is NOT to be used for urgent needs. For medical emergencies, dial 911. Now available from your iPhone and Android! Please provide this summary of care documentation to your next provider. Your primary care clinician is listed as Amber Wu. If you have any questions after today's visit, please call [phone].

## 2017-03-22 ENCOUNTER — TELEPHONE (OUTPATIENT)
Dept: FAMILY MEDICINE CLINIC | Age: 57
End: 2017-03-22

## 2017-03-22 DIAGNOSIS — M79.661 PAIN AND SWELLING OF LOWER LEG, RIGHT: Primary | ICD-10-CM

## 2017-03-22 DIAGNOSIS — M79.89 PAIN AND SWELLING OF LOWER LEG, RIGHT: Primary | ICD-10-CM

## 2017-03-22 NOTE — TELEPHONE ENCOUNTER
Yosef Ferrer called. Increased swelling and warmth in right leg. Pain in right hip worse. Severe pain. Could not get out of bed this am.  Need Angelina Umana to call her please today.

## 2017-03-22 NOTE — TELEPHONE ENCOUNTER
Kristene Goodpasture wanted [address] to know it is Gabapentin 300 mg 2 x day and 2 at bedtime #120.

## 2017-03-23 ENCOUNTER — HOSPITAL ENCOUNTER (INPATIENT)
Age: 57
LOS: 5 days | Discharge: HOME | DRG: 813 | End: 2017-03-28
Attending: EMERGENCY MEDICINE | Admitting: HOSPITALIST
Payer: COMMERCIAL

## 2017-03-23 ENCOUNTER — APPOINTMENT (OUTPATIENT)
Dept: ULTRASOUND IMAGING | Age: 57
DRG: 813 | End: 2017-03-23
Attending: EMERGENCY MEDICINE
Payer: COMMERCIAL

## 2017-03-23 ENCOUNTER — APPOINTMENT (OUTPATIENT)
Dept: GENERAL RADIOLOGY | Age: 57
DRG: 813 | End: 2017-03-23
Attending: HOSPITALIST
Payer: COMMERCIAL

## 2017-03-23 ENCOUNTER — APPOINTMENT (OUTPATIENT)
Dept: ULTRASOUND IMAGING | Age: 57
DRG: 813 | End: 2017-03-23
Attending: HOSPITALIST
Payer: COMMERCIAL

## 2017-03-23 DIAGNOSIS — M54.41 ACUTE RIGHT-SIDED BACK PAIN WITH SCIATICA: ICD-10-CM

## 2017-03-23 DIAGNOSIS — M25.551 RIGHT HIP PAIN: ICD-10-CM

## 2017-03-23 DIAGNOSIS — F41.9 ANXIETY AND DEPRESSION: ICD-10-CM

## 2017-03-23 DIAGNOSIS — M79.604 LEG PAIN, DIFFUSE, RIGHT: Primary | ICD-10-CM

## 2017-03-23 DIAGNOSIS — G89.29 CHRONIC LOW BACK PAIN WITH SCIATICA, SCIATICA LATERALITY UNSPECIFIED, UNSPECIFIED BACK PAIN LATERALITY: ICD-10-CM

## 2017-03-23 DIAGNOSIS — M54.40 CHRONIC LOW BACK PAIN WITH SCIATICA, SCIATICA LATERALITY UNSPECIFIED, UNSPECIFIED BACK PAIN LATERALITY: ICD-10-CM

## 2017-03-23 DIAGNOSIS — F32.A ANXIETY AND DEPRESSION: ICD-10-CM

## 2017-03-23 PROBLEM — M79.89 PAIN AND SWELLING OF RIGHT LOWER LEG: Status: ACTIVE | Noted: 2017-03-23

## 2017-03-23 PROBLEM — M79.661 PAIN AND SWELLING OF RIGHT LOWER LEG: Status: ACTIVE | Noted: 2017-03-23

## 2017-03-23 LAB
ALBUMIN SERPL BCP-MCNC: 3.5 G/DL (ref 3.5–5)
ALBUMIN/GLOB SERPL: 0.9 {RATIO} (ref 1.1–2.2)
ALP SERPL-CCNC: 101 U/L (ref 45–117)
ALT SERPL-CCNC: 46 U/L (ref 12–78)
ANION GAP BLD CALC-SCNC: 9 MMOL/L (ref 5–15)
APTT PPP: 44.8 SEC (ref 22.1–32.5)
AST SERPL W P-5'-P-CCNC: 44 U/L (ref 15–37)
BASOPHILS # BLD AUTO: 0.1 K/UL (ref 0–0.1)
BASOPHILS # BLD: 1 % (ref 0–1)
BILIRUB SERPL-MCNC: 0.8 MG/DL (ref 0.2–1)
BUN SERPL-MCNC: 7 MG/DL (ref 6–20)
BUN/CREAT SERPL: 10 (ref 12–20)
CALCIUM SERPL-MCNC: 9.2 MG/DL (ref 8.5–10.1)
CHLORIDE SERPL-SCNC: 103 MMOL/L (ref 97–108)
CO2 SERPL-SCNC: 28 MMOL/L (ref 21–32)
CREAT SERPL-MCNC: 0.69 MG/DL (ref 0.55–1.02)
EOSINOPHIL # BLD: 0.3 K/UL (ref 0–0.4)
EOSINOPHIL NFR BLD: 3 % (ref 0–7)
ERYTHROCYTE [DISTWIDTH] IN BLOOD BY AUTOMATED COUNT: 13.7 % (ref 11.5–14.5)
GLOBULIN SER CALC-MCNC: 3.7 G/DL (ref 2–4)
GLUCOSE SERPL-MCNC: 91 MG/DL (ref 65–100)
HCT VFR BLD AUTO: 43.2 % (ref 35–47)
HGB BLD-MCNC: 13.9 G/DL (ref 11.5–16)
INR PPP: 2.3 (ref 0.9–1.1)
LYMPHOCYTES # BLD AUTO: 28 % (ref 12–49)
LYMPHOCYTES # BLD: 2.3 K/UL (ref 0.8–3.5)
MCH RBC QN AUTO: 28.4 PG (ref 26–34)
MCHC RBC AUTO-ENTMCNC: 32.2 G/DL (ref 30–36.5)
MCV RBC AUTO: 88.2 FL (ref 80–99)
MONOCYTES # BLD: 0.7 K/UL (ref 0–1)
MONOCYTES NFR BLD AUTO: 8 % (ref 5–13)
NEUTS SEG # BLD: 4.9 K/UL (ref 1.8–8)
NEUTS SEG NFR BLD AUTO: 60 % (ref 32–75)
PLATELET # BLD AUTO: 363 K/UL (ref 150–400)
POTASSIUM SERPL-SCNC: 3.9 MMOL/L (ref 3.5–5.1)
PROT SERPL-MCNC: 7.2 G/DL (ref 6.4–8.2)
PROTHROMBIN TIME: 24.3 SEC (ref 9–11.1)
RBC # BLD AUTO: 4.9 M/UL (ref 3.8–5.2)
SODIUM SERPL-SCNC: 140 MMOL/L (ref 136–145)
THERAPEUTIC RANGE,PTTT: ABNORMAL SECS (ref 58–77)
WBC # BLD AUTO: 8.2 K/UL (ref 3.6–11)

## 2017-03-23 PROCEDURE — 93971 EXTREMITY STUDY: CPT

## 2017-03-23 PROCEDURE — 65270000029 HC RM PRIVATE

## 2017-03-23 PROCEDURE — 96374 THER/PROPH/DIAG INJ IV PUSH: CPT

## 2017-03-23 PROCEDURE — 85730 THROMBOPLASTIN TIME PARTIAL: CPT | Performed by: EMERGENCY MEDICINE

## 2017-03-23 PROCEDURE — 74011250636 HC RX REV CODE- 250/636

## 2017-03-23 PROCEDURE — 80053 COMPREHEN METABOLIC PANEL: CPT | Performed by: EMERGENCY MEDICINE

## 2017-03-23 PROCEDURE — 85025 COMPLETE CBC W/AUTO DIFF WBC: CPT | Performed by: EMERGENCY MEDICINE

## 2017-03-23 PROCEDURE — 73630 X-RAY EXAM OF FOOT: CPT

## 2017-03-23 PROCEDURE — 76881 US COMPL JOINT R-T W/IMG: CPT

## 2017-03-23 PROCEDURE — 96372 THER/PROPH/DIAG INJ SC/IM: CPT

## 2017-03-23 PROCEDURE — 74011250637 HC RX REV CODE- 250/637: Performed by: HOSPITALIST

## 2017-03-23 PROCEDURE — 99284 EMERGENCY DEPT VISIT MOD MDM: CPT

## 2017-03-23 PROCEDURE — 36415 COLL VENOUS BLD VENIPUNCTURE: CPT | Performed by: EMERGENCY MEDICINE

## 2017-03-23 PROCEDURE — 74011250636 HC RX REV CODE- 250/636: Performed by: HOSPITALIST

## 2017-03-23 PROCEDURE — 74011250636 HC RX REV CODE- 250/636: Performed by: EMERGENCY MEDICINE

## 2017-03-23 PROCEDURE — 96375 TX/PRO/DX INJ NEW DRUG ADDON: CPT

## 2017-03-23 PROCEDURE — 85610 PROTHROMBIN TIME: CPT | Performed by: EMERGENCY MEDICINE

## 2017-03-23 RX ORDER — CEFAZOLIN SODIUM IN 0.9 % NACL 2 G/100 ML
2 PLASTIC BAG, INJECTION (ML) INTRAVENOUS EVERY 8 HOURS
Status: DISCONTINUED | OUTPATIENT
Start: 2017-03-23 | End: 2017-03-24

## 2017-03-23 RX ORDER — MORPHINE SULFATE 2 MG/ML
2 INJECTION, SOLUTION INTRAMUSCULAR; INTRAVENOUS
Status: DISCONTINUED | OUTPATIENT
Start: 2017-03-23 | End: 2017-03-24

## 2017-03-23 RX ORDER — SODIUM CHLORIDE 0.9 % (FLUSH) 0.9 %
5-10 SYRINGE (ML) INJECTION EVERY 8 HOURS
Status: DISCONTINUED | OUTPATIENT
Start: 2017-03-23 | End: 2017-03-28 | Stop reason: HOSPADM

## 2017-03-23 RX ORDER — FLUTICASONE FUROATE AND VILANTEROL 200; 25 UG/1; UG/1
1 POWDER RESPIRATORY (INHALATION) DAILY
Status: DISCONTINUED | OUTPATIENT
Start: 2017-03-24 | End: 2017-03-28 | Stop reason: HOSPADM

## 2017-03-23 RX ORDER — MORPHINE SULFATE 2 MG/ML
1 INJECTION, SOLUTION INTRAMUSCULAR; INTRAVENOUS ONCE
Status: COMPLETED | OUTPATIENT
Start: 2017-03-23 | End: 2017-03-23

## 2017-03-23 RX ORDER — HYDROMORPHONE HYDROCHLORIDE 1 MG/ML
1 INJECTION, SOLUTION INTRAMUSCULAR; INTRAVENOUS; SUBCUTANEOUS
Status: COMPLETED | OUTPATIENT
Start: 2017-03-23 | End: 2017-03-23

## 2017-03-23 RX ORDER — ONDANSETRON 2 MG/ML
4 INJECTION INTRAMUSCULAR; INTRAVENOUS
Status: DISCONTINUED | OUTPATIENT
Start: 2017-03-23 | End: 2017-03-28 | Stop reason: HOSPADM

## 2017-03-23 RX ORDER — WARFARIN 10 MG/1
10 TABLET ORAL ONCE
Status: DISCONTINUED | OUTPATIENT
Start: 2017-03-23 | End: 2017-03-23

## 2017-03-23 RX ORDER — IPRATROPIUM BROMIDE AND ALBUTEROL SULFATE 2.5; .5 MG/3ML; MG/3ML
3 SOLUTION RESPIRATORY (INHALATION)
Status: DISCONTINUED | OUTPATIENT
Start: 2017-03-24 | End: 2017-03-24

## 2017-03-23 RX ORDER — GABAPENTIN 300 MG/1
300 CAPSULE ORAL 2 TIMES DAILY
Status: DISCONTINUED | OUTPATIENT
Start: 2017-03-24 | End: 2017-03-28 | Stop reason: HOSPADM

## 2017-03-23 RX ORDER — WARFARIN SODIUM 5 MG/1
5 TABLET ORAL ONCE
Status: COMPLETED | OUTPATIENT
Start: 2017-03-23 | End: 2017-03-24

## 2017-03-23 RX ORDER — DULOXETIN HYDROCHLORIDE 30 MG/1
90 CAPSULE, DELAYED RELEASE ORAL DAILY
Status: DISCONTINUED | OUTPATIENT
Start: 2017-03-24 | End: 2017-03-24

## 2017-03-23 RX ORDER — SODIUM CHLORIDE 0.9 % (FLUSH) 0.9 %
5-10 SYRINGE (ML) INJECTION AS NEEDED
Status: DISCONTINUED | OUTPATIENT
Start: 2017-03-23 | End: 2017-03-28 | Stop reason: HOSPADM

## 2017-03-23 RX ORDER — ONDANSETRON 2 MG/ML
4 INJECTION INTRAMUSCULAR; INTRAVENOUS
Status: COMPLETED | OUTPATIENT
Start: 2017-03-23 | End: 2017-03-23

## 2017-03-23 RX ORDER — GABAPENTIN 300 MG/1
300 CAPSULE ORAL 2 TIMES DAILY
Qty: 180 CAP | Refills: 3 | Status: ON HOLD | OUTPATIENT
Start: 2017-03-23 | End: 2017-03-24

## 2017-03-23 RX ORDER — MORPHINE SULFATE 2 MG/ML
INJECTION, SOLUTION INTRAMUSCULAR; INTRAVENOUS
Status: COMPLETED
Start: 2017-03-23 | End: 2017-03-23

## 2017-03-23 RX ORDER — PANTOPRAZOLE SODIUM 40 MG/1
40 TABLET, DELAYED RELEASE ORAL
Status: DISCONTINUED | OUTPATIENT
Start: 2017-03-24 | End: 2017-03-28 | Stop reason: HOSPADM

## 2017-03-23 RX ORDER — ALPRAZOLAM 0.5 MG/1
0.5 TABLET ORAL
Status: DISCONTINUED | OUTPATIENT
Start: 2017-03-23 | End: 2017-03-28 | Stop reason: HOSPADM

## 2017-03-23 RX ORDER — SUCRALFATE 1 G/10ML
0.5 SUSPENSION ORAL 4 TIMES DAILY
Status: DISCONTINUED | OUTPATIENT
Start: 2017-03-23 | End: 2017-03-28 | Stop reason: HOSPADM

## 2017-03-23 RX ORDER — CYCLOBENZAPRINE HCL 10 MG
10 TABLET ORAL
Status: DISCONTINUED | OUTPATIENT
Start: 2017-03-23 | End: 2017-03-28 | Stop reason: HOSPADM

## 2017-03-23 RX ORDER — ZOLPIDEM TARTRATE 5 MG/1
5 TABLET ORAL
Status: DISCONTINUED | OUTPATIENT
Start: 2017-03-23 | End: 2017-03-28 | Stop reason: HOSPADM

## 2017-03-23 RX ADMIN — Medication 10 ML: at 23:11

## 2017-03-23 RX ADMIN — Medication 2 MG: at 21:38

## 2017-03-23 RX ADMIN — ONDANSETRON HYDROCHLORIDE 4 MG: 2 INJECTION, SOLUTION INTRAMUSCULAR; INTRAVENOUS at 15:50

## 2017-03-23 RX ADMIN — HYDROMORPHONE HYDROCHLORIDE 1 MG: 1 INJECTION, SOLUTION INTRAMUSCULAR; INTRAVENOUS; SUBCUTANEOUS at 15:50

## 2017-03-23 RX ADMIN — Medication 1 MG: at 23:10

## 2017-03-23 RX ADMIN — CEFAZOLIN 2 G: 10 INJECTION, POWDER, FOR SOLUTION INTRAVENOUS; PARENTERAL at 23:09

## 2017-03-23 RX ADMIN — HYDROMORPHONE HYDROCHLORIDE 1 MG: 1 INJECTION, SOLUTION INTRAMUSCULAR; INTRAVENOUS; SUBCUTANEOUS at 17:18

## 2017-03-23 NOTE — H&P
Hospitalist Admission Note    NAME: iTsha Mina   :  1960   MRN:  [id number]     Date/Time:  3/23/2017 7:15 PM    Patient PCP: Harinder Lara NP  ________________________________________________________________________    My assessment of this patient's clinical condition and my plan of care is as follows. Assessment / Plan:    Right legt swelling, pain, erythema: POA  Obvious bruising around ankle and lower leg  Differential include hematoma, cellulitis and DVT  -will order xray right ankle to r/o fracture  -will do US right leg to r/o hematoma ( holding on CT, pt has two CTs with contrast in one month)  -will empirically start on ancef for now  -prn pain medication  -keep leg elevated  -vascular Dr. Lexie Yusuf already on board    Recent H/O PE: POA  INR therapeutic 2.3, will consult pharmacy to dose it    Asthma/COPD: POA  not wheezing, continue Duonebs prn. Anxiety/Depression: POA  resume home regimen    Fibromyalgia: POA  Restart home medication    GERD: POA  Resume PPI    Code Status: full  Surrogate Decision Maker: son    DVT Prophylaxis: on coumadin  GI Prophylaxis: PPI  Baseline: lives alone, independent        Subjective:   CHIEF COMPLAINT: right leg pain    HISTORY OF PRESENT ILLNESS:     Jose Guadalupe Lockhart is a 61 y.o. Female with PMHx significant for COPD/asthma, recent PE, morbid obesity, fibromyalgia recently discharged on coumadin and lovenox for PE, presented to ED [facility] ED with c/o progressively worsening persistent right calf pain, swelling, redness and pain for one week. She was evaluated in the ED on 3/17 for the same symptoms and had a negative ultrasound. Patient was evaluated by her PCP on 3/20 for these symptoms and had a negative CT abd/pelvis. Pt denies any trauma, or insect bite. Pt unable to ambulate at home. She had another duplex in ER that is neg. Dr. Shruti Medina called Vascular surgery and Dr. Lexie Yusuf want do venogram in morning.      We were asked to admit for work up and evaluation of the above problems. Past Medical History:   Diagnosis Date    Anemia 3/15/2013    Arrhythmia     paroxsimal afib    Asthma 3/15/2013    Asthma     Back disorder     disc problem    Chronic obstructive pulmonary disease (HCC)     Morbid obesity ([address] 3/15/2013        Past Surgical History:   Procedure Laterality Date    HX ADENOIDECTOMY      HX APPENDECTOMY      HX GASTRIC BYPASS  1-15-98    Dr. William Zamarripa    HX GASTRIC BYPASS      [address]      HX ORTHOPAEDIC      arm fracture    HX TONSILLECTOMY      HX TUBAL LIGATION  80    HX TUBAL LIGATION         Social History   Substance Use Topics    Smoking status: Never Smoker    Smokeless tobacco: Never Used    Alcohol use Yes        Family History   Problem Relation Age of Onset   Verlinda Smoker Cancer Sister      lung    Other Mother      fem pop bypass    Bleeding Prob Mother     Heart Disease Mother     Cancer Father      pancreatic    Cancer Brother      Allergies   Allergen Reactions    Pcn [Penicillins] Rash    Griseofulvin Other (comments)     Aaron-dirk syndrome    Pcn [Penicillins] Rash and Swelling    Tramadol Nausea Only and Drowsiness        Prior to Admission medications    Medication Sig Start Date End Date Taking? Authorizing Provider   enoxaparin (LOVENOX) 120 mg/0.8 mL injection 120 mg by SubCUTAneous route every twelve (12) hours for 7 days. 3/17/17 3/24/17 Yes KUSUM Fitzgerald   warfarin (COUMADIN) 5 mg tablet Take 7.5mg daily or as instructed  Indications: Pulmonary Thromboembolism  Patient taking differently: 10 mg daily. Take 10mg daily Monday through Friday and 5 mg on Saturday and Sunday  Indications: Pulmonary Thromboembolism 3/13/17  Yes David Pederson NP   ergocalciferol (ERGOCALCIFEROL) 50,000 unit capsule Take 1 Cap by mouth every seven (7) days. 3/13/17  Yes David Pederson NP   esomeprazole (NEXIUM) 40 mg capsule Take 1 Cap by mouth daily.  3/13/17  Yes David Pederson NP mometasone-formoterol (DULERA) 200-5 mcg/actuation HFA inhaler Take 2 Puffs by inhalation two (2) times a day. 3/13/17  Yes Cora Su NP   HYDROcodone-acetaminophen (NORCO) 5-325 mg per tablet Take 1 Tab by mouth every six (6) hours as needed for Pain. Max Daily Amount: 4 Tabs. 3/13/17  Yes Cora Su NP   gabapentin (NEURONTIN) 300 mg capsule Take 300 mg by mouth two (2) times a day. Indications: 1 cap BID . 1-2 bedtime PRN   Yes Historical Provider   albuterol (PROAIR HFA) 90 mcg/actuation inhaler Take 2 Puffs by inhalation every four (4) hours as needed for Wheezing. 1/24/17  Yes Cora Su NP   albuterol (PROAIR HFA) 90 mcg/actuation inhaler Take 2 Puffs by inhalation every four (4) hours as needed for Wheezing. 1/23/17  Yes Cora Su NP   nortriptyline (PAMELOR) 25 mg capsule take 1 to 2 tablets by mouth at bedtime if needed 11/28/16  Yes Historical Provider   ALPRAZolam (XANAX) 0.5 mg tablet Take 1 Tab by mouth nightly as needed for Anxiety. Max Daily Amount: 0.5 mg. 1/19/17  Yes Cora Su NP   zolpidem (AMBIEN) 5 mg tablet Take 1 Tab by mouth nightly as needed for Sleep. Max Daily Amount: 5 mg. 1/19/17  Yes Cora Su NP   HYDROcodone-acetaminophen (NORCO) 7.5-325 mg per tablet Take 1 Tab by mouth every six (6) hours as needed for Pain. Yes Historical Provider   sucralfate (CARAFATE) 100 mg/mL suspension Take 5 mL by mouth four (4) times daily. 7/25/16  Yes Kevin Marcano NP   cyclobenzaprine (FLEXERIL) 10 mg tablet Take 1 Tab by mouth three (3) times daily as needed for Muscle Spasm(s). 7/5/16  Yes Kevin Marcano NP   albuterol-ipratropium (DUO-NEB) 2.5 mg-0.5 mg/3 ml nebulizer solution 3 mL by Nebulization route every four (4) hours. Indications: CHRONIC OBSTRUCTIVE PULMONARY DISEASE WITH BRONCHOSPASMS 11/9/15  Yes Dino Libman, MD   inhalational spacing device 1 Each by Does Not Apply route as needed.  3/9/15  Yes Dino Libman, MD   Peak Flow Meter hussain 1 Device by Does Not Apply route daily. 3/9/15  Yes Irais De Souza MD   DULoxetine (CYMBALTA) 30 mg capsule Take 3 Caps by mouth daily. bedtime 3/13/17   Gali Graff NP       REVIEW OF SYSTEMS:     I am not able to complete the review of systems because:    The patient is intubated and sedated    The patient has altered mental status due to his acute medical problems    The patient has baseline aphasia from prior stroke(s)    The patient has baseline dementia and is not reliable historian    The patient is in acute medical distress and unable to provide information           Total of 12 systems reviewed as follows:       POSITIVE= underlined text  Negative = text not underlined  General:  fever, chills, sweats, generalized weakness, weight loss/gain,      loss of appetite   Eyes:    blurred vision, eye pain, loss of vision, double vision  ENT:    rhinorrhea, pharyngitis   Respiratory:   cough, sputum production, SOB, TURNER, wheezing, pleuritic pain   Cardiology:   chest pain, palpitations, orthopnea, PND, edema, syncope   Gastrointestinal:  abdominal pain , N/V, diarrhea, dysphagia, constipation, bleeding   Genitourinary:  frequency, urgency, dysuria, hematuria, incontinence   Muskuloskeletal :  arthralgia, myalgia, back pain  Hematology:  easy bruising, nose or gum bleeding, lymphadenopathy   Dermatological: rash, ulceration, pruritis, color change / jaundice  Endocrine:   hot flashes or polydipsia   Neurological:  headache, dizziness, confusion, focal weakness, paresthesia,     Speech difficulties, memory loss, gait difficulty  Psychological: Feelings of anxiety, depression, agitation    Objective:   VITALS:    Visit Vitals    BP (!) 162/122 (BP 1 Location: Right arm, BP Patient Position: At rest;Sitting)    Pulse 82    Temp 97.8 °F (36.6 °C)    Resp 18    Ht 5' 6\" (1.676 m)    Wt 113.4 kg (250 lb)    SpO2 96%    BMI 40.35 kg/m2       PHYSICAL EXAM:    General:    Alert, cooperative, no distress, appears stated age. HEENT: Atraumatic, anicteric sclerae, pink conjunctivae     No oral ulcers, mucosa moist, throat clear, dentition fair  Neck:  Supple, symmetrical,  thyroid: non tender  Lungs:   Clear to auscultation bilaterally. No Wheezing or Rhonchi. No rales. Chest wall:  No tenderness  No Accessory muscle use. Heart:   Regular  rhythm,  No  murmur   No edema  Abdomen:   Soft, non-tender. Not distended. Bowel sounds normal  Extremities: Right leg swollen > left with bruise around ankle, redness, warmth and tenderness on touch, extending to lower leg      Capillary refill normal,  Radial pulse 2+,  DP +  Skin:     Not pale. Not Jaundiced  No rashes   Psych:  Good insight. Not depressed. Not anxious or agitated. Neurologic: EOMs intact. No facial asymmetry. No aphasia or slurred speech. Symmetrical strength, Sensation grossly intact. Alert and oriented X 4.     _______________________________________________________________________  Care Plan discussed with:    Comments   Patient x    Family  x    RN     Care Manager                    Consultant:      _______________________________________________________________________  Expected  Disposition:   Home with Family x   HH/PT/OT/RN    SNF/LTC    LEENA    ________________________________________________________________________  TOTAL TIME:  79 Minutes    Critical Care Provided     Minutes non procedure based      Comments     Reviewed previous records   >50% of visit spent in counseling and coordination of care  Discussion with patient and/or family and questions answered       ________________________________________________________________________  Signed: Willow Brizuela MD    Procedures: see electronic medical records for all procedures/Xrays and details which were not copied into this note but were reviewed prior to creation of Plan.     LAB DATA REVIEWED:    Recent Results (from the past 24 hour(s))   CBC WITH AUTOMATED DIFF    Collection Time: 03/23/17  3:55 PM   Result Value Ref Range    WBC 8.2 3.6 - 11.0 K/uL    RBC 4.90 3.80 - 5.20 M/uL    HGB 13.9 11.5 - 16.0 g/dL    HCT 43.2 35.0 - 47.0 %    MCV 88.2 80.0 - 99.0 FL    MCH 28.4 26.0 - 34.0 PG    MCHC 32.2 30.0 - 36.5 g/dL    RDW 13.7 11.5 - 14.5 %    PLATELET 488 730 - 698 K/uL    NEUTROPHILS 60 32 - 75 %    LYMPHOCYTES 28 12 - 49 %    MONOCYTES 8 5 - 13 %    EOSINOPHILS 3 0 - 7 %    BASOPHILS 1 0 - 1 %    ABS. NEUTROPHILS 4.9 1.8 - 8.0 K/UL    ABS. LYMPHOCYTES 2.3 0.8 - 3.5 K/UL    ABS. MONOCYTES 0.7 0.0 - 1.0 K/UL    ABS. EOSINOPHILS 0.3 0.0 - 0.4 K/UL    ABS. BASOPHILS 0.1 0.0 - 0.1 K/UL   METABOLIC PANEL, COMPREHENSIVE    Collection Time: 03/23/17  3:55 PM   Result Value Ref Range    Sodium 140 136 - 145 mmol/L    Potassium 3.9 3.5 - 5.1 mmol/L    Chloride 103 97 - 108 mmol/L    CO2 28 21 - 32 mmol/L    Anion gap 9 5 - 15 mmol/L    Glucose 91 65 - 100 mg/dL    BUN 7 6 - 20 MG/DL    Creatinine 0.69 0.55 - 1.02 MG/DL    BUN/Creatinine ratio 10 (L) 12 - 20      GFR est AA >60 >60 ml/min/1.73m2    GFR est non-AA >60 >60 ml/min/1.73m2    Calcium 9.2 8.5 - 10.1 MG/DL    Bilirubin, total 0.8 0.2 - 1.0 MG/DL    ALT (SGPT) 46 12 - 78 U/L    AST (SGOT) 44 (H) 15 - 37 U/L    Alk.  phosphatase 101 45 - 117 U/L    Protein, total 7.2 6.4 - 8.2 g/dL    Albumin 3.5 3.5 - 5.0 g/dL    Globulin 3.7 2.0 - 4.0 g/dL    A-G Ratio 0.9 (L) 1.1 - 2.2     PTT    Collection Time: 03/23/17  3:55 PM   Result Value Ref Range    aPTT 44.8 (H) 22.1 - 32.5 sec    aPTT, therapeutic range     58.0 - 77.0 SECS   PROTHROMBIN TIME + INR    Collection Time: 03/23/17  3:55 PM   Result Value Ref Range    INR 2.3 (H) 0.9 - 1.1      Prothrombin time 24.3 (H) 9.0 - 11.1 sec

## 2017-03-23 NOTE — IP AVS SNAPSHOT
[address] [facility] 
[phone] Patient: Vannesa Goode MRN: [id number] WVD:[id number] You are allergic to the following Allergen Reactions Pcn (Penicillins) Rash Griseofulvin Other (comments) Aaron-dirk syndrome Pcn (Penicillins) Rash Swelling Tramadol Nausea Only Drowsiness Recent Documentation Height Weight BMI OB Status Smoking Status 1.676 m 113.4 kg 40.35 kg/m2 Menopause Never Smoker Emergency Contacts Name Discharge Info Relation Home Work Mobile [address] CAREGIVER [3] Child [2] [phone] About your hospitalization You were admitted on:  March 23, 2017 You last received care in the:  [facility] ORTHOPEDICS You were discharged on:  March 28, 2017 Unit phone number:  [phone] Why you were hospitalized Your primary diagnosis was:  Not on File Your diagnoses also included:  Pain And Swelling Of Right Lower Leg Providers Seen During Your Hospitalizations Provider Role Specialty Primary office phone Naima Stoner MD Attending Provider Emergency Medicine [phone] Malik Dillon MD Attending Provider Internal Medicine [phone] Jorge Luis Bejarano MD Attending Provider Internal Medicine [phone] Chun Rodrigues MD Attending Provider Internal Medicine [phone] Your Primary Care Physician (PCP) Primary Care Physician Office Phone Office Fax Janes March [phone] Follow-up Information Follow up With Details Comments Contact Info Robby Rojo NP   [address] [facility] [phone] [facility] [address] [place] [address] On 3/28/2017 This is your home health provider. If you do not hear from them with in 24 hours please contact them. [address] [address] [phone] Current Discharge Medication List  
  
START taking these medications Dose & Instructions Dispensing Information Comments Morning Noon Evening Bedtime  
 apixaban 5 mg tablet Commonly known as:  Joan Mercedes Your last dose was: Your next dose is:    
   
   
 Dose:  5 mg Take 1 Tab by mouth every twelve (12) hours for 30 days. Indications: PULMONARY THROMBOEMBOLISM PREVENTION Quantity:  60 Tab Refills:  0 CONTINUE these medications which have CHANGED Dose & Instructions Dispensing Information Comments Morning Noon Evening Bedtime DULoxetine 30 mg capsule Commonly known as:  CYMBALTA What changed:   
- when to take this 
- additional instructions Your last dose was: Your next dose is:    
   
   
 Dose:  90 mg Take 3 Caps by mouth daily. bedtime Quantity:  270 Cap Refills:  1  
     
   
   
   
  
 * gabapentin 300 mg capsule Commonly known as:  NEURONTIN What changed:  Another medication with the same name was removed. Continue taking this medication, and follow the directions you see here. Your last dose was: Your next dose is:    
   
   
 Dose:  300 mg Take 300 mg by mouth two (2) times a day. Refills:  0  
     
   
   
   
  
 * gabapentin 300 mg capsule Commonly known as:  NEURONTIN What changed:  Another medication with the same name was removed. Continue taking this medication, and follow the directions you see here. Your last dose was: Your next dose is:    
   
   
 Dose:  600 mg Take 600 mg by mouth nightly. Refills:  0  
     
   
   
   
  
 * Notice: This list has 2 medication(s) that are the same as other medications prescribed for you. Read the directions carefully, and ask your doctor or other care provider to review them with you. CONTINUE these medications which have NOT CHANGED Dose & Instructions Dispensing Information Comments Morning Noon Evening Bedtime * albuterol 2.5 mg /3 mL (0.083 %) nebulizer solution Commonly known as:  PROVENTIL VENTOLIN Your last dose was: Your next dose is:    
   
   
 by Nebulization route once. Refills:  0  
     
   
   
   
  
 * albuterol 90 mcg/actuation inhaler Commonly known as:  PROAIR HFA Your last dose was: Your next dose is:    
   
   
 Dose:  2 Puff Take 2 Puffs by inhalation every four (4) hours as needed for Wheezing. Quantity:  3 Inhaler Refills:  3  
     
   
   
   
  
 albuterol-ipratropium 2.5 mg-0.5 mg/3 ml Nebu Commonly known as:  Barabara Peoples Your last dose was: Your next dose is:    
   
   
 Dose:  3 mL  
3 mL by Nebulization route every four (4) hours. Indications: CHRONIC OBSTRUCTIVE PULMONARY DISEASE WITH BRONCHOSPASMS Quantity:  30 Vial  
Refills:  11 Dx j44.9 ALPRAZolam 0.5 mg tablet Commonly known as:  Cosby Curio Your last dose was: Your next dose is:    
   
   
 Dose:  0.5 mg Take 1 Tab by mouth nightly as needed for Anxiety. Max Daily Amount: 0.5 mg.  
 Quantity:  30 Tab Refills:  2  
     
   
   
   
  
 calcium carbonate 200 mg calcium (500 mg) Chew Commonly known as:  TUMS Your last dose was: Your next dose is:    
   
   
 Dose:  1 Tab Take 1 Tab by mouth daily. Refills:  0  
     
   
   
   
  
 cyclobenzaprine 10 mg tablet Commonly known as:  FLEXERIL Your last dose was: Your next dose is:    
   
   
 Dose:  10 mg Take 1 Tab by mouth three (3) times daily as needed for Muscle Spasm(s). Quantity:  90 Tab Refills:  2 EFFEXOR  mg capsule Generic drug:  venlafaxine-SR Your last dose was: Your next dose is:    
   
   
 Dose:  150 mg Take 150 mg by mouth daily. Refills:  0  
     
   
   
   
  
 ergocalciferol 50,000 unit capsule Commonly known as:  ERGOCALCIFEROL Your last dose was: Your next dose is:    
   
   
 Dose:  44777 Units Take 1 Cap by mouth every seven (7) days. Quantity:  14 Cap Refills:  1  
     
   
   
   
  
 esomeprazole 40 mg capsule Commonly known as:  Marium Rouse Your last dose was: Your next dose is:    
   
   
 Dose:  40 mg Take 1 Cap by mouth daily. Quantity:  90 Cap Refills:  1  
     
   
   
   
  
 ferrous sulfate 142 mg (45 mg iron) ER tablet Commonly known as:  SLOW FE  
   
Your last dose was: Your next dose is:    
   
   
 Dose:  45 mg Take 45 mg by mouth Daily (before breakfast). Refills:  0 HYDROcodone-acetaminophen 5-325 mg per tablet Commonly known as:  Annamary Gretel Your last dose was: Your next dose is:    
   
   
 Dose:  1 Tab Take 1 Tab by mouth every six (6) hours as needed for Pain. Max Daily Amount: 4 Tabs. Quantity:  60 Tab Refills:  0  
     
   
   
   
  
 inhalational spacing device Your last dose was: Your next dose is:    
   
   
 Dose:  1 Each  
1 Each by Does Not Apply route as needed. Quantity:  1 Device Refills:  0  
     
   
   
   
  
 mometasone-formoterol 200-5 mcg/actuation HFA inhaler Commonly known as:  Consuella Rochester Your last dose was: Your next dose is:    
   
   
 Dose:  2 Puff Take 2 Puffs by inhalation two (2) times a day. Quantity:  3 Inhaler Refills:  3  
     
   
   
   
  
 nortriptyline 25 mg capsule Commonly known as:  PAMELOR Your last dose was: Your next dose is:    
   
   
 take 1 to 2 tablets by mouth at bedtime if needed Refills:  0  
     
   
   
   
  
 sucralfate 100 mg/mL suspension Commonly known as:  Joan Guadeloupe Your last dose was: Your next dose is:    
   
   
 Dose:  1 tsp Take 5 mL by mouth four (4) times daily. Quantity:  600 mL Refills:  5  
     
   
   
   
  
 vitamin A 10,000 unit capsule Commonly known as:  AQUASOL A Your last dose was: Your next dose is:    
   
   
 Dose:  16359 Units Take 10,000 Units by mouth daily. Refills:  0  
     
   
   
   
  
 VITAMIN B-12 1,000 mcg tablet Generic drug:  cyanocobalamin Your last dose was: Your next dose is:    
   
   
 Dose:  1000 mcg Take 1,000 mcg by mouth daily. Refills:  0  
     
   
   
   
  
 zolpidem 5 mg tablet Commonly known as:  AMBIEN Your last dose was: Your next dose is:    
   
   
 Dose:  5 mg Take 1 Tab by mouth nightly as needed for Sleep. Max Daily Amount: 5 mg. Quantity:  30 Tab Refills:  2  
     
   
   
   
  
 * Notice: This list has 2 medication(s) that are the same as other medications prescribed for you. Read the directions carefully, and ask your doctor or other care provider to review them with you. STOP taking these medications   
 enoxaparin 120 mg/0.8 mL injection Commonly known as:  LOVENOX  
   
  
 ibuprofen 200 mg tablet Commonly known as:  MOTRIN  
   
  
 phenazopyridine 200 mg tablet Commonly known as:  PYRIDIUM  
   
  
 predniSONE 20 mg tablet Commonly known as:  DELTASONE  
   
  
 warfarin 10 mg tablet Commonly known as:  COUMADIN  
   
  
 warfarin 5 mg tablet Commonly known as:  COUMADIN Where to Get Your Medications Information on where to get these meds will be given to you by the nurse or doctor. ! Ask your nurse or doctor about these medications  
  apixaban 5 mg tablet HYDROcodone-acetaminophen 5-325 mg per tablet Discharge Instructions HOSPITALIST DISCHARGE INSTRUCTIONS 
 
NAME: Tisha Mina :  1960 MRN:  [id number] Date/Time:  3/28/2017 3:04 PM 
 
ADMIT DATE: 3/23/2017 DISCHARGE DATE: 3/28/2017 · It is important that you take the medication exactly as they are prescribed. · Keep your medication in the bottles provided by the pharmacist and keep a list of the medication names, dosages, and times to be taken in your wallet. · Do not take other medications without consulting your doctor. What to do at [facility] Recommended diet:  Cardiac Diet Recommended activity: Activity as tolerated If you have questions regarding the hospital related prescriptions or hospital related issues please call [facility] at . You can always direct your questions to your primary care doctor if you are unable to reach your hospital physician; your PCP works as an extension of your hospital doctor just like your hospital doctor is an extension of your PCP for your time at the hospital [facility]) If you experience any of the following symptoms then please call your primary care physician or return to the emergency room if you cannot get hold of your doctor: 
 
Fever, chills, nausea, vomiting, or persistent diarrhea Worsening weakness or new problems with your speech or balance Dark stools or visible blood in your stools New Leg swelling or shortness of breath as this could be signs of a clot Additional Instructions: 
 
 
Bring these papers with you to your follow up appointments. The papers will help your doctors be sure to continue the care plan from the hospital. 
 
 
 
 
 
 
Information obtained by : 
I understand that if any problems occur once I am at home I am to contact my physician. I understand and acknowledge receipt of the instructions indicated above. Physician's or R.N.'s Signature                                                                  Date/Time Patient or Representative Signature Discharge Instructions Attachments/References WARFARIN: LONG-TERM USE (ENGLISH) Discharge Orders None Phizzle Announcement We are excited to announce that we are making your provider's discharge notes available to you in Phizzle. You will see these notes when they are completed and signed by the physician that discharged you from your recent hospital stay. If you have any questions or concerns about any information you see in Phizzle, please call the Health Information Department where you were seen or reach out to your Primary Care Provider for more information about your plan of care. Introducing [facility]! Dear Emilie Manzo: Thank you for requesting a Phizzle account. Our records indicate that you already have an active Phizzle account. You can access your account anytime at [email or web address] Did you know that you can access your hospital and ER discharge instructions at any time in Phizzle? You can also review all of your test results from your hospital stay or ER visit. Additional Information If you have questions, please visit the Frequently Asked Questions section of the Phizzle website at [email or web address] Remember, Phizzle is NOT to be used for urgent needs. For medical emergencies, dial 911. Now available from your iPhone and Android! General Information Please provide this summary of care documentation to your next provider. Patient Signature:  ____________________________________________________________ Date:  ____________________________________________________________  
  
Missy Rojo Provider Signature:  ____________________________________________________________ Date:  ____________________________________________________________ More Information Taking Warfarin Safely: Care Instructions Your Care Instructions Warfarin is a medicine that you take to prevent blood clots. It is often called a blood thinner. Doctors give warfarin (such as Coumadin) to reduce the risk of blood clots. You may be at risk for blood clots if you have atrial fibrillation or deep vein thrombosis. Some other health problems may also put you at risk. Warfarin slows the amount of time it takes for your blood to clot. It can cause bleeding problems. Even if you've been taking warfarin for a while, it's important to know how to take it safely. Foods and other medicines can affect the way warfarin works. Some can make warfarin work too well. This can cause bleeding problems. And some can make it work poorly, so that it does not prevent blood clots very well. You will need regular blood tests to check how long it takes for your blood to form a clot. This test is called a PT or prothrombin time test. The result of the test is called an INR level. Depending on the test results, your doctor or anticoagulation clinic may adjust your dose of warfarin. Follow-up care is a key part of your treatment and safety. Be sure to make and go to all appointments, and call your doctor if you are having problems. It's also a good idea to know your test results and keep a list of the medicines you take. How can you care for yourself at home? Take warfarin safely · Take your warfarin at the same time each day. · If you miss a dose of warfarin, don't take an extra dose to make up for it. Your doctor can tell you exactly what to do so you don't take too much or too little. · Wear medical alert jewelry that lets others know that you take warfarin. You can buy this at most drugstores. · Don't take warfarin if you are pregnant or planning to get pregnant. Talk to your doctor about how you can prevent getting pregnant while you are taking it. · Don't change your dose or stop taking warfarin unless your doctor tells you to. Effects of medicines and food on warfarin · Don't start or stop taking any medicines, vitamins, or natural remedies unless you first talk to your doctor. Many medicines can affect how warfarin works. These include aspirin and other pain relievers, over-the-counter medicines, multivitamins, dietary supplements, and herbal products. · Tell all of your doctors and pharmacists that you take warfarin. Some prescription medicines can affect how warfarin works. · Keep the amount of vitamin K in your diet about the same from day to day. Do not suddenly eat a lot more or a lot less food that is rich in vitamin K than you usually do. Vitamin K affects how warfarin works and how your blood clots. Talk with your doctor before making big changes in your diet. Vitamin K is in many foods, such as: 
¨ Leafy greens, such as kale, cabbage, spinach, Swiss chard, and lettuce. ¨ Canola and soybean oils. ¨ Green vegetables, such as asparagus, broccoli, and [place] sprouts. ¨ Vegetable drinks, green tea leaves, and some dietary supplement drinks. · Avoid cranberry juice and other cranberry products. They can increase the effects of warfarin. · Limit your use of alcohol. Avoid bleeding by preventing falls and injuries · Wear slippers or shoes with nonskid soles. · Remove throw rugs and clutter. · Rearrange furniture and electrical cords to keep them out of walking paths. · Keep stairways, porches, and outside walkways well lit. Use night-lights in hallways and bathrooms. · Be extra careful when you work with sharp tools or knives. When should you call for help? Call 911 anytime you think you may need emergency care. For example, call if: 
· You have a sudden, severe headache that is different from past headaches. Call your doctor now or seek immediate medical care if: 
· You have any abnormal bleeding, such as: 
¨ Nosebleeds. ¨ Vaginal bleeding that is different (heavier, more frequent, at a different time of the month) than what you are used to. ¨ Bloody or black stools, or rectal bleeding. ¨ Bloody or pink urine. Watch closely for changes in your health, and be sure to contact your doctor if you have any problems. Where can you learn more? Go to http://bernard-octavio.info/. Enter Z159 in the search box to learn more about \"Taking Warfarin Safely: Care Instructions. \" Current as of: January 27, 2016 Content Version: 11.1 © 6326-7680 [employer]. Care instructions adapted under license by [employer] (which disclaims liability or warranty for this information). If you have questions about a medical condition or this instruction, always ask your healthcare professional. [address] any warranty or liability for your use of this information.

## 2017-03-23 NOTE — IP AVS SNAPSHOT
Current Discharge Medication List  
  
START taking these medications Dose & Instructions Dispensing Information Comments Morning Noon Evening Bedtime  
 apixaban 5 mg tablet Commonly known as:  Cintia Haver Your last dose was: Your next dose is:    
   
   
 Dose:  5 mg Take 1 Tab by mouth every twelve (12) hours for 30 days. Indications: PULMONARY THROMBOEMBOLISM PREVENTION Quantity:  60 Tab Refills:  0 CONTINUE these medications which have CHANGED Dose & Instructions Dispensing Information Comments Morning Noon Evening Bedtime DULoxetine 30 mg capsule Commonly known as:  CYMBALTA What changed:   
- when to take this 
- additional instructions Your last dose was: Your next dose is:    
   
   
 Dose:  90 mg Take 3 Caps by mouth daily. bedtime Quantity:  270 Cap Refills:  1  
     
   
   
   
  
 * gabapentin 300 mg capsule Commonly known as:  NEURONTIN What changed:  Another medication with the same name was removed. Continue taking this medication, and follow the directions you see here. Your last dose was: Your next dose is:    
   
   
 Dose:  300 mg Take 300 mg by mouth two (2) times a day. Refills:  0  
     
   
   
   
  
 * gabapentin 300 mg capsule Commonly known as:  NEURONTIN What changed:  Another medication with the same name was removed. Continue taking this medication, and follow the directions you see here. Your last dose was: Your next dose is:    
   
   
 Dose:  600 mg Take 600 mg by mouth nightly. Refills:  0  
     
   
   
   
  
 * Notice: This list has 2 medication(s) that are the same as other medications prescribed for you. Read the directions carefully, and ask your doctor or other care provider to review them with you. CONTINUE these medications which have NOT CHANGED Dose & Instructions Dispensing Information Comments Morning Noon Evening Bedtime * albuterol 2.5 mg /3 mL (0.083 %) nebulizer solution Commonly known as:  PROVENTIL VENTOLIN Your last dose was: Your next dose is:    
   
   
 by Nebulization route once. Refills:  0  
     
   
   
   
  
 * albuterol 90 mcg/actuation inhaler Commonly known as:  PROAIR HFA Your last dose was: Your next dose is:    
   
   
 Dose:  2 Puff Take 2 Puffs by inhalation every four (4) hours as needed for Wheezing. Quantity:  3 Inhaler Refills:  3  
     
   
   
   
  
 albuterol-ipratropium 2.5 mg-0.5 mg/3 ml Nebu Commonly known as:  Murray Apley Your last dose was: Your next dose is:    
   
   
 Dose:  3 mL  
3 mL by Nebulization route every four (4) hours. Indications: CHRONIC OBSTRUCTIVE PULMONARY DISEASE WITH BRONCHOSPASMS Quantity:  30 Vial  
Refills:  11 Dx j44.9 ALPRAZolam 0.5 mg tablet Commonly known as:  Cecashley Ana Your last dose was: Your next dose is:    
   
   
 Dose:  0.5 mg Take 1 Tab by mouth nightly as needed for Anxiety. Max Daily Amount: 0.5 mg.  
 Quantity:  30 Tab Refills:  2  
     
   
   
   
  
 calcium carbonate 200 mg calcium (500 mg) Chew Commonly known as:  TUMS Your last dose was: Your next dose is:    
   
   
 Dose:  1 Tab Take 1 Tab by mouth daily. Refills:  0  
     
   
   
   
  
 cyclobenzaprine 10 mg tablet Commonly known as:  FLEXERIL Your last dose was: Your next dose is:    
   
   
 Dose:  10 mg Take 1 Tab by mouth three (3) times daily as needed for Muscle Spasm(s). Quantity:  90 Tab Refills:  2 EFFEXOR  mg capsule Generic drug:  venlafaxine-SR Your last dose was: Your next dose is:    
   
   
 Dose:  150 mg Take 150 mg by mouth daily. Refills:  0  
     
   
   
   
  
 ergocalciferol 50,000 unit capsule Commonly known as:  ERGOCALCIFEROL Your last dose was: Your next dose is:    
   
   
 Dose:  85880 Units Take 1 Cap by mouth every seven (7) days. Quantity:  14 Cap Refills:  1  
     
   
   
   
  
 esomeprazole 40 mg capsule Commonly known as:  Jermaine Feil Your last dose was: Your next dose is:    
   
   
 Dose:  40 mg Take 1 Cap by mouth daily. Quantity:  90 Cap Refills:  1  
     
   
   
   
  
 ferrous sulfate 142 mg (45 mg iron) ER tablet Commonly known as:  SLOW FE  
   
Your last dose was: Your next dose is:    
   
   
 Dose:  45 mg Take 45 mg by mouth Daily (before breakfast). Refills:  0 HYDROcodone-acetaminophen 5-325 mg per tablet Commonly known as:  Jana Hollidayer Your last dose was: Your next dose is:    
   
   
 Dose:  1 Tab Take 1 Tab by mouth every six (6) hours as needed for Pain. Max Daily Amount: 4 Tabs. Quantity:  60 Tab Refills:  0  
     
   
   
   
  
 inhalational spacing device Your last dose was: Your next dose is:    
   
   
 Dose:  1 Each  
1 Each by Does Not Apply route as needed. Quantity:  1 Device Refills:  0  
     
   
   
   
  
 mometasone-formoterol 200-5 mcg/actuation HFA inhaler Commonly known as:  Merriphoenix Wild Your last dose was: Your next dose is:    
   
   
 Dose:  2 Puff Take 2 Puffs by inhalation two (2) times a day. Quantity:  3 Inhaler Refills:  3  
     
   
   
   
  
 nortriptyline 25 mg capsule Commonly known as:  PAMELOR Your last dose was: Your next dose is:    
   
   
 take 1 to 2 tablets by mouth at bedtime if needed Refills:  0  
     
   
   
   
  
 sucralfate 100 mg/mL suspension Commonly known as:  Kourtney Deep Your last dose was: Your next dose is:    
   
   
 Dose:  1 tsp Take 5 mL by mouth four (4) times daily. Quantity:  600 mL Refills:  5  
     
   
   
   
  
 vitamin A 10,000 unit capsule Commonly known as:  AQUASOL A Your last dose was: Your next dose is:    
   
   
 Dose:  52983 Units Take 10,000 Units by mouth daily. Refills:  0  
     
   
   
   
  
 VITAMIN B-12 1,000 mcg tablet Generic drug:  cyanocobalamin Your last dose was: Your next dose is:    
   
   
 Dose:  1000 mcg Take 1,000 mcg by mouth daily. Refills:  0  
     
   
   
   
  
 zolpidem 5 mg tablet Commonly known as:  AMBIEN Your last dose was: Your next dose is:    
   
   
 Dose:  5 mg Take 1 Tab by mouth nightly as needed for Sleep. Max Daily Amount: 5 mg. Quantity:  30 Tab Refills:  2  
     
   
   
   
  
 * Notice: This list has 2 medication(s) that are the same as other medications prescribed for you. Read the directions carefully, and ask your doctor or other care provider to review them with you. STOP taking these medications   
 enoxaparin 120 mg/0.8 mL injection Commonly known as:  LOVENOX  
   
  
 ibuprofen 200 mg tablet Commonly known as:  MOTRIN  
   
  
 phenazopyridine 200 mg tablet Commonly known as:  PYRIDIUM  
   
  
 predniSONE 20 mg tablet Commonly known as:  DELTASONE  
   
  
 warfarin 10 mg tablet Commonly known as:  COUMADIN  
   
  
 warfarin 5 mg tablet Commonly known as:  COUMADIN Where to Get Your Medications Information on where to get these meds will be given to you by the nurse or doctor. ! Ask your nurse or doctor about these medications  
  apixaban 5 mg tablet HYDROcodone-acetaminophen 5-325 mg per tablet

## 2017-03-23 NOTE — ED NOTES
Pt arrives to ED with son at bedside with c/o right lower leg swelling and pain 10/10. Pt states this has been going on for over a week and was seen for this issue 1 week ago. Pt states that the pain and swelling have gotten worse since then and that the pain is not controlled by her Norco. Patient also states that she was hospitalized 2 weeks ago for bilateral PE. No other needs or concerns at this time. Call light in reach. Son at bedside. Bed locked and in lowest position.

## 2017-03-23 NOTE — PROGRESS NOTES
Pharmacy Initial Dosing of Warfarin    Pharmacy consulted to dose warfarin for this  61 y.o. female ordered warfarin for recent PE    Goal INR (2-3, 2.5-3.5, 3-4): 2-3    Concurrent anticoagulants & Platelet Inhibitors: None  Home Warfarin Dose: 10 mg M-F, 5 mg Sat/Sun  Major Interacting Medications (Dose/Frequency): None    INR (0.9-1.1) > 5 discuss with MD:   Recent Labs      03/23/17   1555   INR  2.3*   HGB  13.9   PLT  363   APTT  44.8*   ALB  3.5   SGOT  44*   ALT  46   TBILI  0.8     Warfarin Sensitivity & Sensitivity Factors Present: Weight    Impression/Plan: Warfarin 10 mg tonight if the patient has not taken dose at home today. Daily dose by INR. Pharmacy will follow daily and adjust the dose as appropriate.     Thanks for the consult    [facility]

## 2017-03-23 NOTE — ED PROVIDER NOTES
HPI Comments: Kelly Aguila, 61 y.o. Female with PMHx significant for COPD and asthma, presents ambulatory to [id number] [place] [address] ED with cc of progressively worsening persistent right calf pain with associated swelling x 1 week. The patient also c/o associated SOB that is exacerbated with exertion and numbness to her right foot. Per medical records, the patient was diagnosed with a PE on 3/4/17. Per medical records, the patient was also evaluated in the ED on 3/17 for the same symptoms and had a negative ultrasound and was diagnosed with sub therapeutic anticoagulation. Per medical records, the patient was evaluated by her PCP on 3/20 for these symptoms and had a negative CT abd/pelvis. The patient states that she is currently prescribed warfarin and Lovenox. The patient also states that she is prescribed hydrocodone 325 mg, which has provided no lasting relief. The patient also c/o excruciating right hip pain, but notes a history of arthritis in her right hip. The patient specifically denies all other acute complaints at this time. PCP: Tim Stevens NP    Social history significant for: - Tobacco, + EtOH, - Illicit Drug Use    There are no other complaints, changes, or physical findings at this time. Written by ANDREW Roman, as dictated by Arlette Valiente MD.    The history is provided by the patient and medical records. No  was used.         Past Medical History:   Diagnosis Date    Anemia 3/15/2013    Arrhythmia     paroxsimal afib    Asthma 3/15/2013    Asthma     Back disorder     disc problem    Chronic obstructive pulmonary disease (HCC)     Morbid obesity ([facility] [address] 3/15/2013       Past Surgical History:   Procedure Laterality Date    HX ADENOIDECTOMY      HX APPENDECTOMY      HX GASTRIC BYPASS  1-15-98    Dr. Osito Abbasi    HX GASTRIC BYPASS      HX HERNIA REPAIR      HX ORTHOPAEDIC      arm fracture    HX TONSILLECTOMY      HX TUBAL LIGATION  80    HX TUBAL LIGATION Family History:   Problem Relation Age of Onset   [facility] Cancer Sister      lung    Other Mother      fem pop bypass    Bleeding Prob Mother     Heart Disease Mother     Cancer Father      pancreatic    Cancer Brother        Social History     Social History    Marital status:      Spouse name: N/A    Number of children: N/A    Years of education: N/A     Occupational History    Not on file. Social History Main Topics    Smoking status: Never Smoker    Smokeless tobacco: Never Used    Alcohol use Yes    Drug use: No    Sexual activity: No     Other Topics Concern    Not on file     Social History Narrative    ** Merged History Encounter **              ALLERGIES: Pcn [penicillins]; Griseofulvin; Pcn [penicillins]; and Tramadol    Review of Systems   Constitutional: Negative. Negative for chills and fever. HENT: Negative. Negative for congestion and rhinorrhea. Respiratory: Positive for shortness of breath. Negative for cough, chest tightness and wheezing. Cardiovascular: Positive for leg swelling (Right). Negative for chest pain and palpitations. Gastrointestinal: Negative. Negative for abdominal pain, constipation, nausea and vomiting. Endocrine: Negative. Genitourinary: Negative. Negative for decreased urine volume, flank pain, hematuria and pelvic pain. Musculoskeletal: Positive for arthralgias (Right hip) and myalgias (Right calf). Negative for back pain and neck pain. Skin: Negative. Negative for color change, pallor and rash. Neurological: Positive for numbness. Negative for dizziness, seizures, weakness and headaches. Hematological: Negative. Negative for adenopathy. Psychiatric/Behavioral: Negative. All other systems reviewed and are negative.       Vitals:    03/23/17 1236 03/23/17 1406   BP: 150/90 (!) 162/122   Pulse: 93 82   Resp: 18    Temp: 97.8 °F (36.6 °C)    SpO2: 96% 96%   Weight: 113.4 kg (250 lb)    Height: 5' 6\" (1.676 m) Physical Exam   Constitutional: She is oriented to person, place, and time. She appears well-developed and well-nourished. No distress. HENT:   Head: Normocephalic and atraumatic. Mouth/Throat: No oropharyngeal exudate. Eyes: Conjunctivae are normal. Pupils are equal, round, and reactive to light. Right eye exhibits no discharge. Left eye exhibits no discharge. No scleral icterus. Neck: Normal range of motion. Neck supple. No JVD present. Cardiovascular: Normal rate, regular rhythm, normal heart sounds and intact distal pulses. PMI is not displaced. Exam reveals no gallop and no friction rub. No murmur heard. Pulses:       Femoral pulses are 2+ on the right side, and 2+ on the left side. Popliteal pulses are 2+ on the right side, and 2+ on the left side. Pulmonary/Chest: Effort normal and breath sounds normal. No stridor. No respiratory distress. She has no wheezes. She has no rales. She exhibits no tenderness. Abdominal: Soft. Bowel sounds are normal. She exhibits no distension and no mass. There is no tenderness. There is no rebound, no guarding and no CVA tenderness. No hernia. Hernia confirmed negative in the ventral area, confirmed negative in the right inguinal area and confirmed negative in the left inguinal area. Musculoskeletal: She exhibits edema and tenderness. Right lower leg: She exhibits tenderness and swelling. She exhibits no bony tenderness, no deformity and no laceration. Legs:  Bruising to medial malleolus    No cyanosis    Strong femoral and popliteal pulse   Neurological: She is alert and oriented to person, place, and time. She displays normal reflexes. No cranial nerve deficit. She exhibits normal muscle tone. Coordination normal.   Skin: Skin is warm. No rash noted. She is not diaphoretic. No pallor. Nursing note and vitals reviewed.        MDM  Number of Diagnoses or Management Options  Leg pain, diffuse, right:   Diagnosis management comments: DDx: DVT, hematoma, cellulitis, compartment syndrome, arterial occlusion  Impression/Plan: Pt two weeks ago dx'd with PE and was admitted. On Coumadin and Lovenox as they have had difficulty controlling her Coumadin. Returned to ER for 2nd visit c/o of leg pain. No compartment signs and does have pulses. repeat doppler unremarkable. Discussed with vascular surgery who recommends admission for pain control, who will see and consult. Amount and/or Complexity of Data Reviewed  Clinical lab tests: ordered and reviewed  Tests in the radiology section of CPT®: ordered and reviewed  Obtain history from someone other than the patient: yes (Medical records)  Review and summarize past medical records: yes  Discuss the patient with other providers: yes (Vascular Surgery, hospitalist)    Risk of Complications, Morbidity, and/or Mortality  Presenting problems: moderate  Diagnostic procedures: moderate  Management options: moderate    Patient Progress  Patient progress: stable    ED Course       Procedures    Progress Note:  5:08 PM  The patient has been re-evaluated and her pain continues to persist. Unable to establish a line on the patient. Written by ANDREW Reese, as dictated by Edith Fox MD.    Consult Note:  5:29 PM  Edith Fox MD spoke with Leta Joshi MD,  Specialty: Vascular Surgery  Discussed pt's hx, disposition, and available diagnostic and imaging results. Reviewed care plans. Consultant agrees with plans as outlined. Dr. Ezra Garcia recommends admission to the hospitalist for pain control. Dr. Ezra Garcia will review the CT for signs of venous clots and has high suspicion that there is a pelvis clot. Dr. Ezra Garcia will call me back. Written by ANDREW Reese, as dictated by Edith Fox MD.    Progress Note:  5:40 PM  The patient was updated on the plan of care. She is in agreement with place a peripheral IV.   Written by ANDREW Reese, as dictated by Gladis Escoto MD.    CONSULT NOTE:   5:50 PM  Gladis Escoto MD spoke with Kathleen Soto MD,   Specialty: Hospitalist  Discussed pt's hx, disposition, and available diagnostic and imaging results. Reviewed care plans. Consultant will evaluate pt for admission. Written by Shay Amado, ED Scribe, as dictated by Gladis Escoto MD.    Procedure Note- Peripheral IV Access  6:11 PM  Performed by: MD Shay Hernandez MD gained IV access using  20 gauge needle because the patient had no vascular access. After cleaning the site with alcohol prep, the Left AC vein was localized with ultrasound guidance in an anterior approach. Line confirmation was obtained by direct visualization and good blood return. No anaesthetic was used. The line was successfully flushed with normal saline and was secured with transparent tape. Estimated blood loss: < 1 cc  The procedure took 1-15 minutes, and pt tolerated well. Written by Shay Amado, ED Scribe, as dictated by Shay Kebede MD.    LABORATORY TESTS:  Recent Results (from the past 12 hour(s))   CBC WITH AUTOMATED DIFF    Collection Time: 03/23/17  3:55 PM   Result Value Ref Range    WBC 8.2 3.6 - 11.0 K/uL    RBC 4.90 3.80 - 5.20 M/uL    HGB 13.9 11.5 - 16.0 g/dL    HCT 43.2 35.0 - 47.0 %    MCV 88.2 80.0 - 99.0 FL    MCH 28.4 26.0 - 34.0 PG    MCHC 32.2 30.0 - 36.5 g/dL    RDW 13.7 11.5 - 14.5 %    PLATELET 750 162 - 343 K/uL    NEUTROPHILS 60 32 - 75 %    LYMPHOCYTES 28 12 - 49 %    MONOCYTES 8 5 - 13 %    EOSINOPHILS 3 0 - 7 %    BASOPHILS 1 0 - 1 %    ABS. NEUTROPHILS 4.9 1.8 - 8.0 K/UL    ABS. LYMPHOCYTES 2.3 0.8 - 3.5 K/UL    ABS. MONOCYTES 0.7 0.0 - 1.0 K/UL    ABS. EOSINOPHILS 0.3 0.0 - 0.4 K/UL    ABS.  BASOPHILS 0.1 0.0 - 0.1 K/UL   METABOLIC PANEL, COMPREHENSIVE    Collection Time: 03/23/17  3:55 PM   Result Value Ref Range    Sodium 140 136 - 145 mmol/L    Potassium 3.9 3.5 - 5.1 mmol/L    Chloride 103 97 - 108 mmol/L    CO2 28 21 - 32 mmol/L    Anion gap 9 5 - 15 mmol/L    Glucose 91 65 - 100 mg/dL    BUN 7 6 - 20 MG/DL    Creatinine 0.69 0.55 - 1.02 MG/DL    BUN/Creatinine ratio 10 (L) 12 - 20      GFR est AA >60 >60 ml/min/1.73m2    GFR est non-AA >60 >60 ml/min/1.73m2    Calcium 9.2 8.5 - 10.1 MG/DL    Bilirubin, total 0.8 0.2 - 1.0 MG/DL    ALT (SGPT) 46 12 - 78 U/L    AST (SGOT) 44 (H) 15 - 37 U/L    Alk. phosphatase 101 45 - 117 U/L    Protein, total 7.2 6.4 - 8.2 g/dL    Albumin 3.5 3.5 - 5.0 g/dL    Globulin 3.7 2.0 - 4.0 g/dL    A-G Ratio 0.9 (L) 1.1 - 2.2     PTT    Collection Time: 03/23/17  3:55 PM   Result Value Ref Range    aPTT 44.8 (H) 22.1 - 32.5 sec    aPTT, therapeutic range     58.0 - 77.0 SECS   PROTHROMBIN TIME + INR    Collection Time: 03/23/17  3:55 PM   Result Value Ref Range    INR 2.3 (H) 0.9 - 1.1      Prothrombin time 24.3 (H) 9.0 - 11.1 sec       IMAGING RESULTS:  INDICATION: Right Leg swelling and pain for one week, ankle discoloration. DVT  suspected     COMPARISON: 3/17/2017     FINDINGS: Duplex Doppler sonography of the right lower extremity was performed  from the groin to the calf. The right common femoral, femoral and popliteal  veins are compressible with normal color-flow and wave forms and response to  physiologic maneuvers including Valsalva and augmentation. Evaluation of the  peroneal and posterior tibial veins are compromised by calf swelling.     IMPRESSION  IMPRESSION: No deep venous thrombosis identified.                 MEDICATIONS GIVEN:  Medications   sodium chloride (NS) flush 5-10 mL (not administered)   sodium chloride (NS) flush 5-10 mL (not administered)   HYDROmorphone (PF) (DILAUDID) injection 1 mg (1 mg IntraVENous Given 3/23/17 1550)   ondansetron (ZOFRAN) injection 4 mg (4 mg IntraVENous Given 3/23/17 1550)   HYDROmorphone (PF) (DILAUDID) injection 1 mg (1 mg IntraMUSCular Given 3/23/17 3253)       IMPRESSION:  1. Leg pain, diffuse, right        PLAN:  1.  Admit to [address] Note:  5:51 PM  Pt is being admitted by Porfirio Evangelista MD. The results of their tests and reason(s) for their admission have been discussed with pt and/or available family. They convey agreement and understanding for the need to be admitted and for admission diagnosis. This note is prepared by Randy Cisneros, acting as a Scribe for Suma Rodríguez MD.    Suma Rodríguez MD: The scribe's documentation has been prepared under my direction and personally reviewed by me in its entirety. I confirm that the notes above accurately reflects all work, treatment, procedures, and medical decision making performed by me.

## 2017-03-24 ENCOUNTER — APPOINTMENT (OUTPATIENT)
Dept: MRI IMAGING | Age: 57
DRG: 813 | End: 2017-03-24
Attending: SURGERY
Payer: COMMERCIAL

## 2017-03-24 LAB
ANION GAP BLD CALC-SCNC: 11 MMOL/L (ref 5–15)
BASOPHILS # BLD AUTO: 0 K/UL (ref 0–0.1)
BASOPHILS # BLD: 1 % (ref 0–1)
BUN SERPL-MCNC: 6 MG/DL (ref 6–20)
BUN/CREAT SERPL: 11 (ref 12–20)
CALCIUM SERPL-MCNC: 8.8 MG/DL (ref 8.5–10.1)
CHLORIDE SERPL-SCNC: 106 MMOL/L (ref 97–108)
CO2 SERPL-SCNC: 24 MMOL/L (ref 21–32)
CREAT SERPL-MCNC: 0.57 MG/DL (ref 0.55–1.02)
EOSINOPHIL # BLD: 0.4 K/UL (ref 0–0.4)
EOSINOPHIL NFR BLD: 6 % (ref 0–7)
ERYTHROCYTE [DISTWIDTH] IN BLOOD BY AUTOMATED COUNT: 13.6 % (ref 11.5–14.5)
GLUCOSE SERPL-MCNC: 101 MG/DL (ref 65–100)
HCT VFR BLD AUTO: 37.4 % (ref 35–47)
HGB BLD-MCNC: 12.2 G/DL (ref 11.5–16)
INR PPP: 1.8 (ref 0.9–1.1)
LYMPHOCYTES # BLD AUTO: 22 % (ref 12–49)
LYMPHOCYTES # BLD: 1.5 K/UL (ref 0.8–3.5)
MCH RBC QN AUTO: 28.9 PG (ref 26–34)
MCHC RBC AUTO-ENTMCNC: 32.6 G/DL (ref 30–36.5)
MCV RBC AUTO: 88.6 FL (ref 80–99)
MONOCYTES # BLD: 0.7 K/UL (ref 0–1)
MONOCYTES NFR BLD AUTO: 10 % (ref 5–13)
NEUTS SEG # BLD: 4.2 K/UL (ref 1.8–8)
NEUTS SEG NFR BLD AUTO: 61 % (ref 32–75)
PLATELET # BLD AUTO: 347 K/UL (ref 150–400)
POTASSIUM SERPL-SCNC: 4.2 MMOL/L (ref 3.5–5.1)
PROTHROMBIN TIME: 18.8 SEC (ref 9–11.1)
RBC # BLD AUTO: 4.22 M/UL (ref 3.8–5.2)
SODIUM SERPL-SCNC: 141 MMOL/L (ref 136–145)
WBC # BLD AUTO: 6.8 K/UL (ref 3.6–11)

## 2017-03-24 PROCEDURE — G8978 MOBILITY CURRENT STATUS: HCPCS

## 2017-03-24 PROCEDURE — A9585 GADOBUTROL INJECTION: HCPCS | Performed by: INTERNAL MEDICINE

## 2017-03-24 PROCEDURE — 65270000029 HC RM PRIVATE

## 2017-03-24 PROCEDURE — 85610 PROTHROMBIN TIME: CPT | Performed by: EMERGENCY MEDICINE

## 2017-03-24 PROCEDURE — 36415 COLL VENOUS BLD VENIPUNCTURE: CPT | Performed by: HOSPITALIST

## 2017-03-24 PROCEDURE — 74011250637 HC RX REV CODE- 250/637: Performed by: HOSPITALIST

## 2017-03-24 PROCEDURE — 74011250637 HC RX REV CODE- 250/637: Performed by: INTERNAL MEDICINE

## 2017-03-24 PROCEDURE — 73720 MRI LWR EXTREMITY W/O&W/DYE: CPT

## 2017-03-24 PROCEDURE — 97165 OT EVAL LOW COMPLEX 30 MIN: CPT

## 2017-03-24 PROCEDURE — 85025 COMPLETE CBC W/AUTO DIFF WBC: CPT | Performed by: HOSPITALIST

## 2017-03-24 PROCEDURE — 97161 PT EVAL LOW COMPLEX 20 MIN: CPT

## 2017-03-24 PROCEDURE — 80048 BASIC METABOLIC PNL TOTAL CA: CPT | Performed by: HOSPITALIST

## 2017-03-24 PROCEDURE — G8979 MOBILITY GOAL STATUS: HCPCS

## 2017-03-24 PROCEDURE — 97162 PT EVAL MOD COMPLEX 30 MIN: CPT

## 2017-03-24 PROCEDURE — G8987 SELF CARE CURRENT STATUS: HCPCS

## 2017-03-24 PROCEDURE — 74011250636 HC RX REV CODE- 250/636: Performed by: INTERNAL MEDICINE

## 2017-03-24 PROCEDURE — 74011250636 HC RX REV CODE- 250/636: Performed by: HOSPITALIST

## 2017-03-24 PROCEDURE — 77030029684 HC NEB SM VOL KT MONA -A

## 2017-03-24 RX ORDER — GABAPENTIN 300 MG/1
600 CAPSULE ORAL
COMMUNITY
End: 2017-05-30

## 2017-03-24 RX ORDER — HYDROCODONE BITARTRATE AND ACETAMINOPHEN 5; 325 MG/1; MG/1
1 TABLET ORAL
Status: DISCONTINUED | OUTPATIENT
Start: 2017-03-24 | End: 2017-03-28 | Stop reason: HOSPADM

## 2017-03-24 RX ORDER — HYDROMORPHONE HYDROCHLORIDE 1 MG/ML
1 INJECTION, SOLUTION INTRAMUSCULAR; INTRAVENOUS; SUBCUTANEOUS
Status: DISCONTINUED | OUTPATIENT
Start: 2017-03-24 | End: 2017-03-25

## 2017-03-24 RX ORDER — IBUPROFEN 200 MG
200 TABLET ORAL
COMMUNITY
End: 2017-03-28

## 2017-03-24 RX ORDER — CALCIUM CARBONATE 200(500)MG
1 TABLET,CHEWABLE ORAL DAILY
COMMUNITY
End: 2017-03-30 | Stop reason: DRUGHIGH

## 2017-03-24 RX ORDER — WARFARIN 10 MG/1
10 TABLET ORAL
COMMUNITY
End: 2017-03-28

## 2017-03-24 RX ORDER — WARFARIN 10 MG/1
10 TABLET ORAL DAILY
Status: ON HOLD | COMMUNITY
End: 2017-03-24

## 2017-03-24 RX ORDER — IPRATROPIUM BROMIDE AND ALBUTEROL SULFATE 2.5; .5 MG/3ML; MG/3ML
3 SOLUTION RESPIRATORY (INHALATION)
Status: DISCONTINUED | OUTPATIENT
Start: 2017-03-24 | End: 2017-03-28 | Stop reason: HOSPADM

## 2017-03-24 RX ORDER — WARFARIN SODIUM 5 MG/1
10 TABLET ORAL ONCE
Status: COMPLETED | OUTPATIENT
Start: 2017-03-24 | End: 2017-03-24

## 2017-03-24 RX ORDER — LANOLIN ALCOHOL/MO/W.PET/CERES
1000 CREAM (GRAM) TOPICAL DAILY
COMMUNITY

## 2017-03-24 RX ORDER — PREDNISONE 20 MG/1
20 TABLET ORAL AS NEEDED
COMMUNITY
End: 2017-03-28

## 2017-03-24 RX ORDER — VENLAFAXINE HYDROCHLORIDE 150 MG/1
150 CAPSULE, EXTENDED RELEASE ORAL
Status: DISCONTINUED | OUTPATIENT
Start: 2017-03-24 | End: 2017-03-28 | Stop reason: HOSPADM

## 2017-03-24 RX ORDER — WARFARIN SODIUM 5 MG/1
5 TABLET ORAL
COMMUNITY
End: 2017-03-28

## 2017-03-24 RX ORDER — PHENAZOPYRIDINE HYDROCHLORIDE 200 MG/1
200 TABLET, FILM COATED ORAL
COMMUNITY
End: 2017-03-28

## 2017-03-24 RX ORDER — GABAPENTIN 300 MG/1
300 CAPSULE ORAL 2 TIMES DAILY
COMMUNITY
End: 2017-05-30

## 2017-03-24 RX ORDER — ALBUTEROL SULFATE 0.83 MG/ML
SOLUTION RESPIRATORY (INHALATION) ONCE
COMMUNITY

## 2017-03-24 RX ORDER — VITAMIN A 3000 MCG
10000 CAPSULE ORAL DAILY
COMMUNITY

## 2017-03-24 RX ORDER — VENLAFAXINE HYDROCHLORIDE 150 MG/1
150 CAPSULE, EXTENDED RELEASE ORAL DAILY
COMMUNITY
End: 2017-08-04 | Stop reason: SDUPTHER

## 2017-03-24 RX ADMIN — GABAPENTIN 300 MG: 300 CAPSULE ORAL at 08:52

## 2017-03-24 RX ADMIN — ONDANSETRON HYDROCHLORIDE 4 MG: 2 INJECTION, SOLUTION INTRAMUSCULAR; INTRAVENOUS at 10:14

## 2017-03-24 RX ADMIN — HYDROCODONE BITARTRATE AND ACETAMINOPHEN 1 TABLET: 5; 325 TABLET ORAL at 22:31

## 2017-03-24 RX ADMIN — PANTOPRAZOLE SODIUM 40 MG: 40 TABLET, DELAYED RELEASE ORAL at 08:52

## 2017-03-24 RX ADMIN — GABAPENTIN 300 MG: 300 CAPSULE ORAL at 18:20

## 2017-03-24 RX ADMIN — WARFARIN SODIUM 5 MG: 5 TABLET ORAL at 01:52

## 2017-03-24 RX ADMIN — HYDROCODONE BITARTRATE AND ACETAMINOPHEN 1 TABLET: 5; 325 TABLET ORAL at 12:57

## 2017-03-24 RX ADMIN — VENLAFAXINE HYDROCHLORIDE 150 MG: 150 CAPSULE, EXTENDED RELEASE ORAL at 09:59

## 2017-03-24 RX ADMIN — CEFAZOLIN 2 G: 10 INJECTION, POWDER, FOR SOLUTION INTRAVENOUS; PARENTERAL at 06:11

## 2017-03-24 RX ADMIN — ZOLPIDEM TARTRATE 5 MG: 5 TABLET ORAL at 01:52

## 2017-03-24 RX ADMIN — ONDANSETRON HYDROCHLORIDE 4 MG: 2 INJECTION, SOLUTION INTRAMUSCULAR; INTRAVENOUS at 05:17

## 2017-03-24 RX ADMIN — Medication 10 ML: at 14:16

## 2017-03-24 RX ADMIN — GADOBUTROL 10 ML: 604.72 INJECTION INTRAVENOUS at 21:07

## 2017-03-24 RX ADMIN — ZOLPIDEM TARTRATE 5 MG: 5 TABLET ORAL at 22:31

## 2017-03-24 RX ADMIN — CEFAZOLIN 2 G: 10 INJECTION, POWDER, FOR SOLUTION INTRAVENOUS; PARENTERAL at 14:15

## 2017-03-24 RX ADMIN — HYDROMORPHONE HYDROCHLORIDE 1 MG: 1 INJECTION, SOLUTION INTRAMUSCULAR; INTRAVENOUS; SUBCUTANEOUS at 20:03

## 2017-03-24 RX ADMIN — FLUTICASONE FUROATE AND VILANTEROL TRIFENATATE 1 PUFF: 200; 25 POWDER RESPIRATORY (INHALATION) at 10:00

## 2017-03-24 RX ADMIN — HYDROCODONE BITARTRATE AND ACETAMINOPHEN 1 TABLET: 5; 325 TABLET ORAL at 18:20

## 2017-03-24 RX ADMIN — Medication 2 MG: at 05:08

## 2017-03-24 RX ADMIN — HYDROMORPHONE HYDROCHLORIDE 1 MG: 1 INJECTION, SOLUTION INTRAMUSCULAR; INTRAVENOUS; SUBCUTANEOUS at 09:56

## 2017-03-24 RX ADMIN — Medication 2 MG: at 01:37

## 2017-03-24 RX ADMIN — Medication 10 ML: at 22:25

## 2017-03-24 RX ADMIN — WARFARIN SODIUM 10 MG: 5 TABLET ORAL at 18:20

## 2017-03-24 RX ADMIN — HYDROMORPHONE HYDROCHLORIDE 1 MG: 1 INJECTION, SOLUTION INTRAMUSCULAR; INTRAVENOUS; SUBCUTANEOUS at 14:16

## 2017-03-24 RX ADMIN — Medication 10 ML: at 05:12

## 2017-03-24 NOTE — PROGRESS NOTES
Pharmacy Medication Reconciliation     The patient was interviewed regarding current PTA medication list, use and drug allergies;  patient and family present in room and obtained permission from patient to discuss drug regimen with visitor(s) present. The patient was questioned regarding use of any other inhalers, topical products, over the counter medications, herbal medications, vitamin products or ophthalmic/nasal/otic medication use. Allergy Update: Penicillins, Griseofulvin    Recommendations/Findings: The following amendments were made to the patient's active medication list on file at [facility]:   1) Additions:   Prednisone 20 mg prn for breathing  Tums 1 tablet daily  Phenazopyridine 200 mg prn  Ibuprofen 200 mg prn  OTC Vitamin A tablet daily  OTC Vitamin B12 daily  Gabapentin 300 mg 2 capsules at HS (in addition to AM and PM doses)    2) Deletions:  Hydrocodone/APAP 7.5/325 1 tab q6 prn  Tramadol allergy was deleted b/c pt says she doesn't have an allergy, she simply can't take with Effexor. 3) Changes:    -Clarified PTA med list with patient. PTA medication list was corrected to the following:   Prior to Admission Medications   Prescriptions Last Dose Informant Patient Reported? Taking? ALPRAZolam (XANAX) 0.5 mg tablet 3/22/2017 at Unknown time  No Yes   Sig: Take 1 Tab by mouth nightly as needed for Anxiety. Max Daily Amount: 0.5 mg. DULoxetine (CYMBALTA) 30 mg capsule Not Taking at Unknown time  No No   Sig: Take 3 Caps by mouth daily. bedtime   HYDROcodone-acetaminophen (NORCO) 5-325 mg per tablet 3/17/2017 at Unknown time  No Yes   Sig: Take 1 Tab by mouth every six (6) hours as needed for Pain. Max Daily Amount: 4 Tabs. albuterol (PROAIR HFA) 90 mcg/actuation inhaler 3/17/2017 at Unknown time  No Yes   Sig: Take 2 Puffs by inhalation every four (4) hours as needed for Wheezing.    albuterol (PROVENTIL VENTOLIN) 2.5 mg /3 mL (0.083 %) nebulizer solution   Yes Yes   Sig: by Nebulization route once.   albuterol-ipratropium (DUO-NEB) 2.5 mg-0.5 mg/3 ml nebulizer solution 3/17/2017 at Unknown time  No Yes   Sig: 3 mL by Nebulization route every four (4) hours. Indications: CHRONIC OBSTRUCTIVE PULMONARY DISEASE WITH BRONCHOSPASMS   calcium carbonate (TUMS) 200 mg calcium (500 mg) chew   Yes Yes   Sig: Take 1 Tab by mouth daily. cyanocobalamin (VITAMIN B-12) 1,000 mcg tablet   Yes Yes   Sig: Take 1,000 mcg by mouth daily. cyclobenzaprine (FLEXERIL) 10 mg tablet 3/17/2017 at Unknown time  No Yes   Sig: Take 1 Tab by mouth three (3) times daily as needed for Muscle Spasm(s).   enoxaparin (LOVENOX) 120 mg/0.8 mL injection 3/23/2017 at Unknown time  No Yes   Si mg by SubCUTAneous route every twelve (12) hours for 7 days. ergocalciferol (ERGOCALCIFEROL) 50,000 unit capsule 3/17/2017 at Unknown time  No Yes   Sig: Take 1 Cap by mouth every seven (7) days. esomeprazole (NEXIUM) 40 mg capsule 3/23/2017 at Unknown time  No Yes   Sig: Take 1 Cap by mouth daily. ferrous sulfate (SLOW FE) 142 mg (45 mg iron) ER tablet   Yes Yes   Sig: Take 45 mg by mouth Daily (before breakfast). gabapentin (NEURONTIN) 300 mg capsule   Yes Yes   Sig: Take 300 mg by mouth two (2) times a day.   gabapentin (NEURONTIN) 300 mg capsule   Yes Yes   Sig: Take 600 mg by mouth nightly. ibuprofen (MOTRIN) 200 mg tablet   Yes Yes   Sig: Take 200 mg by mouth every six (6) hours as needed for Pain.   inhalational spacing device 3/23/2017 at Unknown time  No Yes   Si Each by Does Not Apply route as needed. mometasone-formoterol (DULERA) 200-5 mcg/actuation HFA inhaler 3/23/2017 at Unknown time  No Yes   Sig: Take 2 Puffs by inhalation two (2) times a day. nortriptyline (PAMELOR) 25 mg capsule 3/17/2017 at Unknown time  Yes Yes   Sig: take 1 to 2 tablets by mouth at bedtime if needed   phenazopyridine (PYRIDIUM) 200 mg tablet   Yes Yes   Sig: Take 200 mg by mouth three (3) times daily as needed for Pain.    predniSONE (DELTASONE) 20 mg tablet   Yes Yes   Sig: Take 20 mg by mouth as needed. sucralfate (CARAFATE) 100 mg/mL suspension 3/17/2017 at Unknown time  No Yes   Sig: Take 5 mL by mouth four (4) times daily. venlafaxine-SR (EFFEXOR XR) 150 mg capsule 3/22/2017 at Unknown time  Yes Yes   Sig: Take 150 mg by mouth daily. vitamin A (AQUASOL A) 10,000 unit capsule   Yes Yes   Sig: Take 10,000 Units by mouth daily. warfarin (COUMADIN) 10 mg tablet   Yes Yes   Sig: Take 10 mg by mouth five (5) days a week. 10 mg Monday through Friday   warfarin (COUMADIN) 5 mg tablet   Yes Yes   Sig: Take 5 mg by mouth two (2) times daily on Sat & Sun. 5 mg Saturday and Sunday   zolpidem (AMBIEN) 5 mg tablet 3/17/2017 at Unknown time  No Yes   Sig: Take 1 Tab by mouth nightly as needed for Sleep. Max Daily Amount: 5 mg. Spoke with patient with family present in the afternoon. She had a medication list. Patient claims she is being titrated off the Effexor but her doctor hasn't told her how.       Thank you,  Denny House

## 2017-03-24 NOTE — ED NOTES
TRANSFER - OUT REPORT:    Verbal report given to Isabel RN(name) on Shell Bravo  being transferred to [facility](unit) for routine progression of care       Report consisted of patients Situation, Background, Assessment and   Recommendations(SBAR). Information from the following report(s) SBAR, Kardex, ED Summary and MAR was reviewed with the receiving nurse. Lines:   Peripheral IV 03/23/17 Right Forearm (Active)   Site Assessment Clean, dry, & intact 3/23/2017  3:22 PM   Phlebitis Assessment 0 3/23/2017  3:22 PM   Infiltration Assessment 0 3/23/2017  3:22 PM   Dressing Status Clean, dry, & intact 3/23/2017  3:22 PM   Dressing Type Transparent 3/23/2017  3:22 PM   Hub Color/Line Status Blue 3/23/2017  3:22 PM        Opportunity for questions and clarification was provided.       Patient transported with:   [facility]

## 2017-03-24 NOTE — CONSULTS
Brief Vascular Surgery  Consult Note    Impression:  Right lower leg pain and swelling  I doubt this is due to occult DVT  Most likely she had a minor intramuscular bleed due to lovenox and coumadin    Plan:  Ordered MRI of lower leg  Would cont coumadin  Hold off on lovenox even though INR 1.8  No need for IVC filter at this time  Following    Full note dictated    Sarbjit Caba MD

## 2017-03-24 NOTE — ED NOTES
Hospitalist MD speaking with patient regarding at home coumadin dose at this time.  Order modifications to follow

## 2017-03-24 NOTE — PROGRESS NOTES
Problem: Mobility Impaired (Adult and Pediatric)  Goal: *Acute Goals and Plan of Care (Insert Text)  Physical Therapy Goals  Initiated 3/24/2017  1. Patient will move from supine to sit and sit to supine in bed with independence within 7 day(s). 2. Patient will transfer from bed to chair and chair to bed with independence using the least restrictive device within 7 day(s). 3. Patient will perform sit to stand with independence within 7 day(s). 4. Patient will ambulate with modified independence for 200 feet with the least restrictive device within 7 day(s). 5. Patient will ascend/descend 5 stairs with B handrail(s) with modified independence within 7 day(s). PHYSICAL THERAPY EVALUATION  Patient: Tisha Mina (2 y.o. female)  Date: 3/24/2017  Primary Diagnosis: Pain and swelling of right lower leg        Precautions:   Fall      ASSESSMENT :  Based on the objective data described below, the patient presents with generalized weakness, decreased activity tolerance, increased pain, impaired balance and altered gait. PTA pt was living home alone and walking with a SPC. She reports that she has had several falls in the past year. She was received coming out of the bathroom with OT. She sat down and needed to rest. She agreed to ambulate to the door of her room and then back to the chair. Pain is a very limiting factory at this time. She was tearful when returning to the chair. Noted increased trunk sway and antalgic steps. She was left sitting up in the chair at the end of the session. Recommending return to home with HHPT and assistance vs rehab per pt progress and pain control. Patient will benefit from skilled intervention to address the above impairments.   Patients rehabilitation potential is considered to be Good  Factors which may influence rehabilitation potential include:   [ ]         None noted  [ ]         Mental ability/status  [ ]         Medical condition  [X]         Home/family situation and support systems  [ ]         Safety awareness  [X]         Pain tolerance/management  [ ]         Other:        PLAN :  Recommendations and Planned Interventions:  [X]           Bed Mobility Training             [ ]    Neuromuscular Re-Education  [X]           Transfer Training                   [ ]    Orthotic/Prosthetic Training  [X]           Gait Training                         [ ]    Modalities  [X]           Therapeutic Exercises           [ ]    Edema Management/Control  [X]           Therapeutic Activities            [X]    Patient and Family Training/Education  [ ]           Other (comment):     Frequency/Duration: Patient will be followed by physical therapy  4 times a week to address goals. Discharge Recommendations: Home Health with assistance vs rehab if cannot get support  Further Equipment Recommendations for Discharge: rolling walker       SUBJECTIVE:   Patient stated it just hurts so bad.       OBJECTIVE DATA SUMMARY:   HISTORY:    Past Medical History:   Diagnosis Date    Anemia 3/15/2013    Arrhythmia       paroxsimal afib    Asthma 3/15/2013    Asthma      Back disorder       disc problem    Chronic obstructive pulmonary disease (HCC)      Morbid obesity ([facility] [address] 3/15/2013     Past Surgical History:   Procedure Laterality Date    HX ADENOIDECTOMY        HX APPENDECTOMY        HX GASTRIC BYPASS   1-15-98     Dr. Josiah Shipman    HX GASTRIC BYPASS        [address]        HX ORTHOPAEDIC         arm fracture    HX TONSILLECTOMY        HX TUBAL LIGATION   80    HX TUBAL LIGATION         Prior Level of Function/Home Situation: pt was living alone and using [facility]. She does report several falls at home.   Personal factors and/or comorbidities impacting plan of care:      Home Situation  Home Environment: Private residence  # Steps to Enter: 4  Rails to Enter: Yes  One/Two Story Residence: One story  Living Alone: Yes  Current DME Used/Available at Home: Cane, straight, Grab bars  Tub or Shower Type: Tub/Shower combination     EXAMINATION/PRESENTATION/DECISION MAKING:   Critical Behavior:   alert and oriented x 4           Hearing: Auditory  Auditory Impairment: None  Skin:  Bruising on LEs  Edema: RLE increased edema  Range Of Motion:  AROM: Within functional limits           PROM: Within functional limits           Strength:    Strength: Generally decreased, functional                    Tone & Sensation:   Tone: Normal              Sensation: Impaired (tingling R heel)               Coordination:  Coordination: Within functional limits  Vision:      Functional Mobility:  Bed Mobility:   session began and ended in sitting           Transfers:  Sit to Stand: Stand-by asssistance  Stand to Sit: Stand-by asssistance                       Balance:   Sitting: Intact  Standing: Impaired  Standing - Static: Good;Constant support  Standing - Dynamic : Fair  Ambulation/Gait Training:  Distance (ft): 20 Feet (ft)  Assistive Device: Walker, rolling  Ambulation - Level of Assistance: Contact guard assistance;Stand-by asssistance  Gait Abnormalities: Antalgic;Decreased step clearance  Base of Support: Widened  Speed/Esther: Pace decreased (<100 feet/min)  Step Length: Left shortened;Right shortened              Functional Measure:  Barthel Index:      Bathin  Bladder: 10  Bowels: 10  Groomin  Dressin  Feeding: 10  Mobility: 0  Stairs: 0  Toilet Use: 5  Transfer (Bed to Chair and Back): 10  Total: 55         Barthel and G-code impairment scale:  Percentage of impairment CH  0% CI  1-19% CJ  20-39% CK  40-59% CL  60-79% CM  80-99% CN  100%   Barthel Score 0-100 100 99-80 79-60 59-40 20-39 1-19    0   Barthel Score 0-20 20 17-19 13-16 9-12 5-8 1-4 0      The Barthel ADL Index: Guidelines  1. The index should be used as a record of what a patient does, not as a record of what a patient could do.   2. The main aim is to establish degree of independence from any help, physical or verbal, however minor and for whatever reason. 3. The need for supervision renders the patient not independent. 4. A patient's performance should be established using the best available evidence. Asking the patient, friends/relatives and nurses are the usual sources, but direct observation and common sense are also important. However direct testing is not needed. 5. Usually the patient's performance over the preceding 24-48 hours is important, but occasionally longer periods will be relevant. 6. Middle categories imply that the patient supplies over 50 per cent of the effort. 7. Use of aids to be independent is allowed. Freeman Bower., Barthel, DAbdoulW. (5445). Functional evaluation: the Barthel Index. [address] [facility] (14)2. Francia Hernandez remington ROWENA Moura, Romeo Pinto., Jerri Rowan, [place], [address] [facility] (1999). Measuring the change indisability after inpatient rehabilitation; comparison of the responsiveness of the Barthel Index and Functional Addison Measure. Journal of Neurology, Neurosurgery, and Psychiatry, 66(4), 832-279. Westley Fairchild, N.J.A, RIKY Saeed, & Gayle Clayton MHANNAH. (2004.) Assessment of post-stroke quality of life in cost-effectiveness studies: The usefulness of the Barthel Index and the EuroQoL-5D. Quality of Life Research, 13, 267-01            G codes: In compliance with CMSs Claims Based Outcome Reporting, the following G-code set was chosen for this patient based on their primary functional limitation being treated: The outcome measure chosen to determine the severity of the functional limitation was the barthel with a score of 55/100 which was correlated with the impairment scale.       · Mobility - Walking and Moving Around:               - CURRENT STATUS:    CK - 40%-59% impaired, limited or restricted               - GOAL STATUS:           CJ - 20%-39% impaired, limited or restricted               - D/C STATUS:                       ---------------To be determined--------------- Physical Therapy Evaluation Charge Determination   History Examination Presentation Decision-Making   MEDIUM  Complexity : 1-2 comorbidities / personal factors will impact the outcome/ POC  MEDIUM Complexity : 3 Standardized tests and measures addressing body structure, function, activity limitation and / or participation in recreation  MEDIUM Complexity : Evolving with changing characteristics  MEDIUM Complexity : FOTO score of 26-74      Based on the above components, the patient evaluation is determined to be of the following complexity level: MEDIUM     Pain:  Pain Scale 1: Numeric (0 - 10)  Pain Intensity 1: 7  Pain Location 1: Leg  Pain Orientation 1: Right  Pain Description 1: Aching  Pain Intervention(s) 1: Medication (see MAR); Rest  Activity Tolerance:   painful  Please refer to the flowsheet for vital signs taken during this treatment. After treatment:   [X]         Patient left in no apparent distress sitting up in chair  [ ]         Patient left in no apparent distress in bed  [X]         Call bell left within reach  [X]         Nursing notified  [ ]         Caregiver present  [ ]         Bed alarm activated      COMMUNICATION/EDUCATION:   The patients plan of care was discussed with: Occupational Therapist and Registered Nurse.  [X]         Fall prevention education was provided and the patient/caregiver indicated understanding. [ ]         Patient/family have participated as able in goal setting and plan of care. [X]         Patient/family agree to work toward stated goals and plan of care. [ ]         Patient understands intent and goals of therapy, but is neutral about his/her participation. [ ]         Patient is unable to participate in goal setting and plan of care.      Thank you for this referral.  Sandee Huffman, PT, DPT   Time Calculation: 14 mins

## 2017-03-24 NOTE — PROGRESS NOTES
Spiritual Care Partner Volunteer visited patient in Ortho on 3/24/17.   Documented by:  VALENTINA Leslie, [facility], [address] [place] [address] [address]     Paging Service  [phone])

## 2017-03-24 NOTE — PROGRESS NOTES
Hospitalist Progress Note    NAME: Jayashree Hamilton   :  1960   MRN:  [id number]       Interim Hospital Summary: 61 y.o. female whom presented on 3/23/2017 with      Assessment / Plan:  Right legt swelling, pain, erythema: POA  2nd to Rt LE Subcutaneous Ecchymoses doubt a hematoma  Doubt that there is a pocket of Blood but rather slow seepage inbetween the tissues. She has multiple varicose veins superficially that having been on double anticoagulant Coumadin and lovenox would have easily caused a Heavy ecchymoses  Doppler is negative for DVT ( third Doppler in a month)  Discontinued Ancef  -prn pain medication, Norco and IV dilaudid  -keep leg elevated  -vascular Dr. Eren Singh already on board, MRI of LE  Continue Coumadin but No Lovenox tonight     Recent H/O PE: POA  INR therapeutic 2.3, will consult pharmacy to dose it     Asthma/COPD: POA  not wheezing, continue Duonebs prn.     Anxiety/Depression: POA  resume home regimen     Fibromyalgia: POA  Restart home medication     GERD: POA  Resume PPI     Code Status: full  Surrogate Decision Maker: son     DVT Prophylaxis: on coumadin  GI Prophylaxis: PPI  Baseline: lives alone, independent, NURSE at [address] [place] [address] general       Subjective:     Chief Complaint / Reason for Physician Visit  Complains of Pain mainly with Ambulation , Rt foot feels numb with ambulation  Discussed with RN events overnight. Review of Systems:  Symptom Y/N Comments  Symptom Y/N Comments   Fever/Chills n   Chest Pain n    Poor Appetite n   Edema y    Cough n   Abdominal Pain n    Sputum n   Joint Pain n    SOB/TURNER n   Pruritis/Rash     Nausea/vomit n   Tolerating PT/OT     Diarrhea    Tolerating Diet     Constipation    Other       Could NOT obtain due to:      Objective:     VITALS:   Last 24hrs VS reviewed since prior progress note.  Most recent are:  Patient Vitals for the past 24 hrs:   Temp Pulse Resp BP SpO2   17 0739 98.7 °F (37.1 °C) 89 20 142/65 91 %   17 0445 98.6 °F (37 °C) 88 24 132/58 -   03/23/17 2235 98.7 °F (37.1 °C) 91 16 131/61 95 %   03/23/17 2118 - 94 15 - 95 %   03/23/17 2035 - 97 21 124/51 95 %   03/23/17 2000 - 93 14 147/83 92 %   03/23/17 1949 - 95 13 - 94 %   03/23/17 1948 - - - 131/66 -   03/23/17 1406 - 82 - (!) 162/122 96 %   03/23/17 1236 97.8 °F (36.6 °C) 93 18 150/90 96 %     No intake or output data in the 24 hours ending 03/24/17 0810     PHYSICAL EXAM:  General: WD, WN. Alert, cooperative, no acute distress    EENT:  EOMI. Anicteric sclerae. MMM  Resp:  CTA bilaterally, no wheezing or rales. No accessory muscle use  CV:  Regular  rhythm,  No edema  GI:  Soft, Non distended, Non tender.  +Bowel sounds  Neurologic:  Alert and oriented X 3, normal speech,   Psych:   Good insight. Not anxious nor agitated  Skin:  No rashes. No jaundice  EXT:  Has Bilateral  Obese thighs with Very prounounced Varicosities Bilaterally up to thighs            Also multiple Superficial Spider And Serpentine Veins            Rt Leg with a dependent Ecchymoses in the distal 2/3  Down to feet. With swelling           Warm but does  Not appear Cellulitis            Strong bounding dorsal pedis Pulse, good cap refill, warm foot    Reviewed most current lab test results and cultures  YES  Reviewed most current radiology test results   YES  Review and summation of old records today    NO  Reviewed patient's current orders and MAR    YES  PMH/SH reviewed - no change compared to H&P  ________________________________________________________________________  Care Plan discussed with:    Comments   Patient y    Family  y Family Bedside   RN     Care Manager     Consultant  y Vascular Surgeon                     Multidiciplinary team rounds were held today with , nursing, pharmacist and clinical coordinator. Patient's plan of care was discussed; medications were reviewed and discharge planning was addressed. ________________________________________________________________________  Total NON critical care TIME:  30   Minutes    Total CRITICAL CARE TIME Spent:   Minutes non procedure based      Comments   >50% of visit spent in counseling and coordination of care     ________________________________________________________________________  Cristobal Hollingsworth MD     Procedures: see electronic medical records for all procedures/Xrays and details which were not copied into this note but were reviewed prior to creation of Plan. LABS:  I reviewed today's most current labs and imaging studies.   Pertinent labs include:  Recent Labs      03/24/17   0550  03/23/17   1555   WBC  6.8  8.2   HGB  12.2  13.9   HCT  37.4  43.2   PLT  347  363     Recent Labs      03/24/17   0550  03/23/17   1555   NA  141  140   K  4.2  3.9   CL  106  103   CO2  24  28   GLU  101*  91   BUN  6  7   CREA  0.57  0.69   CA  8.8  9.2   ALB   --   3.5   TBILI   --   0.8   SGOT   --   44*   ALT   --   46   INR  1.8*  2.3*       Signed: Cristobal Hollingsworth MD

## 2017-03-24 NOTE — PROGRESS NOTES
Problem: Self Care Deficits Care Plan (Adult)  Goal: *Acute Goals and Plan of Care (Insert Text)  Occupational Therapy Goals  Initiated 3/24/2017  1. Patient will perform grooming with modified independence within 7 day(s). 2. Patient will perform bathing with supervision/set-up within 7 day(s). 3. Patient will perform lower body dressing with supervision/set-up within 7 day(s). 4. Patient will perform toilet transfers with modified independence within 7 day(s). 5. Patient will perform all aspects of toileting with independence within 7 day(s). OCCUPATIONAL THERAPY EVALUATION  Patient: Mike Frank (77 y.o. female)  Date: 3/24/2017  Primary Diagnosis: Pain and swelling of right lower leg        Precautions:   Fall      ASSESSMENT:   Based on the objective data described below, the patient presents with generalized weakness, decreased endurance, increased pain in left leg, decreased mobility, and ADLs. Pt was living at her home independently and was using a SPC for walking but stated that she was having some near miss falls. Pt was in the bathroom when OT arrived and had gotten to the bathroom on her own with RW. Pt was SBA to supervision for transfer to and from toilet and cleaning self. She was able to stand at the sink to wash her hands and then had to rest.  She was able to walk to the door of the room with RW and SBA to supervision. Pt was left in recliner and stated that her pain was 8/10 in her leg and she was reclined the full amount and legs elevated. Recommend that pt return home with assist from family, if available, and RW and further therapy at home will be pending progress. .  . Patient will benefit from skilled intervention to address the above impairments.   Patients rehabilitation potential is considered to be Good  Factors which may influence rehabilitation potential include:   [X]             None noted  [ ]             Mental ability/status  [ ]             Medical condition  [ ] Home/family situation and support systems  [ ]             Safety awareness  [ ]             Pain tolerance/management  [ ]             Other:    PLAN :  Recommendations and Planned Interventions:  [X]               Self Care Training                  [ ]        Therapeutic Activities  [X]               Functional Mobility Training    [ ]        Cognitive Retraining  [X]               Therapeutic Exercises           [X]        Endurance Activities  [ ]               Balance Training                   [ ]        Neuromuscular Re-Education  [ ]               Visual/Perceptual Training     [X]   Home Safety Training  [X]               Patient Education                 [X]        Family Training/Education  [ ]               Other (comment):     Frequency/Duration: Patient will be followed by occupational therapy 3 times a week to address goals. Discharge Recommendations: To Be Determined  Further Equipment Recommendations for Discharge: tbd  SUBJECTIVE:   Patient stated I cant stand or walk much longer with this pain. Annamae Bias      OBJECTIVE DATA SUMMARY:   HISTORY:   Past Medical History:   Diagnosis Date    Anemia 3/15/2013    Arrhythmia       paroxsimal afib    Asthma 3/15/2013    Asthma      Back disorder       disc problem    [facility] [address]      Morbid obesity ([facility] [address] 3/15/2013     Past Surgical History:   Procedure Laterality Date    HX ADENOIDECTOMY        HX APPENDECTOMY        HX GASTRIC BYPASS   1-15-98     Dr. Beverly Pringle    HX GASTRIC BYPASS        [address]        HX ORTHOPAEDIC         arm fracture    HX TONSILLECTOMY        HX TUBAL LIGATION   80    HX TUBAL LIGATION            Prior Level of Function/Home Situation: pt lives alone and was independent in all areas prior and was using [facility] for walking until her admission.   Expanded or extensive additional review of patient history:      Home Situation  Home Environment: Private residence  # Steps to Enter: 4  Rails to Enter: Yes  One/Two Story Residence: One story  Living Alone: Yes  Current DME Used/Available at Home: Cane, straight, Grab bars  Tub or Shower Type: Tub/Shower combination  [X]  Right hand dominant             [ ]  Left hand dominant     EXAMINATION OF PERFORMANCE DEFICITS:  Cognitive/Behavioral Status:  Neurologic State: Alert  Orientation Level: Oriented X4;Appropriate for age  Cognition: Appropriate decision making; Follows commands  Perception: Appears intact  Perseveration: No perseveration noted  Safety/Judgement: Awareness of environment; Fall prevention     Skin: in good health     Edema: swelling noted in right LE     Hearing: Auditory  Auditory Impairment: None     Vision/Perceptual:                 intact                     Range of Motion:     AROM: Within functional limits  PROM: Within functional limits                       Strength:     Strength: Generally decreased, functional                 Coordination:  Coordination: Within functional limits  Fine Motor Skills-Upper: Left Intact; Right Intact    Gross Motor Skills-Upper: Left Intact; Right Intact     Tone & Sensation:     Tone: Normal  Sensation: Impaired (tingling R heel)                       Balance:  Sitting: Intact  Standing: Impaired  Standing - Static: Good;Constant support  Standing - Dynamic : Fair     Functional Mobility and Transfers for ADLs:  Bed Mobility:        Transfers:  Sit to Stand: Stand-by asssistance  Stand to Sit: Stand-by asssistance  Bed to Chair: Stand-by asssistance  Toilet Transfer : Stand-by asssistance     ADL Assessment:  Feeding: Independent     Oral Facial Hygiene/Grooming: Setup     Bathing: Maximum assistance;Minimum assistance     Upper Body Dressing: Independent     Lower Body Dressing: Maximum assistance;Minimum assistance     Toileting: Supervision          Barthel Index:      Bathin  Bladder: 10  Bowels: 10  Groomin  Dressin  Feeding: 10  Mobility: 0  Stairs: 0  Toilet Use: 5  Transfer (Bed to Chair and Back): 10  Total: 55         Barthel and G-code impairment scale:  Percentage of impairment CH  0% CI  1-19% CJ  20-39% CK  40-59% CL  60-79% CM  80-99% CN  100%   Barthel Score 0-100 100 99-80 79-60 59-40 20-39 1-19    0   Barthel Score 0-20 20 17-19 13-16 9-12 5-8 1-4 0      The Barthel ADL Index: Guidelines  1. The index should be used as a record of what a patient does, not as a record of what a patient could do. 2. The main aim is to establish degree of independence from any help, physical or verbal, however minor and for whatever reason. 3. The need for supervision renders the patient not independent. 4. A patient's performance should be established using the best available evidence. Asking the patient, friends/relatives and nurses are the usual sources, but direct observation and common sense are also important. However direct testing is not needed. 5. Usually the patient's performance over the preceding 24-48 hours is important, but occasionally longer periods will be relevant. 6. Middle categories imply that the patient supplies over 50 per cent of the effort. 7. Use of aids to be independent is allowed. Beny Rivas., Barthel, D.W. (7921). Functional evaluation: the Barthel Index. [facility] (14)2. Janice Herring remington [place], J.J.M.F, Ashleigh Arreola., Wendy Walton., [facility], [address] [place] [address] (1999). Measuring the change indisability after inpatient rehabilitation; comparison of the responsiveness of the Barthel Index and Functional [place] Measure. Journal of Neurology, Neurosurgery, and Psychiatry, 66(4), 427-082. Jennifer Turcios, NWHITNEY.A, TRINA Saeed.CARMINA, & Clint Hines MAbdoulA. (2004.) Assessment of post-stroke quality of life in cost-effectiveness studies: The usefulness of the Barthel Index and the EuroQoL-5D. Quality of Life Research, 13, 068-36      Cognitive Retraining  Safety/Judgement: Awareness of environment; Fall prevention           Functional Measure:        G codes:  In compliance with CMSs Claims Based Outcome Reporting, the following G-code set was chosen for this patient based on their primary functional limitation being treated: The outcome measure chosen to determine the severity of the functional limitation was the Barthel with a score of 55/100 which was correlated with the impairment scale. · Self Care:               - CURRENT STATUS:    CK - 40%-59% impaired, limited or restricted               - GOAL STATUS:           CJ - 20%-39% impaired, limited or restricted               - D/C STATUS:                       ---------------To be determined---------------      Occupational Therapy Evaluation Charge Determination   History Examination Decision-Making   LOW Complexity : Brief history review  MEDIUM Complexity : 3-5 performance deficits relating to physical, cognitive , or psychosocial skils that result in activity limitations and / or participation restrictions MEDIUM Complexity : Patient may present with comorbidities that affect occupational performnce. Miniml to moderate modification of tasks or assistance (eg, physical or verbal ) with assesment(s) is necessary to enable patient to complete evaluation       Based on the above components, the patient evaluation is determined to be of the following complexity level: LOW   Pain:  Pain Scale 1: Numeric (0 - 10)  Pain Intensity 1: 7  Pain Location 1: Leg  Pain Orientation 1: Right  Pain Description 1: Aching  Pain Intervention(s) 1: Medication (see MAR); Ice;Rest;Repositioned  Activity Tolerance:   vss  Please refer to the flowsheet for vital signs taken during this treatment.   After treatment:   [X]  Patient left in no apparent distress sitting up in chair  [ ]  Patient left in no apparent distress in bed  [X]  Call bell left within reach  [X]  Nursing notified  [X]  Caregiver present  [ ]  Bed alarm activated      COMMUNICATION/EDUCATION:   Communication/Collaboration:  [X]      Home safety education was provided and the patient/caregiver indicated understanding. [X]      Patient/family have participated as able and agree with findings and recommendations. [ ]      Patient is unable to participate in plan of care at this time.   Findings and recommendations were discussed with: Physical Therapist, Registered Nurse and family     Shannon Perkins OT  Time Calculation: 17 mins

## 2017-03-24 NOTE — PROGRESS NOTES
Orthopedic End of Shift Note    Bedside shift change report given to Dolores Tristan RN (oncoming nurse) by Amanda Bazzi RN (offgoing nurse). Report included the following information SBAR, Kardex, Procedure Summary, Intake/Output, MAR and Recent Results. POD#     Significant issues during shift:     Issues for Physician to address    Nausea/Vomiting [] yes [] no     Pedraza Catheter [] in [] removed    Bowel Movements [] yes [] no     Foot Pumps or SCD [] yes [] no    Ice Pack [] yes    [] no    Incentive Spirometer [] yes [] no . Supplemental O2 [] yes [] no Sat off O2:   96     Patient Vitals for the past 12 hrs:   Temp Pulse Resp BP SpO2   03/24/17 1926 98 °F (36.7 °C) 89 18 144/66 93 %   03/24/17 1619 97.8 °F (36.6 °C) 98 18 93/66 91 %     Lab Results   Component Value Date/Time    HGB 12.2 03/24/2017 05:50 AM    HCT 37.4 03/24/2017 05:50 AM     Lab Results   Component Value Date/Time    INR 1.8 03/24/2017 05:50 AM    INR 2.3 03/23/2017 03:55 PM    INR 1.5 03/17/2017 01:31 PM    INR POC 2.3 03/20/2017 10:22 AM     No intake or output data in the 24 hours ending 03/24/17 1958           Peripheral Intravenous Line:  Peripheral IV 03/23/17 Left Antecubital (Active)   Site Assessment Pink;Ecchymotic (bruised) 3/24/2017  2:42 PM   Phlebitis Assessment 0 3/24/2017  2:42 PM   Infiltration Assessment 0 3/24/2017  2:42 PM   Dressing Status Dry; Intact; Old drainage 3/24/2017  2:42 PM   Dressing Type Tape;Transparent 3/24/2017  2:42 PM   Hub Color/Line Status Pink;Flushed; Infusing;Patent 3/24/2017  2:42 PM   Action Taken Armboard;Dressing changed; Wrapped 3/24/2017  2:42 PM   Alcohol Cap Used No 3/24/2017  2:42 PM     Drain(s): Jewels Farooq

## 2017-03-24 NOTE — PROGRESS NOTES
Orthopedic End of Shift Note    Bedside and Verbal shift change report given to Giselle [address] (oncoming nurse) by Vidal Lind RN (offgoing nurse). Report included the following information SBAR, Kardex, Procedure Summary, Intake/Output and MAR. POD#     Significant issues during shift: pain/ swelling    Issues for Physician to address: numbness/ pain/ swelling    Nausea/Vomiting [] yes [x] no     Pedraza Catheter [] in [x] removed    Bowel Movements [] yes [x] no     Foot Pumps or SCD [] yes [x] no    Ice Pack [] yes    [x] no    Incentive Spirometer [] yes [x] no Volume:   0   Supplemental O2 [] yes [x] no Sat off O2:   96%     Patient Vitals for the past 12 hrs:   Temp Pulse Resp BP SpO2   03/24/17 0739 98.7 °F (37.1 °C) 89 20 142/65 91 %   03/24/17 0445 98.6 °F (37 °C) 88 24 132/58 -     Lab Results   Component Value Date/Time    HGB 12.2 03/24/2017 05:50 AM    HCT 37.4 03/24/2017 05:50 AM     Lab Results   Component Value Date/Time    INR 1.8 03/24/2017 05:50 AM    INR 2.3 03/23/2017 03:55 PM    INR 1.5 03/17/2017 01:31 PM    INR POC 2.3 03/20/2017 10:22 AM     No intake or output data in the 24 hours ending 03/24/17 1528           Peripheral Intravenous Line:  Peripheral IV 03/23/17 Left Antecubital (Active)   Site Assessment Pink;Ecchymotic (bruised) 3/24/2017  2:42 PM   Phlebitis Assessment 0 3/24/2017  2:42 PM   Infiltration Assessment 0 3/24/2017  2:42 PM   Dressing Status Dry; Intact; Old drainage 3/24/2017  2:42 PM   Dressing Type Tape;Transparent 3/24/2017  2:42 PM   Hub Color/Line Status Pink;Flushed; Infusing;Patent 3/24/2017  2:42 PM   Action Taken Armboard;Dressing changed; Wrapped 3/24/2017  2:42 PM   Alcohol Cap Used No 3/24/2017  2:42 PM     Drain(s):

## 2017-03-24 NOTE — PROGRESS NOTES
Patient states she is living independent however her son lives 10 minutes away from her home. Patient doesnt have DME. Patient son will provide transportation to follow-up appointments. Patient employed with [employer] and her insurance will going to finish end of this month. Patient states she has never used HH/SNF services in the past.CM send RW referral to [facility] through [facility]. Care Management Interventions  PCP Verified by CM:  Yes  Mode of Transport at Discharge: Self (pt son will transport.)  Transition of Care Consult (CM Consult): Discharge Planning  Physical Therapy Consult: Yes  Occupational Therapy Consult: Yes  Current Support Network: Lives Alone  Confirm Follow Up Transport: Family  Plan discussed with Pt/Family/Caregiver: Yes  Discharge Location  Discharge Placement:  (TBD)     Commutable  Ext [phone]

## 2017-03-24 NOTE — PROGRESS NOTES
Pharmacy Initial Dosing of Warfarin    Pharmacy consulted to dose warfarin for this  61 y.o. female ordered warfarin for recent PE    Goal INR (2-3, 2.5-3.5, 3-4): 2-3      Concurrent anticoagulants & Platelet Inhibitors: None  Home Warfarin Dose: 10 mg M-F, 5 mg Sat/Sun  Major Interacting Medications (Dose/Frequency): None    INR (0.9-1.1) > 5 discuss with MD:   Recent Labs      03/24/17   0550  03/23/17   1555   INR  1.8*  2.3*   HGB  12.2  13.9   PLT  347  363   APTT   --   44.8*   ALB   --   3.5   SGOT   --   44*   ALT   --   46   TBILI   --   0.8     Warfarin Sensitivity & Sensitivity Factors Present: Weight    Impression/Plan:   5 mg received at 01:52 on 3/24 (which is the dose for 3/23)  Patient's INR dipped down to 1.8 so will give 10 mg tonight. Per vascular - hold off on Lovenox           Pharmacy will follow daily and adjust the dose as appropriate.     Thanks for the consult    Guerita Cardoso, [place]

## 2017-03-25 LAB
ANION GAP BLD CALC-SCNC: 11 MMOL/L (ref 5–15)
BUN SERPL-MCNC: 7 MG/DL (ref 6–20)
BUN/CREAT SERPL: 12 (ref 12–20)
CALCIUM SERPL-MCNC: 8.8 MG/DL (ref 8.5–10.1)
CHLORIDE SERPL-SCNC: 105 MMOL/L (ref 97–108)
CO2 SERPL-SCNC: 25 MMOL/L (ref 21–32)
CREAT SERPL-MCNC: 0.6 MG/DL (ref 0.55–1.02)
GLUCOSE SERPL-MCNC: 93 MG/DL (ref 65–100)
INR PPP: 2.1 (ref 0.9–1.1)
POTASSIUM SERPL-SCNC: 3.9 MMOL/L (ref 3.5–5.1)
PROTHROMBIN TIME: 21.3 SEC (ref 9–11.1)
SODIUM SERPL-SCNC: 141 MMOL/L (ref 136–145)

## 2017-03-25 PROCEDURE — 36415 COLL VENOUS BLD VENIPUNCTURE: CPT | Performed by: EMERGENCY MEDICINE

## 2017-03-25 PROCEDURE — 85610 PROTHROMBIN TIME: CPT | Performed by: EMERGENCY MEDICINE

## 2017-03-25 PROCEDURE — 74011250637 HC RX REV CODE- 250/637: Performed by: HOSPITALIST

## 2017-03-25 PROCEDURE — 74011250636 HC RX REV CODE- 250/636: Performed by: HOSPITALIST

## 2017-03-25 PROCEDURE — 65270000029 HC RM PRIVATE

## 2017-03-25 PROCEDURE — 80048 BASIC METABOLIC PNL TOTAL CA: CPT | Performed by: HOSPITALIST

## 2017-03-25 PROCEDURE — 74011250637 HC RX REV CODE- 250/637: Performed by: INTERNAL MEDICINE

## 2017-03-25 PROCEDURE — 74011250636 HC RX REV CODE- 250/636: Performed by: SURGERY

## 2017-03-25 PROCEDURE — 74011250636 HC RX REV CODE- 250/636: Performed by: INTERNAL MEDICINE

## 2017-03-25 RX ORDER — WARFARIN SODIUM 5 MG/1
5 TABLET ORAL ONCE
Status: COMPLETED | OUTPATIENT
Start: 2017-03-25 | End: 2017-03-25

## 2017-03-25 RX ORDER — HYDROMORPHONE HYDROCHLORIDE 1 MG/ML
2 INJECTION, SOLUTION INTRAMUSCULAR; INTRAVENOUS; SUBCUTANEOUS
Status: DISCONTINUED | OUTPATIENT
Start: 2017-03-25 | End: 2017-03-28 | Stop reason: HOSPADM

## 2017-03-25 RX ADMIN — Medication 10 ML: at 14:00

## 2017-03-25 RX ADMIN — Medication 10 ML: at 06:38

## 2017-03-25 RX ADMIN — HYDROCODONE BITARTRATE AND ACETAMINOPHEN 1 TABLET: 5; 325 TABLET ORAL at 09:48

## 2017-03-25 RX ADMIN — GABAPENTIN 300 MG: 300 CAPSULE ORAL at 18:22

## 2017-03-25 RX ADMIN — ONDANSETRON HYDROCHLORIDE 4 MG: 2 INJECTION, SOLUTION INTRAMUSCULAR; INTRAVENOUS at 05:11

## 2017-03-25 RX ADMIN — PANTOPRAZOLE SODIUM 40 MG: 40 TABLET, DELAYED RELEASE ORAL at 06:38

## 2017-03-25 RX ADMIN — FLUTICASONE FUROATE AND VILANTEROL TRIFENATATE 1 PUFF: 200; 25 POWDER RESPIRATORY (INHALATION) at 18:34

## 2017-03-25 RX ADMIN — HYDROCODONE BITARTRATE AND ACETAMINOPHEN 1 TABLET: 5; 325 TABLET ORAL at 18:22

## 2017-03-25 RX ADMIN — HYDROMORPHONE HYDROCHLORIDE 2 MG: 1 INJECTION, SOLUTION INTRAMUSCULAR; INTRAVENOUS; SUBCUTANEOUS at 21:58

## 2017-03-25 RX ADMIN — VENLAFAXINE HYDROCHLORIDE 150 MG: 150 CAPSULE, EXTENDED RELEASE ORAL at 09:48

## 2017-03-25 RX ADMIN — HYDROMORPHONE HYDROCHLORIDE 1 MG: 1 INJECTION, SOLUTION INTRAMUSCULAR; INTRAVENOUS; SUBCUTANEOUS at 05:06

## 2017-03-25 RX ADMIN — WARFARIN SODIUM 5 MG: 5 TABLET ORAL at 18:22

## 2017-03-25 RX ADMIN — GABAPENTIN 300 MG: 300 CAPSULE ORAL at 09:48

## 2017-03-25 RX ADMIN — HYDROMORPHONE HYDROCHLORIDE 2 MG: 1 INJECTION, SOLUTION INTRAMUSCULAR; INTRAVENOUS; SUBCUTANEOUS at 12:28

## 2017-03-25 RX ADMIN — Medication 10 ML: at 05:07

## 2017-03-25 NOTE — PROGRESS NOTES
Bedside shift change report given to Skylar (oncoming nurse) by Luis Briones (offgoing nurse). Report included the following information SBAR, Procedure Summary, Intake/Output, MAR, Accordion and Recent Results.

## 2017-03-25 NOTE — PROGRESS NOTES
Pt does not want to elevate leg with cushion or pillow. She said that it hurts too much and she has been lowering the head of the bed to flat position and raising lower bed portion. I explained that the lower leg in that position is still not elevated but she does not want to use any other method. She feels that her foot is now slightly numb.

## 2017-03-25 NOTE — PROGRESS NOTES
Hospitalist Progress Note    NAME: Amanda Holly   :  1960   MRN:  [id number]       Interim Hospital Summary: 61 y.o. female whom presented on 3/23/2017 with      Assessment / Plan:  Right legt swelling, pain, erythema: POA  2nd to Rt LE Swelling with intramuscular Hematoma    She has multiple varicose veins superficially that having been on double anticoagulant Coumadin and lovenox would have easily caused a Heavy ecchymoses  Doppler is negative for DVT ( third Doppler in a month)  Discontinued Ancef  -prn pain medication, Norco and IV dilaudid  -keep leg elevated  -vascular Dr. Duy Ramsey already on board, MRI of LE  Continue Coumadin but No Lovenox overlap  Have discussed with Dr. Duy Ramsey previously. Still needs anticoagualtion due to recent PE, so continue but only Coumadin  Since No current DVT, no indication of placing IVC filter now.     Recent H/O PE: POA  INR therapeutic 2.3, will consult pharmacy to dose it  RA sats low, check Sats when Ambulating to see if it drops     Asthma/COPD: POA  not wheezing, continue Duonebs prn.     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      Review of Systems:  Symptom Y/N Comments  Symptom Y/N Comments   Fever/Chills n   Chest Pain n    Poor Appetite n   Edema y    Cough n   Abdominal Pain n    Sputum n   Joint Pain n    SOB/TURNER n   Pruritis/Rash     Nausea/vomit n   Tolerating PT/OT     Diarrhea    Tolerating Diet     Constipation    Other       Could NOT obtain due to:      Objective:     VITALS:   Last 24hrs VS reviewed since prior progress note. Most recent are:  Patient Vitals for the past 24 hrs:   Temp Pulse Resp BP SpO2   03/25/17 1834 - 83 - 113/42 -   03/25/17 1553 98.1 °F (36.7 °C) 83 18 113/42 94 %   03/25/17 1153 98.7 °F (37.1 °C) 89 18 129/44 94 %   03/25/17 0517 97.7 °F (36.5 °C) 100 18 118/65 96 %   03/24/17 1926 98 °F (36.7 °C) 89 18 144/66 93 %     No intake or output data in the 24 hours ending 03/25/17 1900     PHYSICAL EXAM:  General: WD, WN. Alert, cooperative, no acute distress    EENT:  EOMI. Anicteric sclerae. MMM  Resp:  CTA bilaterally, no wheezing or rales. No accessory muscle use  CV:  Regular  rhythm,  No edema  GI:  Soft, Non distended, Non tender.  +Bowel sounds  Neurologic:  Alert and oriented X 3, normal speech,   Psych:   Good insight. Not anxious nor agitated  Skin:  No rashes. No jaundice  EXT:  Has Bilateral  Obese thighs with Very prounounced Varicosities Bilaterally up to thighs            Also multiple Superficial Spider And Serpentine Veins            Rt Leg with a dependent Ecchymoses in the distal 2/3  Down to feet. With swelling           Warm but does  Not appear Cellulitis            Strong bounding dorsal pedis Pulse, good cap refill, warm foot    Reviewed most current lab test results and cultures  YES  Reviewed most current radiology test results   YES  Review and summation of old records today    NO  Reviewed patient's current orders and MAR    YES  PMH/ reviewed - no change compared to H&P  ________________________________________________________________________  Care Plan discussed with:    Comments   Patient y    Family      RN     Care Manager     Consultant                        Multidiciplinary team rounds were held today with , nursing, pharmacist and clinical coordinator. Patient's plan of care was discussed; medications were reviewed and discharge planning was addressed. ________________________________________________________________________  Total NON critical care TIME:  20   Minutes    Total CRITICAL CARE TIME Spent:   Minutes non procedure based      Comments   >50% of visit spent in counseling and coordination of care     ________________________________________________________________________  Dana Lopes MD     Procedures: see electronic medical records for all procedures/Xrays and details which were not copied into this note but were reviewed prior to creation of Plan. LABS:  I reviewed today's most current labs and imaging studies.   Pertinent labs include:  Recent Labs      03/24/17   0550  03/23/17   1555   WBC  6.8  8.2   HGB  12.2  13.9   HCT  37.4  43.2   PLT  347  363     Recent Labs      03/25/17   0521  03/25/17   0520  03/24/17   0550  03/23/17   1555   NA  141   --   141  140   K  3.9   --   4.2  3.9   CL  105   --   106  103   CO2  25   --   24  28   GLU  93   --   101*  91   BUN  7   --   6  7   CREA  0.60   --   0.57  0.69   CA  8.8   --   8.8  9.2   ALB   --    --    --   3.5   TBILI   --    --    --   0.8   SGOT   --    --    --   44*   ALT   --    --    --   46   INR   --   2.1*  1.8*  2.3*       Signed: Dana Lopes MD

## 2017-03-25 NOTE — PROGRESS NOTES
Pharmacy Initial Dosing of Warfarin    Pharmacy consulted to dose warfarin for this  61 y.o. female ordered warfarin for recent PE    Goal INR (2-3, 2.5-3.5, 3-4): 2-3    Concurrent anticoagulants & Platelet Inhibitors: None  Home Warfarin Dose: 10 mg M-F, 5 mg Sat/Sun  Major Interacting Medications (Dose/Frequency): None    INR (0.9-1.1) > 5 discuss with MD:   Recent Labs      03/25/17   0520  03/24/17   0550  03/23/17   1555   INR  2.1*  1.8*  2.3*   HGB   --   12.2  13.9   PLT   --   347  363   APTT   --    --   44.8*   ALB   --    --   3.5   SGOT   --    --   44*   ALT   --    --   46   TBILI   --    --   0.8     Warfarin Sensitivity & Sensitivity Factors Present: Weight    Impression/Plan:   - Will give 5 mg today. - Daily INR on order     Pharmacy will follow daily and adjust the dose as appropriate.     Thanks for the consult  Patricio Dominguez, PharmD  PGY-1 Pharmacy Resident

## 2017-03-25 NOTE — PROGRESS NOTES
Vascular Surgery  --MRI reviewed with patient  --calf hematoma;   --foot is mobile; no increase in pain  --would continue coumadin only for now and bedrest/observation for now

## 2017-03-26 LAB
INR PPP: 1.7 (ref 0.9–1.1)
PROTHROMBIN TIME: 17.8 SEC (ref 9–11.1)

## 2017-03-26 PROCEDURE — 74011250637 HC RX REV CODE- 250/637: Performed by: SURGERY

## 2017-03-26 PROCEDURE — 74011250637 HC RX REV CODE- 250/637: Performed by: HOSPITALIST

## 2017-03-26 PROCEDURE — 74011250637 HC RX REV CODE- 250/637: Performed by: INTERNAL MEDICINE

## 2017-03-26 PROCEDURE — 77010033678 HC OXYGEN DAILY

## 2017-03-26 PROCEDURE — 36415 COLL VENOUS BLD VENIPUNCTURE: CPT | Performed by: EMERGENCY MEDICINE

## 2017-03-26 PROCEDURE — 85610 PROTHROMBIN TIME: CPT | Performed by: EMERGENCY MEDICINE

## 2017-03-26 PROCEDURE — 65270000029 HC RM PRIVATE

## 2017-03-26 RX ORDER — WARFARIN SODIUM 5 MG/1
10 TABLET ORAL ONCE
Status: COMPLETED | OUTPATIENT
Start: 2017-03-26 | End: 2017-03-26

## 2017-03-26 RX ORDER — WARFARIN SODIUM 5 MG/1
10 TABLET ORAL ONCE
Status: DISCONTINUED | OUTPATIENT
Start: 2017-03-26 | End: 2017-03-26

## 2017-03-26 RX ADMIN — Medication 10 ML: at 20:53

## 2017-03-26 RX ADMIN — HYDROCODONE BITARTRATE AND ACETAMINOPHEN 1 TABLET: 5; 325 TABLET ORAL at 09:36

## 2017-03-26 RX ADMIN — GABAPENTIN 300 MG: 300 CAPSULE ORAL at 09:36

## 2017-03-26 RX ADMIN — FLUTICASONE FUROATE AND VILANTEROL TRIFENATATE 1 PUFF: 200; 25 POWDER RESPIRATORY (INHALATION) at 09:41

## 2017-03-26 RX ADMIN — HYDROCODONE BITARTRATE AND ACETAMINOPHEN 1 TABLET: 5; 325 TABLET ORAL at 23:49

## 2017-03-26 RX ADMIN — Medication 10 ML: at 03:10

## 2017-03-26 RX ADMIN — HYDROCODONE BITARTRATE AND ACETAMINOPHEN 1 TABLET: 5; 325 TABLET ORAL at 03:09

## 2017-03-26 RX ADMIN — PANTOPRAZOLE SODIUM 40 MG: 40 TABLET, DELAYED RELEASE ORAL at 09:36

## 2017-03-26 RX ADMIN — HYDROCODONE BITARTRATE AND ACETAMINOPHEN 1 TABLET: 5; 325 TABLET ORAL at 19:50

## 2017-03-26 RX ADMIN — Medication 10 ML: at 14:00

## 2017-03-26 RX ADMIN — WARFARIN SODIUM 10 MG: 5 TABLET ORAL at 20:52

## 2017-03-26 RX ADMIN — VENLAFAXINE HYDROCHLORIDE 150 MG: 150 CAPSULE, EXTENDED RELEASE ORAL at 09:36

## 2017-03-26 RX ADMIN — ZOLPIDEM TARTRATE 5 MG: 5 TABLET ORAL at 00:00

## 2017-03-26 RX ADMIN — HYDROCODONE BITARTRATE AND ACETAMINOPHEN 1 TABLET: 5; 325 TABLET ORAL at 14:39

## 2017-03-26 RX ADMIN — GABAPENTIN 300 MG: 300 CAPSULE ORAL at 20:51

## 2017-03-26 NOTE — PROGRESS NOTES
Hospitalist Progress Note    NAME: Matt Ruth   :  1960   MRN:  [id number]       Interim Hospital Summary: 61 y.o. female whom presented on 3/23/2017 with      Assessment / Plan:  Right legt swelling, pain, erythema: POA  2nd to Rt LE Swelling with intramuscular Hematoma seen on MRI    She has multiple varicose veins superficially that having been on double anticoagulant Coumadin and lovenox   Doppler is negative for DVT ( third Doppler in a month)  -prn pain medication, Norco and IV dilaudid  -keep leg elevated  -vascular Dr. Zain Gallego already on board, MRI of LE  Continue Coumadin but No Lovenox overlap  Have discussed with Dr. Zain Gallego previously. Still needs anticoagualtion due to recent PE, so continue but only Coumadin no Lovenox bridge  Since No current DVT, no indication of placing IVC filter now.     Recent H/O PE: POA/ Coumadinm, INR is low  PHARMACY IS DOSING  INR therapeutic 2.3, will consult pharmacy to dose it  RA sats low, check Sats when Ambulating to see if it drops     Asthma/COPD: POA  not wheezing, continue Duonebs prn.     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      Review of Systems:  Symptom Y/N Comments  Symptom Y/N Comments   Fever/Chills n   Chest Pain n    Poor Appetite n   Edema y    Cough n   Abdominal Pain n    Sputum n   Joint Pain n    SOB/TURNER n   Pruritis/Rash     Nausea/vomit n   Tolerating PT/OT     Diarrhea    Tolerating Diet     Constipation    Other       Could NOT obtain due to:      Objective:     VITALS:   Last 24hrs VS reviewed since prior progress note. Most recent are:  Patient Vitals for the past 24 hrs:   Temp Pulse Resp BP SpO2   03/26/17 1602 98 °F (36.7 °C) 77 16 115/70 92 %   03/26/17 0941 - 68 - 128/56 -   03/26/17 0710 98.4 °F (36.9 °C) 68 14 128/56 96 %   03/26/17 0324 98.2 °F (36.8 °C) 60 12 125/47 95 %   03/26/17 0323 - - - - 95 %   03/26/17 0002 98.3 °F (36.8 °C) 70 16 129/55 92 %   03/25/17 2037 98.6 °F (37 °C) 86 18 131/69 92 %   03/25/17 1834 - 83 - 113/42 -     No intake or output data in the 24 hours ending 03/26/17 1830     PHYSICAL EXAM:  General: WD, WN. Alert, cooperative, no acute distress    EENT:  EOMI. Anicteric sclerae. MMM  Resp:  CTA bilaterally, no wheezing or rales. No accessory muscle use  CV:  Regular  rhythm,  No edema  GI:  Soft, Non distended, Non tender.  +Bowel sounds  Neurologic:  Alert and oriented X 3, normal speech,   Psych:   Good insight. Not anxious nor agitated  Skin:  No rashes. No jaundice  EXT:  Has Bilateral  Obese thighs with Very prounounced Varicosities Bilaterally up to thighs            Also multiple Superficial Spider And Serpentine Veins            Old Hematoma, there is decrease in Edema and tenderness in the Rt Leg    Reviewed most current lab test results and cultures  YES  Reviewed most current radiology test results   YES  Review and summation of old records today    NO  Reviewed patient's current orders and MAR    YES  PMH/ reviewed - no change compared to H&P  ________________________________________________________________________  Care Plan discussed with:    Comments   Patient y    Family  y family   RN     Care Manager     Consultant                        Multidiciplinary team rounds were held today with , nursing, pharmacist and clinical coordinator. Patient's plan of care was discussed; medications were reviewed and discharge planning was addressed.      ________________________________________________________________________  Total NON critical care TIME:  20   Minutes    Total CRITICAL CARE TIME Spent:   Minutes non procedure based      Comments   >50% of visit spent in counseling and coordination of care     ________________________________________________________________________  Heaven Garza MD     Procedures: see electronic medical records for all procedures/Xrays and details which were not copied into this note but were reviewed prior to creation of Plan. LABS:  I reviewed today's most current labs and imaging studies.   Pertinent labs include:  Recent Labs      03/24/17   0550   WBC  6.8   HGB  12.2   HCT  37.4   PLT  347     Recent Labs      03/26/17   0603  03/25/17   0521  03/25/17   0520  03/24/17   0550   NA   --   141   --   141   K   --   3.9   --   4.2   CL   --   105   --   106   CO2   --   25   --   24   GLU   --   93   --   101*   BUN   --   7   --   6   CREA   --   0.60   --   0.57   CA   --   8.8   --   8.8   INR  1.7*   --   2.1*  1.8*       Signed: Heaven Garza MD

## 2017-03-26 NOTE — ROUTINE PROCESS
Bedside and Verbal shift change report given to Maura Keenan RN (oncoming nurse) by Shaggy Montgomery RN (offgoing nurse). Report included the following information SBAR, Kardex, Intake/Output, MAR, Recent Results and Med Rec Status.

## 2017-03-26 NOTE — PROGRESS NOTES
Vascular Surgery  --calf appears to be improved  --INR down; needs more coumadin today  --continue elevation, rest

## 2017-03-26 NOTE — PROGRESS NOTES
Pharmacy Initial Dosing of Warfarin    Pharmacy consulted to dose warfarin for this  61 y.o. female ordered warfarin for recent PE    Goal INR (2-3, 2.5-3.5, 3-4): 2-3      Concurrent anticoagulants & Platelet Inhibitors: None  Home Warfarin Dose: 10 mg M-F, 5 mg Sat/Sun  Major Interacting Medications (Dose/Frequency): None    INR (0.9-1.1) > 5 discuss with MD:   Recent Labs      03/26/17   0603  03/25/17   0520  03/24/17   0550  03/23/17   1555   INR  1.7*  2.1*  1.8*  2.3*   HGB   --    --   12.2  13.9   PLT   --    --   347  363   APTT   --    --    --   44.8*   ALB   --    --    --   3.5   SGOT   --    --    --   44*   ALT   --    --    --   46   TBILI   --    --    --   0.8     Warfarin Sensitivity & Sensitivity Factors Present: Weight    Impression/Plan:   - Will give 10 mg today. - Daily INR on order     Pharmacy will follow daily and adjust the dose as appropriate.     Thanks for the consult  Kenzie Box, PharmD  PGY-1 Pharmacy Resident          [facility] Dosing and Monitoring Guidelines

## 2017-03-27 LAB
HCT VFR BLD AUTO: 35.3 % (ref 35–47)
HGB BLD-MCNC: 11.2 G/DL (ref 11.5–16)
INR PPP: 1.4 (ref 0.9–1.1)
PROTHROMBIN TIME: 14.6 SEC (ref 9–11.1)

## 2017-03-27 PROCEDURE — 65270000029 HC RM PRIVATE

## 2017-03-27 PROCEDURE — 85018 HEMOGLOBIN: CPT | Performed by: INTERNAL MEDICINE

## 2017-03-27 PROCEDURE — 36415 COLL VENOUS BLD VENIPUNCTURE: CPT | Performed by: INTERNAL MEDICINE

## 2017-03-27 PROCEDURE — 85610 PROTHROMBIN TIME: CPT | Performed by: INTERNAL MEDICINE

## 2017-03-27 PROCEDURE — 74011250637 HC RX REV CODE- 250/637: Performed by: HOSPITALIST

## 2017-03-27 PROCEDURE — 74011250637 HC RX REV CODE- 250/637: Performed by: INTERNAL MEDICINE

## 2017-03-27 PROCEDURE — 77010033678 HC OXYGEN DAILY

## 2017-03-27 RX ADMIN — GABAPENTIN 300 MG: 300 CAPSULE ORAL at 17:07

## 2017-03-27 RX ADMIN — Medication 10 ML: at 21:15

## 2017-03-27 RX ADMIN — HYDROCODONE BITARTRATE AND ACETAMINOPHEN 1 TABLET: 5; 325 TABLET ORAL at 23:58

## 2017-03-27 RX ADMIN — PANTOPRAZOLE SODIUM 40 MG: 40 TABLET, DELAYED RELEASE ORAL at 10:16

## 2017-03-27 RX ADMIN — ZOLPIDEM TARTRATE 5 MG: 5 TABLET ORAL at 01:46

## 2017-03-27 RX ADMIN — HYDROCODONE BITARTRATE AND ACETAMINOPHEN 1 TABLET: 5; 325 TABLET ORAL at 05:44

## 2017-03-27 RX ADMIN — ZOLPIDEM TARTRATE 5 MG: 5 TABLET ORAL at 23:58

## 2017-03-27 RX ADMIN — FLUTICASONE FUROATE AND VILANTEROL TRIFENATATE 1 PUFF: 200; 25 POWDER RESPIRATORY (INHALATION) at 09:00

## 2017-03-27 RX ADMIN — GABAPENTIN 300 MG: 300 CAPSULE ORAL at 10:17

## 2017-03-27 RX ADMIN — HYDROCODONE BITARTRATE AND ACETAMINOPHEN 1 TABLET: 5; 325 TABLET ORAL at 10:17

## 2017-03-27 RX ADMIN — HYDROCODONE BITARTRATE AND ACETAMINOPHEN 1 TABLET: 5; 325 TABLET ORAL at 14:13

## 2017-03-27 RX ADMIN — Medication 10 ML: at 14:00

## 2017-03-27 RX ADMIN — APIXABAN 5 MG: 5 TABLET, FILM COATED ORAL at 10:17

## 2017-03-27 RX ADMIN — HYDROCODONE BITARTRATE AND ACETAMINOPHEN 1 TABLET: 5; 325 TABLET ORAL at 18:16

## 2017-03-27 RX ADMIN — VENLAFAXINE HYDROCHLORIDE 150 MG: 150 CAPSULE, EXTENDED RELEASE ORAL at 10:16

## 2017-03-27 RX ADMIN — APIXABAN 5 MG: 5 TABLET, FILM COATED ORAL at 21:15

## 2017-03-27 RX ADMIN — Medication 10 ML: at 05:48

## 2017-03-27 NOTE — PROGRESS NOTES
Hospitalist Progress Note    NAME: Donell Goodman   :  1960   MRN:  [id number]       Interim Hospital Summary: 61 y.o. female whom presented on 3/23/2017 with      Assessment / Plan:  Right legt swelling, pain, erythema: POA  2nd to Rt LE Swelling with intramuscular Hematoma seen on MRI    She has multiple varicose veins superficially that having been on double anticoagulant Coumadin and lovenox   Doppler is negative for DVT ( third Doppler in a month)  -prn pain medication, Norco and IV dilaudid  -keep leg elevated,   -vascular Dr. Irasema Evans already on board, MRI of LE  Continue Coumadin but No Lovenox overlap    discussed with Dr. Irasema Evans last week and today; PLAN  1Lovenox was stopped due to New Hematoma on double coverage  2. However w/Coumadin alone,  Inr has drifted from 2.1, to 1.7 today 1.4  Will NOT restart Lovenox due to risk of worsening Hematoma  3. instead Change to Eliquis and do not Load. Start at 5 mg BID   4. No current DVT, no indication of placing IVC filter now as need to continue anticoagulation anyway. 5. CM to try to obtain 6 months of Eliquis from company. Has a 1 month card. Dr Irasema Evans to check if has samples .  If unable to obtain Eliquis for 6 months, will need to be transitioned by PCP in future to complete 6 months of anticoaguation, recheck hypercoagulable panel and decide if needs lifetime     Recent H/O PE: POA/ Coumadinm, INR is low  PHARMACY IS DOSING  INR therapeutic 2.3, will consult pharmacy to dose it  RA sats low, check Sats when Ambulating to see if it drops     Asthma/COPD: POA  not wheezing, continue Duonebs prn.     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 Review of Systems:  Symptom Y/N Comments  Symptom Y/N Comments   Fever/Chills n   Chest Pain n    Poor Appetite n   Edema y    Cough n   Abdominal Pain n    Sputum n   Joint Pain n    SOB/TURNER n   Pruritis/Rash     Nausea/vomit n   Tolerating PT/OT     Diarrhea    Tolerating Diet     Constipation    Other       Could NOT obtain due to:      Objective:     VITALS:   Last 24hrs VS reviewed since prior progress note. Most recent are:  Patient Vitals for the past 24 hrs:   Temp Pulse Resp BP SpO2   03/27/17 1518 98 °F (36.7 °C) 77 17 139/69 90 %   03/27/17 1115 98.2 °F (36.8 °C) 72 17 132/69 91 %   03/27/17 0900 - 68 - 123/64 -   03/27/17 0522 98.2 °F (36.8 °C) 66 18 115/59 93 %   03/26/17 1945 97.7 °F (36.5 °C) 77 18 135/67 96 %     No intake or output data in the 24 hours ending 03/27/17 1800     PHYSICAL EXAM:  General: WD, WN. Alert,  Obese cooperative, no acute distress    EENT:  EOMI. Anicteric sclerae. MMM  Resp:  CTA bilaterally, no wheezing or rales. No accessory muscle use  CV:  Regular  rhythm,  No edema  GI:  Soft, Non distended, Non tender.  +Bowel sounds  Neurologic:  Alert and oriented X 3, normal speech,   Psych:   Good insight. Not anxious nor agitated  Skin:  No rashes.   No jaundice  EXT:  Has Bilateral  Obese thighs with Very prounounced Varicosities Bilaterally up to thighs            Also multiple Superficial Spider And Serpentine Veins            Old Hematoma, there is decrease in Edema and tenderness in the Rt Leg            Significant improvement of Rt LE swelling and no new hematoma, now all OLD looking    Reviewed most current lab test results and cultures  YES  Reviewed most current radiology test results   YES  Review and summation of old records today    NO  Reviewed patient's current orders and MAR    YES  PMH/SH reviewed - no change compared to H&P  ________________________________________________________________________  Care Plan discussed with:    Comments   Patient y    [facility]AT[facility] y    Consultant  y Dr. Anibal Bynum team rounds were held today with , nursing, pharmacist and clinical coordinator. Patient's plan of care was discussed; medications were reviewed and discharge planning was addressed. ________________________________________________________________________  Total NON critical care TIME:  20   Minutes    Total CRITICAL CARE TIME Spent:   Minutes non procedure based      Comments   >50% of visit spent in counseling and coordination of care     ________________________________________________________________________  Jennifer Young MD     Procedures: see electronic medical records for all procedures/Xrays and details which were not copied into this note but were reviewed prior to creation of Plan. LABS:  I reviewed today's most current labs and imaging studies.   Pertinent labs include:  Recent Labs      03/27/17   0653   HGB  11.2*   HCT  35.3     Recent Labs      03/27/17   0653  03/26/17   0603  03/25/17   0521  03/25/17   0520   NA   --    --   141   --    K   --    --   3.9   --    CL   --    --   105   --    CO2   --    --   25   --    GLU   --    --   93   --    BUN   --    --   7   --    CREA   --    --   0.60   --    CA   --    --   8.8   --    INR  1.4*  1.7*   --   2.1*       Signed: Jennifer Young MD

## 2017-03-27 NOTE — PROGRESS NOTES
Vascular    Pain and swelling improving  Pain w/ ambulation is expected  RLE less swollen and tender  INR 1.4 despite coumadin  She is clearly difficult to maintain on coumadin  Highest risk of rebleeding is w/ Lovenox and Coumadin  Will switch to Eliquis  If she tolerates this today, can D/C home tomorrow on 5mg BID  Can do PT as tolerated

## 2017-03-27 NOTE — PROGRESS NOTES
RW walker delivered to pt room. Delivery  slip signed and a copy given to pt. CM also gave pt Eliquis 30 day  free trial discount card. CM also gave print out from [employer] is active until end of this month.      Melina Singh  Ext [phone]

## 2017-03-27 NOTE — PROGRESS NOTES
Physical Therapy  Discussed PLOC with RN as the chart is unclear for treatment for pt with possible new bleed in calf. Pt continues to have  pain that becomes worse with walking. RN advises  PT should abstain from therapy until new orders received. Will follow.

## 2017-03-27 NOTE — ROUTINE PROCESS
Bedside and Verbal shift change report given to Monse Tenorio (oncoming nurse) by Yamile Bob RN (offgoing nurse). Report included the following information SBAR, Kardex, ED Summary, Intake/Output, MAR, Accordion, Recent Results and Med Rec Status.

## 2017-03-27 NOTE — PROGRESS NOTES
Mrs. Hamlet Cee complained of right calf pain and swelling calf appears red pt foot has a darkish gray tent to it which is normal to her previous assessment. Pt. Does have a small area with pinpoint red spots on calf which is new and some shortness of breath with activity. In-house MD. Maria Fernanda Grimm notified express she admitted pt. Everything was negative and that pt. Is probably just having some bruising from the coumadin, also states patient had a PE and she will have some shortness of breath.  Advised to measure calf and ankle did so gave measurements to [facility] RN daytime nurse

## 2017-03-27 NOTE — PROGRESS NOTES
Physical Therapy came to ambulate patient. Patient concerned that possible new bleed in calf with accompanying pain will become worse if walking. I told PT that until doctor advises about new symptoms PT should abstain from therapy.

## 2017-03-27 NOTE — PROGRESS NOTES
Pharmacy Monitoring for Apixaban    Pharmacy review for this 61 y.o. female ordered Apixaban for DVT    Major Interacting Medications: none  Platelet Inhibitors: none    Recent Labs      03/27/17   0653  03/26/17   0603  03/25/17   0521  03/25/17   0520   INR  1.4*  1.7*   --   2.1*   HGB  11.2*   --    --    --    CREA   --    --   0.60   --    BUN   --    --   7   --        Child Jimenez Score, if applicable (Avoid if level C): n/a      Impression/Plan: DVT: changed from Warfarin  Apixaban 5mg po BID is appropriate  CBC & BMP every other day       Thanks    Katya Aguayo, [facility]

## 2017-03-28 VITALS
WEIGHT: 250 LBS | HEIGHT: 66 IN | TEMPERATURE: 98.2 F | RESPIRATION RATE: 16 BRPM | OXYGEN SATURATION: 96 % | SYSTOLIC BLOOD PRESSURE: 112 MMHG | DIASTOLIC BLOOD PRESSURE: 62 MMHG | HEART RATE: 68 BPM | BODY MASS INDEX: 40.18 KG/M2

## 2017-03-28 LAB
ANION GAP BLD CALC-SCNC: 10 MMOL/L (ref 5–15)
BUN SERPL-MCNC: 9 MG/DL (ref 6–20)
BUN/CREAT SERPL: 17 (ref 12–20)
CALCIUM SERPL-MCNC: 8.5 MG/DL (ref 8.5–10.1)
CHLORIDE SERPL-SCNC: 107 MMOL/L (ref 97–108)
CO2 SERPL-SCNC: 25 MMOL/L (ref 21–32)
CREAT SERPL-MCNC: 0.52 MG/DL (ref 0.55–1.02)
ERYTHROCYTE [DISTWIDTH] IN BLOOD BY AUTOMATED COUNT: 14.3 % (ref 11.5–14.5)
GLUCOSE SERPL-MCNC: 81 MG/DL (ref 65–100)
HCT VFR BLD AUTO: 36.1 % (ref 35–47)
HGB BLD-MCNC: 11.4 G/DL (ref 11.5–16)
MCH RBC QN AUTO: 28.5 PG (ref 26–34)
MCHC RBC AUTO-ENTMCNC: 31.6 G/DL (ref 30–36.5)
MCV RBC AUTO: 90.3 FL (ref 80–99)
PLATELET # BLD AUTO: 393 K/UL (ref 150–400)
POTASSIUM SERPL-SCNC: 3.9 MMOL/L (ref 3.5–5.1)
RBC # BLD AUTO: 4 M/UL (ref 3.8–5.2)
SODIUM SERPL-SCNC: 142 MMOL/L (ref 136–145)
WBC # BLD AUTO: 5.3 K/UL (ref 3.6–11)

## 2017-03-28 PROCEDURE — 74011250637 HC RX REV CODE- 250/637: Performed by: INTERNAL MEDICINE

## 2017-03-28 PROCEDURE — 36415 COLL VENOUS BLD VENIPUNCTURE: CPT | Performed by: INTERNAL MEDICINE

## 2017-03-28 PROCEDURE — 74011250637 HC RX REV CODE- 250/637: Performed by: HOSPITALIST

## 2017-03-28 PROCEDURE — 85027 COMPLETE CBC AUTOMATED: CPT | Performed by: INTERNAL MEDICINE

## 2017-03-28 PROCEDURE — 80048 BASIC METABOLIC PNL TOTAL CA: CPT | Performed by: INTERNAL MEDICINE

## 2017-03-28 RX ORDER — HYDROCODONE BITARTRATE AND ACETAMINOPHEN 5; 325 MG/1; MG/1
1 TABLET ORAL
Qty: 60 TAB | Refills: 0 | Status: SHIPPED | OUTPATIENT
Start: 2017-03-28 | End: 2017-05-30

## 2017-03-28 RX ORDER — SODIUM CHLORIDE 0.9 % (FLUSH) 0.9 %
5-10 SYRINGE (ML) INJECTION EVERY 8 HOURS
Status: DISCONTINUED | OUTPATIENT
Start: 2017-03-28 | End: 2017-03-28 | Stop reason: HOSPADM

## 2017-03-28 RX ORDER — SODIUM CHLORIDE 0.9 % (FLUSH) 0.9 %
5-10 SYRINGE (ML) INJECTION AS NEEDED
Status: DISCONTINUED | OUTPATIENT
Start: 2017-03-28 | End: 2017-03-28 | Stop reason: HOSPADM

## 2017-03-28 RX ADMIN — VENLAFAXINE HYDROCHLORIDE 150 MG: 150 CAPSULE, EXTENDED RELEASE ORAL at 08:37

## 2017-03-28 RX ADMIN — SUCRALFATE 0.5 G: 1 SUSPENSION ORAL at 08:37

## 2017-03-28 RX ADMIN — GABAPENTIN 300 MG: 300 CAPSULE ORAL at 08:54

## 2017-03-28 RX ADMIN — APIXABAN 5 MG: 5 TABLET, FILM COATED ORAL at 08:37

## 2017-03-28 RX ADMIN — FLUTICASONE FUROATE AND VILANTEROL TRIFENATATE 1 PUFF: 200; 25 POWDER RESPIRATORY (INHALATION) at 09:00

## 2017-03-28 RX ADMIN — HYDROCODONE BITARTRATE AND ACETAMINOPHEN 1 TABLET: 5; 325 TABLET ORAL at 11:36

## 2017-03-28 RX ADMIN — PANTOPRAZOLE SODIUM 40 MG: 40 TABLET, DELAYED RELEASE ORAL at 08:37

## 2017-03-28 RX ADMIN — Medication 5 ML: at 06:37

## 2017-03-28 RX ADMIN — HYDROCODONE BITARTRATE AND ACETAMINOPHEN 1 TABLET: 5; 325 TABLET ORAL at 06:37

## 2017-03-28 NOTE — DISCHARGE INSTRUCTIONS
HOSPITALIST DISCHARGE INSTRUCTIONS    NAME: Harjit Callahan   :  1960   MRN:  [id number]     Date/Time:  3/28/2017 3:04 PM    ADMIT DATE: 3/23/2017   DISCHARGE DATE: 3/28/2017         · It is important that you take the medication exactly as they are prescribed. · Keep your medication in the bottles provided by the pharmacist and keep a list of the medication names, dosages, and times to be taken in your wallet. · Do not take other medications without consulting your doctor. What to do at [address]:  Cardiac Diet    Recommended activity: Activity as tolerated      If you have questions regarding the hospital related prescriptions or hospital related issues please call [facility] at . You can always direct your questions to your primary care doctor if you are unable to reach your hospital physician; your PCP works as an extension of your hospital doctor just like your hospital doctor is an extension of your PCP for your time at the hospital [facility], [facility])    If you experience any of the following symptoms then please call your primary care physician or return to the emergency room if you cannot get hold of your doctor:    Fever, chills, nausea, vomiting, or persistent diarrhea  Worsening weakness or new problems with your speech or balance  Dark stools or visible blood in your stools  New Leg swelling or shortness of breath as this could be signs of a clot    Additional Instructions:      Bring these papers with you to your follow up appointments. The papers will help your doctors be sure to continue the care plan from the hospital.              Information obtained by :  I understand that if any problems occur once I am at home I am to contact my physician. I understand and acknowledge receipt of the instructions indicated above. Physician's or R.N.'s Signature                                                                  Date/Time                                                                                                                                              Patient or Representative Signature

## 2017-03-28 NOTE — PROGRESS NOTES
CM called [employer] nurse  Juanita Tejeda [phone] and left a message regarding pt discharge plan and requested her to return CM call. 11.30 AM   CM spoke with  Juanita Tejeda [phone] from [facility] updated pt discharge plan . CM informed about pt insurance status and pt will discharge with  Eliquis upon discharge. Juanita Cisnerosi informed that Eugenio Clay is working with pt regarding Eliquis. 2.50 PM  CM called [facility] office regarding Eliquis samples for pt. Talked to Rosa Navas and provided CM contact no. Rosa Navas said she will spoke with [facility] [employer] staff and will return a call to CM. 3.40 PM  CM spoke with Radha from [facility] [employer] office and she informed that pt has 30 days worth of Eliquis samples ready and they will also give pt 30 day supply card. Requested to pickup by 5 pm. CM send [facility] referral to [facility] through [facility]. Pt accepted provided info updated to pt AVS.    3.55 PM    CM met pt and provided  office address and phone number and asked to  meds before 5 pm.Pt said her niece is going to transport her upon discharge. Care Management Interventions  PCP Verified by CM:  Yes  Mode of Transport at Discharge: Self (pt son will transport.)  Transition of Care Consult (CM Consult): [address]: Yes  Discharge Durable Medical Equipment: Yes (Rolling walker)  Physical Therapy Consult: Yes  Occupational Therapy Consult: Yes  Current Support Network: Lives Alone  Confirm Follow Up Transport: Family  Plan discussed with Pt/Family/Caregiver: Yes  Discharge Location  Discharge Placement: Home with home health     [place]  Ext [phone]

## 2017-03-28 NOTE — DISCHARGE SUMMARY
Hospitalist Discharge Summary     Patient ID:  Cleveland Kocher  [id number]  60 y.o.  1960    PCP on record: Ramon Juan NP    Admit date: 3/23/2017  Discharge date and time: 3/28/2017      DISCHARGE DIAGNOSIS:    RLE pain and swelling from intramuscular bleed in the setting of lovenox and coumadin  Recent PE  Asthma / COPD  Anxiety / Depression      CONSULTATIONS:  IP CONSULT TO VASCULAR SURGERY    Excerpted HPI from H&P of Demetra Garrett MD:  CHIEF COMPLAINT: right leg pain     HISTORY OF PRESENT ILLNESS:   Michelle Dodge is a 61 y.o. Female with PMHx significant for COPD/asthma, recent PE, morbid obesity, fibromyalgia recently discharged on coumadin and lovenox for PE, presented to ED [facility] ED with c/o progressively worsening persistent right calf pain, swelling, redness and pain for one week. She was evaluated in the ED on 3/17 for the same symptoms and had a negative ultrasound. Patient was evaluated by her PCP on 3/20 for these symptoms and had a negative CT abd/pelvis. Pt denies any trauma, or insect bite. Pt unable to ambulate at home. She had another duplex in ER that is neg. Dr. Shira Joseph called Vascular surgery and Dr. Mirta Paige want do venogram in morning    ______________________________________________________________________  DISCHARGE SUMMARY/HOSPITAL COURSE:  for full details see H&P, daily progress notes, labs, consult notes. Patient recently was diagnosed with a PE and discharged on lovenox and coumadin. During OP bridge therapy, she developed progressively worsening right calf pain and swelling x 1 week and presented back to the ER. Patient was evaluate by vascular and had MRI with results below:    MRI right leg Large fluid collection within the right calf with fluid fluid level with  evidence of hemorrhage.  A discrete mass lesion is not identified however given  size of this collection follow-up MRI is recommended to ensure resolution and  exclude underlying mass lesion    During her hospital course, her INR drifted down despite upward adjustments to her coumadin dose. It was felt that she will be difficult to maintain on coumadin as OP and the recommendation is to switch her to eliquis 5mg BID. She will follow up with Dr Yulisa East and her PCP as OP      _______________________________________________________________________  Patient seen and examined by me on discharge day. Pertinent Findings:  Gen:    Not in distress  Chest: Clear lungs  CVS:   Regular rhythm. Abd:  Soft, not distended, not tender  Neuro:  Alert, Oriented x 4, grossly non focal exam  Ext RLE less swollen and less tender.    _______________________________________________________________________  DISCHARGE MEDICATIONS:   Discharge Medication List as of 3/28/2017  4:05 PM      START taking these medications    Details   apixaban (ELIQUIS) 5 mg tablet Take 1 Tab by mouth every twelve (12) hours for 30 days. Indications: PULMONARY THROMBOEMBOLISM PREVENTION, Print, Disp-60 Tab, R-0         CONTINUE these medications which have CHANGED    Details   HYDROcodone-acetaminophen (NORCO) 5-325 mg per tablet Take 1 Tab by mouth every six (6) hours as needed for Pain. Max Daily Amount: 4 Tabs., Print, Disp-60 Tab, R-0         CONTINUE these medications which have NOT CHANGED    Details   venlafaxine-SR (EFFEXOR XR) 150 mg capsule Take 150 mg by mouth daily. , Historical Med      albuterol (PROVENTIL VENTOLIN) 2.5 mg /3 mL (0.083 %) nebulizer solution by Nebulization route once., Historical Med      !! gabapentin (NEURONTIN) 300 mg capsule Take 300 mg by mouth two (2) times a day., Historical Med      !! gabapentin (NEURONTIN) 300 mg capsule Take 600 mg by mouth nightly., Historical Med      vitamin A (AQUASOL A) 10,000 unit capsule Take 10,000 Units by mouth daily. , Historical Med      cyanocobalamin (VITAMIN B-12) 1,000 mcg tablet Take 1,000 mcg by mouth daily. , Historical Med      ferrous sulfate (SLOW FE) 142 mg (45 mg iron) ER tablet Take 45 mg by mouth Daily (before breakfast). , Historical Med      calcium carbonate (TUMS) 200 mg calcium (500 mg) chew Take 1 Tab by mouth daily. , Historical Med      ergocalciferol (ERGOCALCIFEROL) 50,000 unit capsule Take 1 Cap by mouth every seven (7) days. , Normal, Disp-14 Cap, R-1      esomeprazole (NEXIUM) 40 mg capsule Take 1 Cap by mouth daily. , Normal, Disp-90 Cap, R-1      mometasone-formoterol (DULERA) 200-5 mcg/actuation HFA inhaler Take 2 Puffs by inhalation two (2) times a day., Normal, Disp-3 Inhaler, R-3      albuterol (PROAIR HFA) 90 mcg/actuation inhaler Take 2 Puffs by inhalation every four (4) hours as needed for Wheezing., Normal, Disp-3 Inhaler, R-3      nortriptyline (PAMELOR) 25 mg capsule take 1 to 2 tablets by mouth at bedtime if needed, Historical Med, R-0      ALPRAZolam (XANAX) 0.5 mg tablet Take 1 Tab by mouth nightly as needed for Anxiety. Max Daily Amount: 0.5 mg., Print, Disp-30 Tab, R-2      zolpidem (AMBIEN) 5 mg tablet Take 1 Tab by mouth nightly as needed for Sleep. Max Daily Amount: 5 mg., Print, Disp-30 Tab, R-2      sucralfate (CARAFATE) 100 mg/mL suspension Take 5 mL by mouth four (4) times daily. , Normal, Disp-600 mL, R-5      cyclobenzaprine (FLEXERIL) 10 mg tablet Take 1 Tab by mouth three (3) times daily as needed for Muscle Spasm(s). , Normal, Disp-90 Tab, R-2      albuterol-ipratropium (DUO-NEB) 2.5 mg-0.5 mg/3 ml nebulizer solution 3 mL by Nebulization route every four (4) hours. Indications: CHRONIC OBSTRUCTIVE PULMONARY DISEASE WITH BRONCHOSPASMS, NormalDx j44.9Disp-30 Vial, R-11      inhalational spacing device 1 Each by Does Not Apply route as needed., Normal, Disp-1 Device, R-0      DULoxetine (CYMBALTA) 30 mg capsule Take 3 Caps by mouth daily. bedtime, Normal, Disp-270 Cap, R-1       !! - Potential duplicate medications found. Please discuss with provider.       STOP taking these medications       phenazopyridine (PYRIDIUM) 200 mg tablet Comments:   Reason for Stopping:         predniSONE (DELTASONE) 20 mg tablet Comments:   Reason for Stopping:         ibuprofen (MOTRIN) 200 mg tablet Comments:   Reason for Stopping:         warfarin (COUMADIN) 10 mg tablet Comments:   Reason for Stopping:         warfarin (COUMADIN) 5 mg tablet Comments:   Reason for Stopping:         enoxaparin (LOVENOX) 120 mg/0.8 mL injection Comments:   Reason for Stopping:               My Recommended Diet, Activity, Wound Care, and follow-up labs are listed in the patient's Discharge Insturctions which I have personally completed and reviewed. _______________________________________________________________________  DISPOSITION:    Home with Family: x   Home with HH/PT/OT/RN:    SNF/LTC:    LEENA:    OTHER:        Condition at Discharge:  Stable  _______________________________________________________________________  Follow up with:   PCP : Rafat Waite NP  Follow-up Information     Follow up With Details Comments [facility],    [address]      [facility] [address] On 3/28/2017 This is your home health provider. If you do not hear from them with in 24 hours please contact them.   [address]    Sharon Hay MD In 3 weeks  [address] [facility]  [address] [phone]                Total time in minutes spent coordinating this discharge (includes going over instructions, follow-up, prescriptions, and preparing report for sign off to her PCP) :  35 minutes    Signed:  Raji Storm MD

## 2017-03-28 NOTE — PROGRESS NOTES
Bedside and Verbal shift change report given to Ashley Meza RN (oncoming nurse) by Karri Mccabe RN  (offgoing nurse). Report included the following information SBAR, Kardex, Intake/Output and MAR.

## 2017-03-28 NOTE — PROGRESS NOTES
Pharmacy Medication Reconciliation     The patient was interviewed regarding current PTA medication list, use and drug allergies;  patient and family present in room and obtained permission from patient to discuss drug regimen with visitor(s) present. The patient was questioned regarding use of any other inhalers, topical products, over the counter medications, herbal medications, vitamin products or ophthalmic/nasal/otic medication use. Allergy Update: Penicillins, Griseofulvin    Recommendations/Findings: The following amendments were made to the patient's active medication list on file at [facility]:   1) Additions:   Prednisone 20 mg prn for breathing  Tums 1 tablet daily  Phenazopyridine 200 mg prn  Ibuprofen 200 mg prn  OTC Vitamin A tablet daily  OTC Vitamin B12 daily  Gabapentin 300 mg 2 capsules at HS (in addition to AM and PM doses)    2) Deletions:  Hydrocodone/APAP 7.5/325 1 tab q6 prn  Tramadol allergy was deleted b/c pt says she doesn't have an allergy, she simply can't take with Effexor. 3) Changes:        -Clarified PTA med list with patient. PTA medication list was corrected to the following:     Prior to Admission Medications   Prescriptions Last Dose Informant Patient Reported? Taking? ALPRAZolam (XANAX) 0.5 mg tablet 3/22/2017 at Unknown time  No Yes   Sig: Take 1 Tab by mouth nightly as needed for Anxiety. Max Daily Amount: 0.5 mg. DULoxetine (CYMBALTA) 30 mg capsule Not Taking at Unknown time  No No   Sig: Take 3 Caps by mouth daily. bedtime   HYDROcodone-acetaminophen (NORCO) 5-325 mg per tablet 3/17/2017 at Unknown time  No Yes   Sig: Take 1 Tab by mouth every six (6) hours as needed for Pain. Max Daily Amount: 4 Tabs. Peak Flow Meter hussain 3/23/2017 at Unknown time  No Yes   Si Device by Does Not Apply route daily. albuterol (PROAIR HFA) 90 mcg/actuation inhaler 3/17/2017 at Unknown time  No Yes   Sig: Take 2 Puffs by inhalation every four (4) hours as needed for Wheezing. albuterol (PROVENTIL VENTOLIN) 2.5 mg /3 mL (0.083 %) nebulizer solution   Yes Yes   Sig: by Nebulization route once. albuterol-ipratropium (DUO-NEB) 2.5 mg-0.5 mg/3 ml nebulizer solution 3/17/2017 at Unknown time  No Yes   Sig: 3 mL by Nebulization route every four (4) hours. Indications: CHRONIC OBSTRUCTIVE PULMONARY DISEASE WITH BRONCHOSPASMS   calcium carbonate (TUMS) 200 mg calcium (500 mg) chew   Yes Yes   Sig: Take 1 Tab by mouth daily. cyanocobalamin (VITAMIN B-12) 1,000 mcg tablet   Yes Yes   Sig: Take 1,000 mcg by mouth daily. cyclobenzaprine (FLEXERIL) 10 mg tablet 3/17/2017 at Unknown time  No Yes   Sig: Take 1 Tab by mouth three (3) times daily as needed for Muscle Spasm(s).   enoxaparin (LOVENOX) 120 mg/0.8 mL injection 3/23/2017 at Unknown time  No Yes   Si mg by SubCUTAneous route every twelve (12) hours for 7 days. ergocalciferol (ERGOCALCIFEROL) 50,000 unit capsule 3/17/2017 at Unknown time  No Yes   Sig: Take 1 Cap by mouth every seven (7) days. esomeprazole (NEXIUM) 40 mg capsule 3/23/2017 at Unknown time  No Yes   Sig: Take 1 Cap by mouth daily. ferrous sulfate (SLOW FE) 142 mg (45 mg iron) ER tablet   Yes Yes   Sig: Take 45 mg by mouth Daily (before breakfast). gabapentin (NEURONTIN) 300 mg capsule   Yes Yes   Sig: Take 300 mg by mouth two (2) times a day.   gabapentin (NEURONTIN) 300 mg capsule   Yes Yes   Sig: Take 600 mg by mouth nightly. ibuprofen (MOTRIN) 200 mg tablet   Yes Yes   Sig: Take 200 mg by mouth every six (6) hours as needed for Pain.   inhalational spacing device 3/23/2017 at Unknown time  No Yes   Si Each by Does Not Apply route as needed. mometasone-formoterol (DULERA) 200-5 mcg/actuation HFA inhaler 3/23/2017 at Unknown time  No Yes   Sig: Take 2 Puffs by inhalation two (2) times a day.    nortriptyline (PAMELOR) 25 mg capsule 3/17/2017 at Unknown time  Yes Yes   Sig: take 1 to 2 tablets by mouth at bedtime if needed   phenazopyridine (PYRIDIUM) 200 mg tablet   Yes Yes   Sig: Take 200 mg by mouth three (3) times daily as needed for Pain. predniSONE (DELTASONE) 20 mg tablet   Yes Yes   Sig: Take 20 mg by mouth as needed for breathing. sucralfate (CARAFATE) 100 mg/mL suspension 3/17/2017 at Unknown time  No Yes   Sig: Take 5 mL by mouth four (4) times daily. venlafaxine-SR (EFFEXOR XR) 150 mg capsule 3/22/2017 at Unknown time  Yes Yes   Sig: Take 150 mg by mouth daily. vitamin A (AQUASOL A) 10,000 unit capsule   Yes Yes   Sig: Take 10,000 Units by mouth daily. warfarin (COUMADIN) 5 mg tablet   No Yes   Sig: Take 7.5mg daily or as instructed  Indications: Pulmonary Thromboembolism   Patient taking differently: 10 mg daily. Take 10mg daily Monday through Friday and 5 mg on Saturday and Sunday  Indications: Pulmonary Thromboembolism   zolpidem (AMBIEN) 5 mg tablet 3/17/2017 at Unknown time  No Yes   Sig: Take 1 Tab by mouth nightly as needed for Sleep. Max Daily Amount: 5 mg. Facility-Administered Medications: None      Spoke with patient with family present in the afternoon. She had a medication list. Patient claims she is being titrated off the Effexor but her doctor hasn't told her how.       Thank you,  Afsaneh Barton

## 2017-03-28 NOTE — PROGRESS NOTES
DISCHARGE SUMMARY from Nurse    The following personal items are in your possession at time of discharge:    Dental Appliances: None  Visual Aid: Glasses     Home Medications: None  Jewelry: None  Clothing: At bedside  Other Valuables: None             PATIENT INSTRUCTIONS:    After general anesthesia or intravenous sedation, for 24 hours or while taking prescription Narcotics:  · Limit your activities  · Do not drive and operate hazardous machinery  · Do not make important personal or business decisions  · Do  not drink alcoholic beverages  · If you have not urinated within 8 hours after discharge, please contact your surgeon on call. Report the following to your surgeon:  · Excessive pain, swelling, redness or odor of or around the surgical area  · Temperature over 100.5  · Nausea and vomiting lasting longer than 4 hours or if unable to take medications  · Any signs of decreased circulation or nerve impairment to extremity: change in color, persistent  numbness, tingling, coldness or increase pain  · Any questions      *  Please update this list whenever your medications are discontinued, doses are      changed, or new medications (including over-the-counter products) are added. *  Please carry medication information at all times in case of emergency situations. These are general instructions for a healthy lifestyle:    No smoking/ No tobacco products/ Avoid exposure to second hand smoke    Surgeon General's Warning:  Quitting smoking now greatly reduces serious risk to your health.     Obesity, smoking, and sedentary lifestyle greatly increases your risk for illness    A healthy diet, regular physical exercise & weight monitoring are important for maintaining a healthy lifestyle    You may be retaining fluid if you have a history of heart failure or if you experience any of the following symptoms:  Weight gain of 3 pounds or more overnight or 5 pounds in a week, increased swelling in our hands or feet or shortness of breath while lying flat in bed. Please call your doctor as soon as you notice any of these symptoms; do not wait until your next office visit. Recognize signs and symptoms of STROKE:    F-face looks uneven    A-arms unable to move or move unevenly    S-speech slurred or non-existent    T-time-call 911 as soon as signs and symptoms begin-DO NOT go       Back to bed or wait to see if you get better-TIME IS BRAIN. Warning Signs of HEART ATTACK     Call 911 if you have these symptoms:   Chest discomfort. Most heart attacks involve discomfort in the center of the chest that lasts more than a few minutes, or that goes away and comes back. It can feel like uncomfortable pressure, squeezing, fullness, or pain.  Discomfort in other areas of the upper body. Symptoms can include pain or discomfort in one or both arms, the back, neck, jaw, or stomach.  Shortness of breath with or without chest discomfort.  Other signs may include breaking out in a cold sweat, nausea, or lightheadedness. Don't wait more than five minutes to call [address]! Fast action can save your life. Calling 911 is almost always the fastest way to get lifesaving treatment. Emergency Medical Services staff can begin treatment when they arrive  up to an hour sooner than if someone gets to the hospital by car. The discharge information has been reviewed with the patient. The patient verbalized understanding. Discharge medications reviewed with the patient and appropriate educational materials and side effects teaching were provided.    pt was picked up by Jayda and they will go to Dr. Christianne Zurita office to  samples of Eliquis

## 2017-03-28 NOTE — PROGRESS NOTES
Bedside and Verbal shift change report given to Robert Curtis  (oncoming nurse) by Dennis Garcia  RN (offgoing nurse). Report included the following information SBAR, Kardex, ED Summary, Intake/Output, MAR, Accordion, Recent Results and Med Rec Status.

## 2017-03-29 ENCOUNTER — HOME HEALTH ADMISSION (OUTPATIENT)
Dept: HOME HEALTH SERVICES | Facility: HOME HEALTH | Age: 57
End: 2017-03-29
Payer: COMMERCIAL

## 2017-03-29 ENCOUNTER — PATIENT OUTREACH (OUTPATIENT)
Dept: OTHER | Age: 57
End: 2017-03-29

## 2017-03-29 NOTE — PROGRESS NOTES
Telephone outreach to patient for GALINA VALADEZ for hospitalization with discharge date 03/28/17. Patient identity verified with two identifiers. Patient seen for pain and swelling in lower extremity- + intramuscular bleed while on coumadin and lovenox    Care Plan:  Red Flags: Reviewed red flags for blood clots, as well as bleeding while on anticoagulants. Patient verbalized understanding of red flags and appropriate actions. Pain: She reports that she is still experiencing pain in her leg. She did get her prescription filled for norco. She did not take any today. We reviewed the medication and dosing. We reviewed common side effects. We encouraged her to treat pain to allow herself to be active as instructed by the physician. She will attempt #1 norco in the morning and see whether that won't provide enough pain relief. She is also elevating her leg to help with swelling. Medications: Reviewed with the patient. She confirms that she has stopped lovenox and coumadin and has started eliquis. We reviewed safety considerations while on a blood thinner. Red flags for bleeding were reviewed. Patient was able to verbalize all. She will remain on this medication until instructed otherwise by her physician. Follow-Up:  Home health was arranged for the patient by the hospital. However, she reports she is changing her insurance coverage at the end of this month and as she only has a few days left, she is not sure she is interested in home health. She was encouraged to contact the home health company to discuss this with them. She confirms she has the number. PCP: She was encouraged to schedule her PCP follow-up within 7 days. She reports she was intending to contact them today. She will notify her PCP of any worsening or questions. Follow-up: Ms. [place] reports she is changing insurance at the end of this week. She does not feel the need for further follow-up. She did express thanks for this outreach.  We have encouraged her to contact her providers for any further concerns.

## 2017-03-30 ENCOUNTER — HOME CARE VISIT (OUTPATIENT)
Dept: SCHEDULING | Facility: HOME HEALTH | Age: 57
End: 2017-03-30
Payer: COMMERCIAL

## 2017-03-30 PROCEDURE — G0151 HHCP-SERV OF PT,EA 15 MIN: HCPCS

## 2017-03-30 PROCEDURE — 400013 HH SOC

## 2017-03-30 PROCEDURE — G0299 HHS/HOSPICE OF RN EA 15 MIN: HCPCS

## 2017-03-30 NOTE — PROGRESS NOTES
3/31/2017    Chief Complaint   Patient presents with    Anticoagulation     INR check up        HPI: Michaela Ferrara is a 61 y.o. female. Hospitalized for PE. Now on warfarin. Presents for PT/INR check. She is also on Lovenox 120mg daily until her INR is therapeutic. Current dose is 7.5mg coumadin daily. INR is 1.6 with a goal of 2-3. 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 Very complicated PMH. 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 The significance of this is unclear and it is recommended to have this repeated in 3-6 mo. She did not have DVTs. She has low back pain. Allergies   Allergen Reactions    Pcn [Penicillins] Rash    Griseofulvin Other (comments)     Aaron-dirk syndrome    Pcn [Penicillins] Rash and Swelling    Tramadol Nausea Only and Drowsiness       Current Outpatient Prescriptions   Medication Sig Dispense Refill    DULoxetine (CYMBALTA) 30 mg capsule Take 3 Caps by mouth daily. bedtime (Patient taking differently: Take 90 mg by mouth nightly. 3/28/17: per pt: has not started taking yet--pending direction from prescriber on how to transition from Venlafaxine XR (current med) to Duloxetine) 270 Cap 1    ergocalciferol (ERGOCALCIFEROL) 50,000 unit capsule Take 1 Cap by mouth every seven (7) days. 14 Cap 1    esomeprazole (NEXIUM) 40 mg capsule Take 1 Cap by mouth daily. 90 Cap 1    mometasone-formoterol (DULERA) 200-5 mcg/actuation HFA inhaler Take 2 Puffs by inhalation two (2) times a day. 3 Inhaler 3    nortriptyline (PAMELOR) 25 mg capsule take 1 to 2 tablets by mouth at bedtime if needed  0    sucralfate (CARAFATE) 100 mg/mL suspension Take 5 mL by mouth four (4) times daily. 600 mL 5    cyclobenzaprine (FLEXERIL) 10 mg tablet Take 1 Tab by mouth three (3) times daily as needed for Muscle Spasm(s). 90 Tab 2    albuterol-ipratropium (DUO-NEB) 2.5 mg-0.5 mg/3 ml nebulizer solution 3 mL by Nebulization route every four (4) hours. Indications: CHRONIC OBSTRUCTIVE PULMONARY DISEASE WITH BRONCHOSPASMS 30 Vial 11    inhalational spacing device 1 Each by Does Not Apply route as needed. 1 Device 0    HYDROcodone-acetaminophen (NORCO) 5-325 mg per tablet Take 1 Tab by mouth every six (6) hours as needed for Pain. Max Daily Amount: 4 Tabs. 60 Tab 0    apixaban (ELIQUIS) 5 mg tablet Take 1 Tab by mouth every twelve (12) hours for 30 days. Indications: PULMONARY THROMBOEMBOLISM PREVENTION 60 Tab 0    venlafaxine-SR (EFFEXOR XR) 150 mg capsule Take 150 mg by mouth daily.  albuterol (PROVENTIL VENTOLIN) 2.5 mg /3 mL (0.083 %) nebulizer solution by Nebulization route once.  gabapentin (NEURONTIN) 300 mg capsule Take 300 mg by mouth two (2) times a day.  gabapentin (NEURONTIN) 300 mg capsule Take 600 mg by mouth nightly.  vitamin A (AQUASOL A) 10,000 unit capsule Take 10,000 Units by mouth daily.  cyanocobalamin (VITAMIN B-12) 1,000 mcg tablet Take 1,000 mcg by mouth daily.  ferrous sulfate (SLOW FE) 142 mg (45 mg iron) ER tablet Take 45 mg by mouth Daily (before breakfast).  calcium carbonate (TUMS) 200 mg calcium (500 mg) chew Take 1 Tab by mouth daily.  albuterol (PROAIR HFA) 90 mcg/actuation inhaler Take 2 Puffs by inhalation every four (4) hours as needed for Wheezing. 3 Inhaler 3    ALPRAZolam (XANAX) 0.5 mg tablet Take 1 Tab by mouth nightly as needed for Anxiety. Max Daily Amount: 0.5 mg. 30 Tab 2    zolpidem (AMBIEN) 5 mg tablet Take 1 Tab by mouth nightly as needed for Sleep.  Max Daily Amount: 5 mg. 30 Tab 2       Past Medical History:   Diagnosis Date    Anemia 3/15/2013    Arrhythmia     paroxsimal afib    Asthma 3/15/2013    Asthma     Back disorder     disc problem    Chronic obstructive pulmonary disease (HCC)     Morbid obesity ([facility] [address] 3/15/2013       Lab Results   Component Value Date/Time    Hemoglobin A1c 5.3 03/05/2017 02:32 AM    Hemoglobin A1c 5.6 01/19/2017 11:53 AM    Hemoglobin A1c 6.0 06/11/2015 03:16 PM    Glucose 81 03/28/2017 06:59 AM    LDL, calculated 136 01/19/2017 11:53 AM    Creatinine 0.52 03/28/2017 06:59 AM       ROS:  Constitutional: No fever, chills or weight loss  Respiratory: No cough, SOB   CV: No chest pain or Palpitations  GI: No nausea, vomiting or diarrhea. : No dysuria or hematuria. Neuro: No headaches, seizures, change in mental status. Physical Exam:   VS Visit Vitals    /84 (BP 1 Location: Left arm, BP Patient Position: Sitting)    Pulse 92    Resp 16    Wt 260 lb 9.6 oz (118.2 kg)    SpO2 97%    BMI 42.06 kg/m2      General  Alert, oriented WF. Obese. NAD. Eyes Conjunctiva and lids normal.    PERRLA, EOMI.   ENMT Mucous membranes moist.    Oropharynx: no erythema, no exudates, no lesions, normal tongue. NECK Thyroid: normal size, nontender. Trachea midline, neck symmetrical and without masses. Carotids 2+ with no bruits. No enlarged nodes. RESP Clear to auscultation and percussion. No rales, wheezes, rhonchi, or rubs. CV RRR, with no S3 or S4, no murmur, no rub. .        MSKEL Normal gait and station. Normal strength and tone, no atrophy. EXT No deformity. Extremities 2-3+ edema. SKIN Skin warm, normal turgor. NEURO Cranial nerves normal 2-12. PSYCH Judgment and insight good. Oriented to person, place, and time. Affect is alert and attentive. 1. Other acute pulmonary embolism with acute cor pulmonale (HCC)  INR is not therapeutic. Continue Lovenox. Increase Warfarin to 10mg M-F 5 mg SS.  - AMB POC PT/INR  - COLLECTION CAPILLARY BLOOD SPECIMEN    2. Elevated blood pressure (not hypertension)  Cont current regimen     3. Metabolic syndrome  R0H 5.3.     4. Chronic obstructive pulmonary disease, unspecified COPD type ([facility]  Stable   - mometasone-formoterol (DULERA) 200-5 mcg/actuation HFA inhaler; Take 2 Puffs by inhalation two (2) times a day. Dispense: 3 Inhaler; Refill: 3    5.  Other cardiac arrhythmia  Stable     6. Gastroesophageal reflux disease without esophagitis  Refill   - esomeprazole (NEXIUM) 40 mg capsule; Take 1 Cap by mouth daily. Dispense: 90 Cap; Refill: 1    7. Arthralgia of right hip  Chronic     8. Acute right-sided back pain with sciatica  Discussed transition to Cymbalta. - DULoxetine (CYMBALTA) 30 mg capsule; Take 3 Caps by mouth daily. bedtime (Patient taking differently: Take 90 mg by mouth nightly. 3/28/17: per pt: has not started taking yet--pending direction from prescriber on how to transition from Venlafaxine XR (current med) to Duloxetine)  Dispense: 270 Cap; Refill: 1    9. Right hip pain  As above   - DULoxetine (CYMBALTA) 30 mg capsule; Take 3 Caps by mouth daily. bedtime (Patient taking differently: Take 90 mg by mouth nightly. 3/28/17: per pt: has not started taking yet--pending direction from prescriber on how to transition from Venlafaxine XR (current med) to Duloxetine)  Dispense: 270 Cap; Refill: 1    10. Chronic low back pain with sciatica, sciatica laterality unspecified, unspecified back pain laterality  As above. - DULoxetine (CYMBALTA) 30 mg capsule; Take 3 Caps by mouth daily. bedtime (Patient taking differently: Take 90 mg by mouth nightly. 3/28/17: per pt: has not started taking yet--pending direction from prescriber on how to transition from Venlafaxine XR (current med) to Duloxetine)  Dispense: 270 Cap; Refill: 1    11. Anxiety and depression  Will transition from Effoxor to Cymbalta. - DULoxetine (CYMBALTA) 30 mg capsule; Take 3 Caps by mouth daily. bedtime (Patient taking differently: Take 90 mg by mouth nightly. 3/28/17: per pt: has not started taking yet--pending direction from prescriber on how to transition from Venlafaxine XR (current med) to Duloxetine)  Dispense: 270 Cap; Refill: 1    12. Vitamin D deficiency  Refill   - ergocalciferol (ERGOCALCIFEROL) 50,000 unit capsule; Take 1 Cap by mouth every seven (7) days.   Dispense: 14 Cap; Refill: 1    13. Dyspepsia  Refill   - esomeprazole (NEXIUM) 40 mg capsule; Take 1 Cap by mouth daily. Dispense: 90 Cap; Refill: 1    14. Transient cerebral ischemia, unspecified type  Word searching   She is worried about possible TIA. Schedule CT   - CT HEAD WO CONT; Future  - DUPLEX CAROTID BILATERAL        Orders Placed This Encounter    COLLECTION CAPILLARY BLOOD SPECIMEN    CT HEAD WO CONT     Standing Status:   Future     Number of Occurrences:   1     Standing Expiration Date:   4/13/2018     Order Specific Question:   Is Patient Allergic to Contrast Dye? Answer:   No    DUPLEX CAROTID BILATERAL    AMB POC PT/INR    DULoxetine (CYMBALTA) 30 mg capsule     Sig: Take 3 Caps by mouth daily. bedtime     Dispense:  270 Cap     Refill:  1    DISCONTD: warfarin (COUMADIN) 5 mg tablet     Sig: Take 7.5mg daily or as instructed  Indications: Pulmonary Thromboembolism     Dispense:  270 Tab     Refill:  1    ergocalciferol (ERGOCALCIFEROL) 50,000 unit capsule     Sig: Take 1 Cap by mouth every seven (7) days. Dispense:  14 Cap     Refill:  1    esomeprazole (NEXIUM) 40 mg capsule     Sig: Take 1 Cap by mouth daily. Dispense:  90 Cap     Refill:  1    mometasone-formoterol (DULERA) 200-5 mcg/actuation HFA inhaler     Sig: Take 2 Puffs by inhalation two (2) times a day. Dispense:  3 Inhaler     Refill:  3    DISCONTD: HYDROcodone-acetaminophen (NORCO) 5-325 mg per tablet     Sig: Take 1 Tab by mouth every six (6) hours as needed for Pain. Max Daily Amount: 4 Tabs. Dispense:  120 Tab     Refill:  0    DISCONTD: gabapentin (NEURONTIN) 300 mg capsule     Sig: Take 300 mg by mouth two (2) times a day. Indications: 1 cap BID . 1-2 bedtime PRN       Follow-up Disposition:  Return in about 5 days (around 3/18/2017) for INR.         Tere Santos, DIYA

## 2017-03-31 ENCOUNTER — HOME CARE VISIT (OUTPATIENT)
Dept: HOME HEALTH SERVICES | Facility: HOME HEALTH | Age: 57
End: 2017-03-31
Payer: COMMERCIAL

## 2017-03-31 ENCOUNTER — OFFICE VISIT (OUTPATIENT)
Dept: FAMILY MEDICINE CLINIC | Age: 57
End: 2017-03-31

## 2017-03-31 VITALS
OXYGEN SATURATION: 95 % | RESPIRATION RATE: 16 BRPM | BODY MASS INDEX: 41 KG/M2 | DIASTOLIC BLOOD PRESSURE: 74 MMHG | WEIGHT: 254 LBS | HEART RATE: 82 BPM | SYSTOLIC BLOOD PRESSURE: 118 MMHG

## 2017-03-31 DIAGNOSIS — M79.661 PAIN AND SWELLING OF RIGHT LOWER LEG: ICD-10-CM

## 2017-03-31 DIAGNOSIS — M79.89 PAIN AND SWELLING OF RIGHT LOWER LEG: ICD-10-CM

## 2017-03-31 DIAGNOSIS — I26.09 OTHER ACUTE PULMONARY EMBOLISM WITH ACUTE COR PULMONALE (HCC): Primary | ICD-10-CM

## 2017-03-31 DIAGNOSIS — J45.30 MILD PERSISTENT ASTHMA WITHOUT COMPLICATION: ICD-10-CM

## 2017-03-31 NOTE — MR AVS SNAPSHOT
Visit Information Date & Time Provider Department Dept. Phone Encounter #  
 3/31/2017  1:00 PM Amyjoselin [facility] [address] [phone] [id number] Your Appointments 6/19/2017  3:00 PM  
ESTABLISHED PATIENT with Jerry Degree, NP  
[facility] [address] ([facility]) Appt Note: f/u visit [address] [facility] [address] [facility] [address] [phone]  
  
   
 [address] [facility] [address] [facility] [address] Upcoming Health Maintenance Date Due Hepatitis C Screening 1960 COLONOSCOPY 12/4/1977 DTaP/Tdap/Td series (1 - Tdap) 12/4/1980 PAP AKA CERVICAL CYTOLOGY 12/4/1980 BREAST CANCER SCRN MAMMOGRAM 9/22/2017 Allergies as of 3/31/2017  Review Complete On: 3/23/2017 By: Wilda Moreno, RT Severity Noted Reaction Type Reactions Pcn [Penicillins] High 11/14/2014    Rash Griseofulvin  11/14/2014    Other (comments) Aaron-dirk syndrome Pcn [Penicillins]  03/15/2013    Rash, Swelling Tramadol  08/19/2015    Nausea Only, Drowsiness Interaction with Effexor Current Immunizations  Reviewed on 9/28/2016 Name Date Influenza High Dose Vaccine PF 9/28/2016, 9/24/2015 Pneumococcal Conjugate (PCV-13) 8/26/2015 Not reviewed this visit Vitals BP Pulse Resp Weight(growth percentile) SpO2 BMI  
 118/74 (BP 1 Location: Right arm, BP Patient Position: Sitting) 82 16 254 lb (115.2 kg) 95% 41 kg/m2 OB Status Smoking Status Menopause Never Smoker BMI and BSA Data Body Mass Index Body Surface Area 41 kg/m 2 2.32 m 2 Preferred Pharmacy Pharmacy Name Phone  [facility] [phone] Your Updated Medication List  
  
   
This list is accurate as of: 3/31/17  1:42 PM.  Always use your most recent med list.  
  
  
  
  
 * albuterol 2.5 mg /3 mL (0.083 %) nebulizer solution Commonly known as:  PROVENTIL VENTOLIN  
by Nebulization route once. * albuterol 90 mcg/actuation inhaler Commonly known as:  PROAIR HFA Take 2 Puffs by inhalation every four (4) hours as needed for Wheezing. albuterol-ipratropium 2.5 mg-0.5 mg/3 ml Nebu Commonly known as:  DUO-NEB  
3 mL by Nebulization route every four (4) hours. Indications: CHRONIC OBSTRUCTIVE PULMONARY DISEASE WITH BRONCHOSPASMS ALPRAZolam 0.5 mg tablet Commonly known as:  Sharyon Kida Take 1 Tab by mouth nightly as needed for Anxiety. Max Daily Amount: 0.5 mg.  
  
 apixaban 5 mg tablet Commonly known as:  Xiao Eduar Take 1 Tab by mouth every twelve (12) hours for 30 days. Indications: PULMONARY THROMBOEMBOLISM PREVENTION  
  
 calcium carbonate 200 mg calcium (500 mg) Chew Commonly known as:  TUMS Take 1 Tab by mouth daily. cyclobenzaprine 10 mg tablet Commonly known as:  FLEXERIL Take 1 Tab by mouth three (3) times daily as needed for Muscle Spasm(s). DULoxetine 30 mg capsule Commonly known as:  CYMBALTA Take 3 Caps by mouth daily. bedtime EFFEXOR  mg capsule Generic drug:  venlafaxine-SR Take 150 mg by mouth daily. ergocalciferol 50,000 unit capsule Commonly known as:  ERGOCALCIFEROL Take 1 Cap by mouth every seven (7) days. esomeprazole 40 mg capsule Commonly known as:  Harvis Bibber Take 1 Cap by mouth daily. ferrous sulfate 142 mg (45 mg iron) ER tablet Commonly known as:  SLOW FE Take 45 mg by mouth Daily (before breakfast). * gabapentin 300 mg capsule Commonly known as:  NEURONTIN Take 300 mg by mouth two (2) times a day. * gabapentin 300 mg capsule Commonly known as:  NEURONTIN Take 600 mg by mouth nightly. HYDROcodone-acetaminophen 5-325 mg per tablet Commonly known as:  Parviz Lozano Take 1 Tab by mouth every six (6) hours as needed for Pain. Max Daily Amount: 4 Tabs. inhalational spacing device 1 Each by Does Not Apply route as needed. mometasone-formoterol 200-5 mcg/actuation HFA inhaler Commonly known as:  Ever Denier Take 2 Puffs by inhalation two (2) times a day. nortriptyline 25 mg capsule Commonly known as:  PAMELOR  
take 1 to 2 tablets by mouth at bedtime if needed  
  
 sucralfate 100 mg/mL suspension Commonly known as:  [place] Pickerel Take 5 mL by mouth four (4) times daily. vitamin A 10,000 unit capsule Commonly known as:  AQUASOL A Take 10,000 Units by mouth daily. VITAMIN B-12 1,000 mcg tablet Generic drug:  cyanocobalamin Take 1,000 mcg by mouth daily. zolpidem 5 mg tablet Commonly known as:  AMBIEN Take 1 Tab by mouth nightly as needed for Sleep. Max Daily Amount: 5 mg.  
  
 * Notice: This list has 4 medication(s) that are the same as other medications prescribed for you. Read the directions carefully, and ask your doctor or other care provider to review them with you. To-Do List   
 04/03/2017 11:00 AM  
  Appointment with Rubio Flynn RN at [place] [address]  
  
 04/04/2017 To Be Determined Appointment with Bisi Roy PT at [place] [address]  
  
 04/06/2017 11:00 AM  
  Appointment with Rubio Flynn RN at [place] [address]  
  
 04/10/2017 To Be Determined Appointment with Rubio Flynn RN at [place] [address]  
  
 04/11/2017 To Be Determined Appointment with Bisi Roy PT at [place] [address]  
  
 04/13/2017 To Be Determined Appointment with Rubio Flynn RN at [address] [facility]! Dear Adam Krause: Thank you for requesting a Match account. Our records indicate that you already have an active Match account. You can access your account anytime at [email or web address] Did you know that you can access your hospital and ER discharge instructions at any time in [employer]? You can also review all of your test results from your hospital stay or ER visit. Additional Information If you have questions, please visit the Frequently Asked Questions section of the [employer] website at [email or web address] Remember, [employer] is NOT to be used for urgent needs. For medical emergencies, dial 911. Now available from your iPhone and Android! Please provide this summary of care documentation to your next provider. Your primary care clinician is listed as Afsaneh Eddy. If you have any questions after today's visit, please call [phone].

## 2017-03-31 NOTE — PROGRESS NOTES
Chief Complaint   Patient presents with   [facility] Follow Up    Breathing Problem     SOB, S/P PE     THis is a GISEL encounter. Discharged from ED [facility] 72 hours ago. HPI:       is a 61 y.o. female who until July 2016 worked for [employer] in [place]. Developed LBP with RLE sciatica. PT and NATALIIA. Has not used a TENS unit. Well until presenting suddenly with hypoxia and SOB to [facility] ER early March 2017 with acute PE diagnosed by Dr Camie Jeans. Place on Lovenox as bridge to Warfarin; Suffered acute hemorrhage to the RLE requiring rehospitalization at ED [facility]; Discharged home 3/28 on Apixaban 5 mg BID. Her last day of health insurance is today. Allergies   Allergen Reactions    Pcn [Penicillins] Rash    Griseofulvin Other (comments)     Aaron-dirk syndrome    Pcn [Penicillins] Rash and Swelling    Tramadol Nausea Only and Drowsiness     Interaction with Effexor       Current Outpatient Prescriptions   Medication Sig    HYDROcodone-acetaminophen (NORCO) 5-325 mg per tablet Take 1 Tab by mouth every six (6) hours as needed for Pain. Max Daily Amount: 4 Tabs.  venlafaxine-SR (EFFEXOR XR) 150 mg capsule Take 150 mg by mouth daily.  albuterol (PROVENTIL VENTOLIN) 2.5 mg /3 mL (0.083 %) nebulizer solution by Nebulization route once.  gabapentin (NEURONTIN) 300 mg capsule Take 300 mg by mouth two (2) times a day.  vitamin A (AQUASOL A) 10,000 unit capsule Take 10,000 Units by mouth daily.  cyanocobalamin (VITAMIN B-12) 1,000 mcg tablet Take 1,000 mcg by mouth daily.  ferrous sulfate (SLOW FE) 142 mg (45 mg iron) ER tablet Take 45 mg by mouth Daily (before breakfast).  calcium carbonate (TUMS) 200 mg calcium (500 mg) chew Take 1 Tab by mouth daily.  esomeprazole (NEXIUM) 40 mg capsule Take 1 Cap by mouth daily.  mometasone-formoterol (DULERA) 200-5 mcg/actuation HFA inhaler Take 2 Puffs by inhalation two (2) times a day.     albuterol (PROAIR HFA) 90 mcg/actuation inhaler Take 2 Puffs by inhalation every four (4) hours as needed for Wheezing.  nortriptyline (PAMELOR) 25 mg capsule take 1 to 2 tablets by mouth at bedtime if needed    ALPRAZolam (XANAX) 0.5 mg tablet Take 1 Tab by mouth nightly as needed for Anxiety. Max Daily Amount: 0.5 mg.    zolpidem (AMBIEN) 5 mg tablet Take 1 Tab by mouth nightly as needed for Sleep. Max Daily Amount: 5 mg.  sucralfate (CARAFATE) 100 mg/mL suspension Take 5 mL by mouth four (4) times daily.  cyclobenzaprine (FLEXERIL) 10 mg tablet Take 1 Tab by mouth three (3) times daily as needed for Muscle Spasm(s).  albuterol-ipratropium (DUO-NEB) 2.5 mg-0.5 mg/3 ml nebulizer solution 3 mL by Nebulization route every four (4) hours. Indications: CHRONIC OBSTRUCTIVE PULMONARY DISEASE WITH BRONCHOSPASMS    inhalational spacing device 1 Each by Does Not Apply route as needed.  apixaban (ELIQUIS) 5 mg tablet Take 1 Tab by mouth every twelve (12) hours for 30 days. Indications: PULMONARY THROMBOEMBOLISM PREVENTION    gabapentin (NEURONTIN) 300 mg capsule Take 600 mg by mouth nightly.  DULoxetine (CYMBALTA) 30 mg capsule Take 3 Caps by mouth daily. bedtime (Patient taking differently: Take 90 mg by mouth nightly. 3/28/17: per pt: has not started taking yet--pending direction from prescriber on how to transition from Venlafaxine XR (current med) to Duloxetine)    ergocalciferol (ERGOCALCIFEROL) 50,000 unit capsule Take 1 Cap by mouth every seven (7) days. No current facility-administered medications for this visit. Past Medical History:   Diagnosis Date    Anemia 3/15/2013    Arrhythmia     paroxsimal afib    Asthma 3/15/2013    Asthma     Back disorder     disc problem    Chronic obstructive pulmonary disease (HCC)     Morbid obesity ([facility] 3/15/2013         ROS:  Denies fever, chills, cough, chest pain, SOB,  nausea, vomiting, or diarrhea. Denies wt loss, wt gain, hemoptysis, hematochezia or melena.     Physical Examination:    /74 (BP 1 Location: Right arm, BP Patient Position: Sitting)  Pulse 82  Resp 16  Wt 254 lb (115.2 kg)  SpO2 95%  BMI 41 kg/m2    General: Alert and Ox3, Fluent speech  HEENT:  NC/AT, EOMI, OP: clear  Neck:  Supple, no adenopathy, JVD, mass or bruit  Chest:  Clear to Ausculation, without wheezes, rales, rubs or ronchi  Cardiac: RRR  Abdomen:  +BS, soft, nontender without palpable HSM  Extremities:  No cyanosis, clubbing or edema  Neurologic:  Ambulatory without assist, CN 2-12 grossly intact. Moves all extremities. Skin: no rash  Lymphadenopathy: no cervical or supraclavicular nodes      ASSESSMENT AND PLAN:     1. Acute PE: I have given her samples of Apixaban 5 mg BID. She will be applying for care [facility] at the [facility]. She may qualify for the Care Card. I have scheduled her for follow up in 4 weeks and we will sork with her to obtain samples  2. LBP:  Discussion today regarding Hydrocodone. Urged her to wean ASAP and to use this only if needed and hopefully no more than 1-2 times per week  3. RAD:  Stable at this time. No orders of the defined types were placed in this encounter.       Jimbo Jhaveri MD, [address]

## 2017-04-02 VITALS
SYSTOLIC BLOOD PRESSURE: 146 MMHG | WEIGHT: 250 LBS | OXYGEN SATURATION: 95 % | HEIGHT: 66 IN | TEMPERATURE: 97.4 F | BODY MASS INDEX: 40.18 KG/M2 | RESPIRATION RATE: 20 BRPM | DIASTOLIC BLOOD PRESSURE: 80 MMHG | HEART RATE: 88 BPM

## 2017-04-03 ENCOUNTER — HOME CARE VISIT (OUTPATIENT)
Dept: SCHEDULING | Facility: HOME HEALTH | Age: 57
End: 2017-04-03
Payer: COMMERCIAL

## 2017-04-03 VITALS
DIASTOLIC BLOOD PRESSURE: 74 MMHG | TEMPERATURE: 98.7 F | OXYGEN SATURATION: 97 % | RESPIRATION RATE: 20 BRPM | HEART RATE: 81 BPM | SYSTOLIC BLOOD PRESSURE: 124 MMHG

## 2017-04-03 VITALS — RESPIRATION RATE: 17 BRPM

## 2017-04-03 PROCEDURE — G0299 HHS/HOSPICE OF RN EA 15 MIN: HCPCS

## 2017-04-04 ENCOUNTER — HOME CARE VISIT (OUTPATIENT)
Dept: HOME HEALTH SERVICES | Facility: HOME HEALTH | Age: 57
End: 2017-04-04
Payer: COMMERCIAL

## 2017-04-04 ENCOUNTER — HOME CARE VISIT (OUTPATIENT)
Dept: SCHEDULING | Facility: HOME HEALTH | Age: 57
End: 2017-04-04
Payer: COMMERCIAL

## 2017-04-04 PROCEDURE — G0151 HHCP-SERV OF PT,EA 15 MIN: HCPCS

## 2017-04-06 ENCOUNTER — HOME CARE VISIT (OUTPATIENT)
Dept: HOME HEALTH SERVICES | Facility: HOME HEALTH | Age: 57
End: 2017-04-06
Payer: COMMERCIAL

## 2017-04-07 ENCOUNTER — HOME CARE VISIT (OUTPATIENT)
Dept: HOME HEALTH SERVICES | Facility: HOME HEALTH | Age: 57
End: 2017-04-07
Payer: COMMERCIAL

## 2017-04-07 ENCOUNTER — HOME CARE VISIT (OUTPATIENT)
Dept: SCHEDULING | Facility: HOME HEALTH | Age: 57
End: 2017-04-07
Payer: COMMERCIAL

## 2017-04-07 VITALS
TEMPERATURE: 98.2 F | DIASTOLIC BLOOD PRESSURE: 84 MMHG | RESPIRATION RATE: 20 BRPM | SYSTOLIC BLOOD PRESSURE: 122 MMHG | OXYGEN SATURATION: 97 % | HEART RATE: 88 BPM

## 2017-04-07 PROCEDURE — G0299 HHS/HOSPICE OF RN EA 15 MIN: HCPCS

## 2017-04-10 ENCOUNTER — HOME CARE VISIT (OUTPATIENT)
Dept: SCHEDULING | Facility: HOME HEALTH | Age: 57
End: 2017-04-10
Payer: COMMERCIAL

## 2017-04-10 VITALS
OXYGEN SATURATION: 97 % | SYSTOLIC BLOOD PRESSURE: 124 MMHG | HEART RATE: 96 BPM | RESPIRATION RATE: 20 BRPM | DIASTOLIC BLOOD PRESSURE: 80 MMHG | TEMPERATURE: 98.1 F

## 2017-04-10 PROCEDURE — G0299 HHS/HOSPICE OF RN EA 15 MIN: HCPCS

## 2017-04-12 ENCOUNTER — HOME CARE VISIT (OUTPATIENT)
Dept: HOME HEALTH SERVICES | Facility: HOME HEALTH | Age: 57
End: 2017-04-12
Payer: COMMERCIAL

## 2017-04-13 ENCOUNTER — HOME CARE VISIT (OUTPATIENT)
Dept: SCHEDULING | Facility: HOME HEALTH | Age: 57
End: 2017-04-13
Payer: COMMERCIAL

## 2017-04-13 PROCEDURE — G0151 HHCP-SERV OF PT,EA 15 MIN: HCPCS

## 2017-04-13 PROCEDURE — G0299 HHS/HOSPICE OF RN EA 15 MIN: HCPCS

## 2017-04-14 VITALS
RESPIRATION RATE: 20 BRPM | HEART RATE: 80 BPM | TEMPERATURE: 96.8 F | OXYGEN SATURATION: 98 % | DIASTOLIC BLOOD PRESSURE: 82 MMHG | SYSTOLIC BLOOD PRESSURE: 126 MMHG

## 2017-04-28 ENCOUNTER — OFFICE VISIT (OUTPATIENT)
Dept: FAMILY MEDICINE CLINIC | Age: 57
End: 2017-04-28

## 2017-04-28 VITALS
WEIGHT: 256.8 LBS | RESPIRATION RATE: 21 BRPM | SYSTOLIC BLOOD PRESSURE: 118 MMHG | BODY MASS INDEX: 41.45 KG/M2 | HEART RATE: 82 BPM | DIASTOLIC BLOOD PRESSURE: 82 MMHG | OXYGEN SATURATION: 98 %

## 2017-04-28 DIAGNOSIS — M53.9 BACK DISORDER: ICD-10-CM

## 2017-04-28 DIAGNOSIS — I26.09 OTHER ACUTE PULMONARY EMBOLISM WITH ACUTE COR PULMONALE (HCC): Primary | ICD-10-CM

## 2017-04-28 NOTE — PROGRESS NOTES
Chief Complaint   Patient presents with    LOW BACK PAIN         HPI:       is a 61 y.o. female who until July 2016 worked for [employer] in [facility]. Developed LBP with RLE sciatica. PT and NATALIIA. Has not used a TENS unit. Well until presenting suddenly with hypoxia and SOB to [facility] ER early March 2017 with acute PE diagnosed by Dr Sandra Ashton. Place on Lovenox as bridge to Warfarin; Suffered acute hemorrhage to the RLE requiring rehospitalization at ED [facility]; Discharged home 3/28 on Apixaban 5 mg BID. New Issues:  No more bleeding. Doing well with the Apixaban. Still limited by pain in her back and legs. Allergies   Allergen Reactions    Pcn [Penicillins] Rash    Griseofulvin Other (comments)     Aaron-dirk syndrome    Pcn [Penicillins] Rash and Swelling    Tramadol Nausea Only and Drowsiness     Interaction with Effexor       Current Outpatient Prescriptions   Medication Sig    venlafaxine-SR (EFFEXOR XR) 150 mg capsule Take 150 mg by mouth daily.  albuterol (PROVENTIL VENTOLIN) 2.5 mg /3 mL (0.083 %) nebulizer solution by Nebulization route once.  esomeprazole (NEXIUM) 40 mg capsule Take 1 Cap by mouth daily.  mometasone-formoterol (DULERA) 200-5 mcg/actuation HFA inhaler Take 2 Puffs by inhalation two (2) times a day.  albuterol (PROAIR HFA) 90 mcg/actuation inhaler Take 2 Puffs by inhalation every four (4) hours as needed for Wheezing.  ALPRAZolam (XANAX) 0.5 mg tablet Take 1 Tab by mouth nightly as needed for Anxiety. Max Daily Amount: 0.5 mg.    zolpidem (AMBIEN) 5 mg tablet Take 1 Tab by mouth nightly as needed for Sleep. Max Daily Amount: 5 mg.  albuterol-ipratropium (DUO-NEB) 2.5 mg-0.5 mg/3 ml nebulizer solution 3 mL by Nebulization route every four (4) hours. Indications: CHRONIC OBSTRUCTIVE PULMONARY DISEASE WITH BRONCHOSPASMS    inhalational spacing device 1 Each by Does Not Apply route as needed.     apixaban (ELIQUIS) 5 mg tablet Take 1 Tab by mouth every twelve (12) hours for 30 days. Indications: PULMONARY THROMBOEMBOLISM PREVENTION    calcium carbonate (TUMS) 200 mg calcium (500 mg) chew Take 3 Tabs by mouth daily.  HYDROcodone-acetaminophen (NORCO) 5-325 mg per tablet Take 1 Tab by mouth every six (6) hours as needed for Pain. Max Daily Amount: 4 Tabs.  gabapentin (NEURONTIN) 300 mg capsule Take 300 mg by mouth two (2) times a day.  gabapentin (NEURONTIN) 300 mg capsule Take 600 mg by mouth nightly.  vitamin A (AQUASOL A) 10,000 unit capsule Take 10,000 Units by mouth daily.  cyanocobalamin (VITAMIN B-12) 1,000 mcg tablet Take 1,000 mcg by mouth daily.  ferrous sulfate (SLOW FE) 142 mg (45 mg iron) ER tablet Take 45 mg by mouth Daily (before breakfast).  DULoxetine (CYMBALTA) 30 mg capsule Take 3 Caps by mouth daily. bedtime (Patient taking differently: Take 90 mg by mouth nightly. 3/28/17: per pt: has not started taking yet--pending direction from prescriber on how to transition from Venlafaxine XR (current med) to Duloxetine)    ergocalciferol (ERGOCALCIFEROL) 50,000 unit capsule Take 1 Cap by mouth every seven (7) days.  nortriptyline (PAMELOR) 25 mg capsule take 1 to 2 tablets by mouth at bedtime if needed    sucralfate (CARAFATE) 100 mg/mL suspension Take 5 mL by mouth four (4) times daily.  cyclobenzaprine (FLEXERIL) 10 mg tablet Take 1 Tab by mouth three (3) times daily as needed for Muscle Spasm(s). No current facility-administered medications for this visit. Past Medical History:   Diagnosis Date    Anemia 3/15/2013    Arrhythmia     paroxsimal afib    Asthma 3/15/2013    Asthma     Back disorder     disc problem    Chronic obstructive pulmonary disease (HCC)     Morbid obesity ([address] 3/15/2013         ROS:  Denies fever, chills, cough, chest pain, SOB,  nausea, vomiting, or diarrhea. Denies wt loss, wt gain, hemoptysis, hematochezia or melena.     Physical Examination:    /82 (BP 1 Location: Right arm, BP Patient Position: Sitting)  Pulse 82  Resp 21  Wt 256 lb 12.8 oz (116.5 kg)  SpO2 98%  BMI 41.45 kg/m2    General: Alert and Ox3, Fluent speech  HEENT:  NC/AT, EOMI, OP: clear  Neck:  Supple, no adenopathy, JVD, mass or bruit  Chest:  Clear to Ausculation, without wheezes, rales, rubs or ronchi  Cardiac: RRR  Abdomen:  +BS, soft, nontender without palpable HSM  Extremities:  No cyanosis, clubbing or edema  Neurologic:  Ambulatory without assist, CN 2-12 grossly intact. Moves all extremities. Skin: no rash  Lymphadenopathy: no cervical or supraclavicular nodes      ASSESSMENT AND PLAN:     1. PE:  Samples provided. RTC in 4 weeks  2. Pain:  Increase Gabapentin with a goal of 600 mg TID over the next 3-4 weeks. 3.  RTC in 4 weeks. No orders of the defined types were placed in this encounter.       Aren Brown MD, Abelino Dowell

## 2017-04-28 NOTE — MR AVS SNAPSHOT
Visit Information Date & Time Provider Department Dept. Phone Encounter #  
 4/28/2017 11:30 AM Antonio Cardoza MD [address] [place] [id number] Upcoming Health Maintenance Date Due Hepatitis C Screening 1960 COLONOSCOPY 12/4/1977 DTaP/Tdap/Td series (1 - Tdap) 12/4/1980 PAP AKA CERVICAL CYTOLOGY 12/4/1980 BREAST CANCER SCRN MAMMOGRAM 9/22/2017 Allergies as of 4/28/2017  Review Complete On: 4/28/2017 By: Yisel Hernandez Severity Noted Reaction Type Reactions Pcn [Penicillins] High 11/14/2014    Rash Griseofulvin  11/14/2014    Other (comments) Aaron-dirk syndrome Pcn [Penicillins]  03/15/2013    Rash, Swelling Tramadol  08/19/2015    Nausea Only, Drowsiness Interaction with Effexor Current Immunizations  Reviewed on 4/28/2017 Name Date Influenza High Dose Vaccine PF 9/28/2016, 9/24/2015 Pneumococcal Conjugate (PCV-13) 8/26/2015 Reviewed by Yisel Hernandez on 4/28/2017 at 11:48 AM  
Vitals BP Pulse Resp Weight(growth percentile) SpO2 BMI  
 118/82 (BP 1 Location: Right arm, BP Patient Position: Sitting) 82 21 256 lb 12.8 oz (116.5 kg) 98% 41.45 kg/m2 OB Status Smoking Status Menopause Never Smoker Vitals History BMI and BSA Data Body Mass Index Body Surface Area  
 41.45 kg/m 2 2.33 m 2 Preferred Pharmacy Pharmacy Name Phone  [address] [phone] Your Updated Medication List  
  
   
This list is accurate as of: 4/28/17 12:45 PM.  Always use your most recent med list.  
  
  
  
  
 * albuterol 2.5 mg /3 mL (0.083 %) nebulizer solution Commonly known as:  PROVENTIL VENTOLIN  
by Nebulization route once. * albuterol 90 mcg/actuation inhaler Commonly known as:  PROAIR HFA Take 2 Puffs by inhalation every four (4) hours as needed for Wheezing. albuterol-ipratropium 2.5 mg-0.5 mg/3 ml Nebu Commonly known as:  DUO-NEB  
3 mL by Nebulization route every four (4) hours. Indications: CHRONIC OBSTRUCTIVE PULMONARY DISEASE WITH BRONCHOSPASMS ALPRAZolam 0.5 mg tablet Commonly known as:  Mirian Webber Take 1 Tab by mouth nightly as needed for Anxiety. Max Daily Amount: 0.5 mg.  
  
 apixaban 5 mg tablet Commonly known as:  Galina Oneil Take 1 Tab by mouth every twelve (12) hours for 30 days. Indications: PULMONARY THROMBOEMBOLISM PREVENTION  
  
 calcium carbonate 200 mg calcium (500 mg) Chew Commonly known as:  TUMS Take 3 Tabs by mouth daily. cyclobenzaprine 10 mg tablet Commonly known as:  FLEXERIL Take 1 Tab by mouth three (3) times daily as needed for Muscle Spasm(s). DULoxetine 30 mg capsule Commonly known as:  CYMBALTA Take 3 Caps by mouth daily. bedtime EFFEXOR  mg capsule Generic drug:  venlafaxine-SR Take 150 mg by mouth daily. ergocalciferol 50,000 unit capsule Commonly known as:  ERGOCALCIFEROL Take 1 Cap by mouth every seven (7) days. esomeprazole 40 mg capsule Commonly known as:  Slovan [place] Take 1 Cap by mouth daily. ferrous sulfate 142 mg (45 mg iron) ER tablet Commonly known as:  SLOW FE Take 45 mg by mouth Daily (before breakfast). * gabapentin 300 mg capsule Commonly known as:  NEURONTIN Take 300 mg by mouth two (2) times a day. * gabapentin 300 mg capsule Commonly known as:  NEURONTIN Take 600 mg by mouth nightly. HYDROcodone-acetaminophen 5-325 mg per tablet Commonly known as:  Anu Holiday Take 1 Tab by mouth every six (6) hours as needed for Pain. Max Daily Amount: 4 Tabs. inhalational spacing device 1 Each by Does Not Apply route as needed. mometasone-formoterol 200-5 mcg/actuation HFA inhaler Commonly known as:  Tenna Ambar Take 2 Puffs by inhalation two (2) times a day. nortriptyline 25 mg capsule Commonly known as:  PAMELOR  
 take 1 to 2 tablets by mouth at bedtime if needed  
  
 sucralfate 100 mg/mL suspension Commonly known as:  Starlene Efren Take 5 mL by mouth four (4) times daily. vitamin A 10,000 unit capsule Commonly known as:  AQUASOL A Take 10,000 Units by mouth daily. VITAMIN B-12 1,000 mcg tablet Generic drug:  cyanocobalamin Take 1,000 mcg by mouth daily. zolpidem 5 mg tablet Commonly known as:  AMBIEN Take 1 Tab by mouth nightly as needed for Sleep. Max Daily Amount: 5 mg.  
  
 * Notice: This list has 4 medication(s) that are the same as other medications prescribed for you. Read the directions carefully, and ask your doctor or other care provider to review them with you. Introducing [facility] & HEALTH SERVICES! Dear Kerry Gurrola: Thank you for requesting a edo account. Our records indicate that you already have an active edo account. You can access your account anytime at [email or web address] Did you know that you can access your hospital and ER discharge instructions at any time in edo? You can also review all of your test results from your hospital stay or ER visit. Additional Information If you have questions, please visit the Frequently Asked Questions section of the edo website at [email or web address] Remember, edo is NOT to be used for urgent needs. For medical emergencies, dial 911. Now available from your iPhone and Android! Please provide this summary of care documentation to your next provider. Your primary care clinician is listed as Christiane Esparza. If you have any questions after today's visit, please call [phone].

## 2017-05-30 ENCOUNTER — OFFICE VISIT (OUTPATIENT)
Dept: FAMILY MEDICINE CLINIC | Age: 57
End: 2017-05-30

## 2017-05-30 VITALS
DIASTOLIC BLOOD PRESSURE: 94 MMHG | RESPIRATION RATE: 19 BRPM | WEIGHT: 252.8 LBS | SYSTOLIC BLOOD PRESSURE: 136 MMHG | OXYGEN SATURATION: 98 % | BODY MASS INDEX: 40.8 KG/M2 | HEART RATE: 97 BPM

## 2017-05-30 DIAGNOSIS — G25.0 ESSENTIAL TREMOR: ICD-10-CM

## 2017-05-30 DIAGNOSIS — I26.09 OTHER ACUTE PULMONARY EMBOLISM WITH ACUTE COR PULMONALE (HCC): ICD-10-CM

## 2017-05-30 DIAGNOSIS — M53.9 BACK DISORDER: Primary | ICD-10-CM

## 2017-05-30 RX ORDER — PROPRANOLOL HYDROCHLORIDE 20 MG/1
20 TABLET ORAL 3 TIMES DAILY
Qty: 90 TAB | Refills: 1 | Status: SHIPPED | OUTPATIENT
Start: 2017-05-30 | End: 2017-06-27

## 2017-05-30 RX ORDER — METHYLPREDNISOLONE 4 MG/1
TABLET ORAL
Qty: 1 DOSE PACK | Refills: 0 | Status: SHIPPED | OUTPATIENT
Start: 2017-05-30 | End: 2017-06-27

## 2017-05-30 RX ORDER — DIPHENHYDRAMINE HCL 25 MG
25 CAPSULE ORAL
COMMUNITY

## 2017-05-30 NOTE — PROGRESS NOTES
Chief Complaint   Patient presents with    Back Pain     follow up appointment.  Shaking      constant worse in right hand but in both hands. HPI:       is a 61 y.o. female who until July 2016 worked for [employer] in [place]. Developed LBP with RLE sciatica. PT and NATALIIA. Has not used a TENS unit. Well until presenting suddenly with hypoxia and SOB to [facility] ER early March 2017 with acute PE diagnosed by Dr Abdullahi Patel. Place on Lovenox as bridge to Warfarin; Suffered acute hemorrhage to the RLE requiring rehospitalization at [facility]; Discharged home 3/28 on Apixaban 5 mg BID. New Issues:  Depressed and recently started on Venlafaxine. Still unemployed and unable to work due to her LBP. Right sided paraspinal spasm. Allergies   Allergen Reactions    Pcn [Penicillins] Rash    Gabapentin Other (comments)     Hallucinations/ sleep walking    Griseofulvin Other (comments)     Aaron-dirk syndrome    Pcn [Penicillins] Rash and Swelling    Tramadol Nausea Only and Drowsiness     Interaction with Effexor       Current Outpatient Prescriptions   Medication Sig    diphenhydrAMINE (BENADRYL) 25 mg capsule Take 25 mg by mouth every six (6) hours as needed.  methylPREDNISolone (MEDROL DOSEPACK) 4 mg tablet As Directed    propranolol (INDERAL) 20 mg tablet Take 1 Tab by mouth three (3) times daily.  calcium carbonate (TUMS) 200 mg calcium (500 mg) chew Take 3 Tabs by mouth daily.  vitamin A (AQUASOL A) 10,000 unit capsule Take 10,000 Units by mouth daily.  cyanocobalamin (VITAMIN B-12) 1,000 mcg tablet Take 1,000 mcg by mouth daily.  ferrous sulfate (SLOW FE) 142 mg (45 mg iron) ER tablet Take 45 mg by mouth Daily (before breakfast).  ergocalciferol (ERGOCALCIFEROL) 50,000 unit capsule Take 1 Cap by mouth every seven (7) days.  esomeprazole (NEXIUM) 40 mg capsule Take 1 Cap by mouth daily.     nortriptyline (PAMELOR) 25 mg capsule take 1 to 2 tablets by mouth at bedtime if needed    inhalational spacing device 1 Each by Does Not Apply route as needed.  venlafaxine-SR (EFFEXOR XR) 150 mg capsule Take 150 mg by mouth daily.  albuterol (PROVENTIL VENTOLIN) 2.5 mg /3 mL (0.083 %) nebulizer solution by Nebulization route once.  mometasone-formoterol (DULERA) 200-5 mcg/actuation HFA inhaler Take 2 Puffs by inhalation two (2) times a day.  albuterol (PROAIR HFA) 90 mcg/actuation inhaler Take 2 Puffs by inhalation every four (4) hours as needed for Wheezing.  ALPRAZolam (XANAX) 0.5 mg tablet Take 1 Tab by mouth nightly as needed for Anxiety. Max Daily Amount: 0.5 mg.    zolpidem (AMBIEN) 5 mg tablet Take 1 Tab by mouth nightly as needed for Sleep. Max Daily Amount: 5 mg.  sucralfate (CARAFATE) 100 mg/mL suspension Take 5 mL by mouth four (4) times daily.  cyclobenzaprine (FLEXERIL) 10 mg tablet Take 1 Tab by mouth three (3) times daily as needed for Muscle Spasm(s).  albuterol-ipratropium (DUO-NEB) 2.5 mg-0.5 mg/3 ml nebulizer solution 3 mL by Nebulization route every four (4) hours. Indications: CHRONIC OBSTRUCTIVE PULMONARY DISEASE WITH BRONCHOSPASMS     No current facility-administered medications for this visit. Past Medical History:   Diagnosis Date    Anemia 3/15/2013    Arrhythmia     paroxsimal afib    Asthma 3/15/2013    Asthma     Back disorder     disc problem    Chronic obstructive pulmonary disease (HCC)     Morbid [address] [address] 3/15/2013         ROS:  Denies fever, chills, cough, chest pain, SOB,  nausea, vomiting, or diarrhea. Denies wt loss, wt gain, hemoptysis, hematochezia or melena.     Physical Examination:    BP (!) 136/94 (BP 1 Location: Right arm, BP Patient Position: Sitting)  Pulse 97  Resp 19  Wt 252 lb 12.8 oz (114.7 kg)  SpO2 98%  BMI 40.8 kg/m2    General: Alert and Ox3, Fluent speech  HEENT:  NC/AT, EOMI, OP: clear  Neck:  Supple, no adenopathy, JVD, mass or bruit  Chest:  Clear to Ausculation, without wheezes, rales, rubs or ronchi  Cardiac: RRR  Abdomen:  +BS, soft, nontender without palpable HSM  Extremities:  No cyanosis, clubbing or edema  Neurologic:  Ambulatory without assist, CN 2-12 grossly intact. Moves all extremities. Skin: no rash  Lymphadenopathy: no cervical or supraclavicular nodes      ASSESSMENT AND PLAN:     1. Severe musculoskeletal LBP with sciatica and spasm:  Would benefit from Deep tissue massage, TENS unit; unable to take NSAIDs. Opiates are not a good choice. Recommending a MDP. Needs PT and she will RTC in a month  2. PE:  Continue Eliquis    Orders Placed This Encounter    diphenhydrAMINE (BENADRYL) 25 mg capsule     Sig: Take 25 mg by mouth every six (6) hours as needed.  methylPREDNISolone (MEDROL DOSEPACK) 4 mg tablet     Sig: As Directed     Dispense:  1 Dose Pack     Refill:  0    propranolol (INDERAL) 20 mg tablet     Sig: Take 1 Tab by mouth three (3) times daily.      Dispense:  90 Tab     Refill:  1       Melissa Xiao MD, Raegan Locke

## 2017-05-30 NOTE — MR AVS SNAPSHOT
Visit Information Date & Time Provider Department Dept. Phone Encounter #  
 5/30/2017  3:30 PM Keerthi Prieto, Loni Melendez [id number] Upcoming Health Maintenance Date Due Hepatitis C Screening 1960 COLONOSCOPY 12/4/1977 DTaP/Tdap/Td series (1 - Tdap) 12/4/1980 PAP AKA CERVICAL CYTOLOGY 12/4/1980 BREAST CANCER SCRN MAMMOGRAM 9/22/2017 INFLUENZA AGE 9 TO ADULT 8/1/2017 Allergies as of 5/30/2017  Review Complete On: 5/30/2017 By: Ravi Ashley Severity Noted Reaction Type Reactions Pcn [Penicillins] High 11/14/2014    Rash  
 Gabapentin  05/30/2017    Other (comments) Hallucinations/ sleep walking Griseofulvin  11/14/2014    Other (comments) Aaron-dirk syndrome Pcn [Penicillins]  03/15/2013    Rash, Swelling Tramadol  08/19/2015    Nausea Only, Drowsiness Interaction with Effexor Current Immunizations  Reviewed on 4/28/2017 Name Date Influenza High Dose Vaccine PF 9/28/2016, 9/24/2015 Pneumococcal Conjugate (PCV-13) 8/26/2015 Not reviewed this visit You Were Diagnosed With   
  
 Codes Comments Back disorder    -  Primary ICD-10-CM: M53.9 ICD-9-CM: 724.9 Other acute pulmonary embolism with acute cor pulmonale (HCC)     ICD-10-CM: I26.09 
ICD-9-CM: 415.19, 415.0 Essential tremor     ICD-10-CM: G25.0 ICD-9-CM: 333.1 Vitals BP Pulse Resp Weight(growth percentile) SpO2 BMI  
 (!) 136/94 (BP 1 Location: Right arm, BP Patient Position: Sitting) 97 19 252 lb 12.8 oz (114.7 kg) 98% 40.8 kg/m2 OB Status Smoking Status Menopause Never Smoker Vitals History BMI and BSA Data Body Mass Index Body Surface Area  
 40.8 kg/m 2 2.31 m 2 Preferred Pharmacy Pharmacy Name Phone  [address] [phone] Your Updated Medication List  
  
   
 This list is accurate as of: 5/30/17  3:48 PM.  Always use your most recent med list.  
  
  
  
  
 * albuterol 2.5 mg /3 mL (0.083 %) nebulizer solution Commonly known as:  PROVENTIL VENTOLIN  
by Nebulization route once. * albuterol 90 mcg/actuation inhaler Commonly known as:  PROAIR HFA Take 2 Puffs by inhalation every four (4) hours as needed for Wheezing. albuterol-ipratropium 2.5 mg-0.5 mg/3 ml Nebu Commonly known as:  DUO-NEB  
3 mL by Nebulization route every four (4) hours. Indications: CHRONIC OBSTRUCTIVE PULMONARY DISEASE WITH BRONCHOSPASMS ALPRAZolam 0.5 mg tablet Commonly known as:  Aleena Cumins Take 1 Tab by mouth nightly as needed for Anxiety. Max Daily Amount: 0.5 mg. BENADRYL 25 mg capsule Generic drug:  diphenhydrAMINE Take 25 mg by mouth every six (6) hours as needed. calcium carbonate 200 mg calcium (500 mg) Chew Commonly known as:  TUMS Take 3 Tabs by mouth daily. cyclobenzaprine 10 mg tablet Commonly known as:  FLEXERIL Take 1 Tab by mouth three (3) times daily as needed for Muscle Spasm(s). EFFEXOR  mg capsule Generic drug:  venlafaxine-SR Take 150 mg by mouth daily. ergocalciferol 50,000 unit capsule Commonly known as:  ERGOCALCIFEROL Take 1 Cap by mouth every seven (7) days. esomeprazole 40 mg capsule Commonly known as:  Maritza Bride Take 1 Cap by mouth daily. ferrous sulfate 142 mg (45 mg iron) ER tablet Commonly known as:  SLOW FE Take 45 mg by mouth Daily (before breakfast). inhalational spacing device 1 Each by Does Not Apply route as needed. mometasone-formoterol 200-5 mcg/actuation HFA inhaler Commonly known as:  Jill Miu Take 2 Puffs by inhalation two (2) times a day. nortriptyline 25 mg capsule Commonly known as:  PAMELOR  
take 1 to 2 tablets by mouth at bedtime if needed  
  
 sucralfate 100 mg/mL suspension Commonly known as:  Stephanie Rivera  
 Take 5 mL by mouth four (4) times daily. vitamin A 10,000 unit capsule Commonly known as:  AQUASOL A Take 10,000 Units by mouth daily. VITAMIN B-12 1,000 mcg tablet Generic drug:  cyanocobalamin Take 1,000 mcg by mouth daily. zolpidem 5 mg tablet Commonly known as:  AMBIEN Take 1 Tab by mouth nightly as needed for Sleep. Max Daily Amount: 5 mg.  
  
 * Notice: This list has 2 medication(s) that are the same as other medications prescribed for you. Read the directions carefully, and ask your doctor or other care provider to review them with you. Introducing [facility] & HEALTH SERVICES! Dear Rena Spotted: Thank you for requesting a [email or web address] account. Our records indicate that you already have an active [email or web address] account. You can access your account anytime at [email or web address] Did you know that you can access your hospital and ER discharge instructions at any time in [email or web address]? You can also review all of your test results from your hospital stay or ER visit. Additional Information If you have questions, please visit the Frequently Asked Questions section of the [email or web address] website at [email or web address] Remember, [email or web address] is NOT to be used for urgent needs. For medical emergencies, dial 911. Now available from your iPhone and Android! Please provide this summary of care documentation to your next provider. Your primary care clinician is listed as Kaylene Bethea. If you have any questions after today's visit, please call [phone].

## 2017-06-27 ENCOUNTER — OFFICE VISIT (OUTPATIENT)
Dept: FAMILY MEDICINE CLINIC | Age: 57
End: 2017-06-27

## 2017-06-27 VITALS
WEIGHT: 251 LBS | HEART RATE: 87 BPM | RESPIRATION RATE: 16 BRPM | SYSTOLIC BLOOD PRESSURE: 119 MMHG | BODY MASS INDEX: 40.34 KG/M2 | TEMPERATURE: 97.1 F | DIASTOLIC BLOOD PRESSURE: 52 MMHG | HEIGHT: 66 IN | OXYGEN SATURATION: 94 %

## 2017-06-27 DIAGNOSIS — F41.9 ANXIETY: ICD-10-CM

## 2017-06-27 DIAGNOSIS — I26.99 PULMONARY EMBOLISM, OTHER: Primary | ICD-10-CM

## 2017-06-27 RX ORDER — ALPRAZOLAM 0.5 MG/1
0.5 TABLET ORAL
Qty: 30 TAB | Refills: 2 | Status: SHIPPED | OUTPATIENT
Start: 2017-06-27 | End: 2017-11-09 | Stop reason: SDUPTHER

## 2017-06-27 NOTE — PATIENT INSTRUCTIONS
If you have any questions regarding [place], you may call [employer] support at [phone].

## 2017-06-27 NOTE — MR AVS SNAPSHOT
Visit Information Date & Time Provider Department Dept. Phone Encounter #  
 6/27/2017  2:30 PM Antonio Cardoza MD [address] [place] [address] [id number] Your Appointments 8/4/2017  2:00 PM  
ESTABLISHED PATIENT with Antonio Cardoza MD  
[place] [address] ([address]) Appt Note: 1 mo f/u  
 [address] [place] [id number] [place] [facility],[address] [address] [phone]  
  
   
 [address] [facility],[address] [address] Upcoming Health Maintenance Date Due Hepatitis C Screening 1960 COLONOSCOPY 12/4/1977 DTaP/Tdap/Td series (1 - Tdap) 12/4/1980 PAP AKA CERVICAL CYTOLOGY 12/4/1980 BREAST CANCER SCRN MAMMOGRAM 9/22/2017 INFLUENZA AGE 9 TO ADULT 8/1/2017 Allergies as of 6/27/2017  Review Complete On: 6/27/2017 By: Von Marie LPN Severity Noted Reaction Type Reactions Pcn [Penicillins] High 11/14/2014    Rash  
 Gabapentin  05/30/2017    Other (comments) Hallucinations/ sleep walking Griseofulvin  11/14/2014    Other (comments) Aaron-dirk syndrome Pcn [Penicillins]  03/15/2013    Rash, Swelling Tramadol  08/19/2015    Nausea Only, Drowsiness Interaction with Effexor Current Immunizations  Reviewed on 4/28/2017 Name Date Influenza High Dose Vaccine PF 9/28/2016, 9/24/2015 Pneumococcal Conjugate (PCV-13) 8/26/2015 Not reviewed this visit You Were Diagnosed With   
  
 Codes Comments Anxiety     ICD-10-CM: F41.9 ICD-9-CM: 300.00 Vitals BP Pulse Temp Resp Height(growth percentile) Weight(growth percentile) 119/52 87 97.1 °F (36.2 °C) (Oral) 16 5' 6\" (1.676 m) 251 lb (113.9 kg) SpO2 BMI OB Status Smoking Status 94% 40.51 kg/m2 Menopause Never Smoker BMI and BSA Data Body Mass Index Body Surface Area 40.51 kg/m 2 2.3 m 2 Preferred Pharmacy Pharmacy Name Phone  [facility] [address] [place] [address] [address] [phone] Your Updated Medication List  
  
   
This list is accurate as of: 6/27/17  3:10 PM.  Always use your most recent med list.  
  
  
  
  
 * albuterol 2.5 mg /3 mL (0.083 %) nebulizer solution Commonly known as:  PROVENTIL VENTOLIN  
by Nebulization route once. * albuterol 90 mcg/actuation inhaler Commonly known as:  PROAIR HFA Take 2 Puffs by inhalation every four (4) hours as needed for Wheezing. albuterol-ipratropium 2.5 mg-0.5 mg/3 ml Nebu Commonly known as:  DUO-NEB  
3 mL by Nebulization route every four (4) hours. Indications: CHRONIC OBSTRUCTIVE PULMONARY DISEASE WITH BRONCHOSPASMS ALPRAZolam 0.5 mg tablet Commonly known as:  Tamra Dials Take 1 Tab by mouth nightly as needed for Anxiety. Max Daily Amount: 0.5 mg.  
  
 apixaban 5 mg tablet Commonly known as:  Cristina Dage Take 1 Tab by mouth two (2) times a day. BENADRYL 25 mg capsule Generic drug:  diphenhydrAMINE Take 25 mg by mouth every six (6) hours as needed. calcium carbonate 200 mg calcium (500 mg) Chew Commonly known as:  TUMS Take 3 Tabs by mouth daily. cyclobenzaprine 10 mg tablet Commonly known as:  FLEXERIL Take 1 Tab by mouth three (3) times daily as needed for Muscle Spasm(s). EFFEXOR  mg capsule Generic drug:  venlafaxine-SR Take 150 mg by mouth daily. ergocalciferol 50,000 unit capsule Commonly known as:  ERGOCALCIFEROL Take 1 Cap by mouth every seven (7) days. esomeprazole 40 mg capsule Commonly known as:  Chris Pacini Take 1 Cap by mouth daily. ferrous sulfate 142 mg (45 mg iron) ER tablet Commonly known as:  SLOW FE Take 45 mg by mouth Daily (before breakfast). inhalational spacing device 1 Each by Does Not Apply route as needed. mometasone-formoterol 200-5 mcg/actuation HFA inhaler Commonly known as:  Marijean Balls Take 2 Puffs by inhalation two (2) times a day. nortriptyline 25 mg capsule Commonly known as:  PAMELOR  
take 1 to 2 tablets by mouth at bedtime if needed  
  
 sucralfate 100 mg/mL suspension Commonly known as:  Hannon Opal Take 5 mL by mouth four (4) times daily. vitamin A 10,000 unit capsule Commonly known as:  AQUASOL A Take 10,000 Units by mouth daily. VITAMIN B-12 1,000 mcg tablet Generic drug:  cyanocobalamin Take 1,000 mcg by mouth daily. zolpidem 5 mg tablet Commonly known as:  AMBIEN Take 1 Tab by mouth nightly as needed for Sleep. Max Daily Amount: 5 mg.  
  
 * Notice: This list has 2 medication(s) that are the same as other medications prescribed for you. Read the directions carefully, and ask your doctor or other care provider to review them with you. Prescriptions Printed Refills ALPRAZolam (XANAX) 0.5 mg tablet 2 Sig: Take 1 Tab by mouth nightly as needed for Anxiety. Max Daily Amount: 0.5 mg.  
 Class: Print Route: Oral  
 apixaban (ELIQUIS) 5 mg tablet 4 Sig: Take 1 Tab by mouth two (2) times a day. Class: Print Route: Oral  
  
Patient Instructions If you have any questions regarding [employer], you may call [employer] support at [phone]. Introducing [facility] & [facility] SERVICES! Dear Hilda Perez: Thank you for requesting a Scienion account. Our records indicate that you already have an active Scienion account. You can access your account anytime at [email or web address]. [email or web address] [email or web address] Did you know that you can access your hospital and ER discharge instructions at any time in Scienion? You can also review all of your test results from your hospital stay or ER visit. Additional Information If you have questions, please visit the Frequently Asked Questions section of the Scienion website at [email or web address] Remember, Scienion is NOT to be used for urgent needs. For medical emergencies, dial 911. Now available from your iPhone and Android! Please provide this summary of care documentation to your next provider. Your primary care clinician is listed as Gali Quinn. If you have any questions after today's visit, please call [phone].

## 2017-06-27 NOTE — LETTER
6/27/2017 2:59 PM 
 
Ms. Jessica Salas [place] [address] [phone] To whom this may concern- 
 
Ms Fabio Clarke is unable to work as a result of her back injury. It is unlikely that she will be able to return to work for several months. If I can be of further assistance, please contact me at [phone]. Sincerely, Riki Ricketts MD

## 2017-06-29 ENCOUNTER — TELEPHONE (OUTPATIENT)
Dept: FAMILY MEDICINE CLINIC | Age: 57
End: 2017-06-29

## 2017-06-29 NOTE — TELEPHONE ENCOUNTER
[facility] the foundation

## 2017-07-31 ENCOUNTER — DOCUMENTATION ONLY (OUTPATIENT)
Dept: OTHER | Age: 57
End: 2017-07-31

## 2017-07-31 NOTE — PROGRESS NOTES
Chart reviewed for GISEL resolution. No further ER or urgent center visits noted. Patient remains in close PCP follow-up. GISEL episode resolved.

## 2017-08-04 ENCOUNTER — OFFICE VISIT (OUTPATIENT)
Dept: FAMILY MEDICINE CLINIC | Age: 57
End: 2017-08-04

## 2017-08-04 VITALS
OXYGEN SATURATION: 95 % | RESPIRATION RATE: 16 BRPM | HEART RATE: 84 BPM | DIASTOLIC BLOOD PRESSURE: 86 MMHG | BODY MASS INDEX: 40.35 KG/M2 | SYSTOLIC BLOOD PRESSURE: 127 MMHG | WEIGHT: 250 LBS

## 2017-08-04 DIAGNOSIS — E66.01 MORBID OBESITY, UNSPECIFIED OBESITY TYPE (HCC): ICD-10-CM

## 2017-08-04 DIAGNOSIS — R10.13 DYSPEPSIA: ICD-10-CM

## 2017-08-04 DIAGNOSIS — I26.99 PULMONARY EMBOLISM, OTHER: Primary | ICD-10-CM

## 2017-08-04 DIAGNOSIS — K21.9 GASTROESOPHAGEAL REFLUX DISEASE WITHOUT ESOPHAGITIS: ICD-10-CM

## 2017-08-04 DIAGNOSIS — M53.9 BACK DISORDER: ICD-10-CM

## 2017-08-04 RX ORDER — VENLAFAXINE HYDROCHLORIDE 150 MG/1
150 CAPSULE, EXTENDED RELEASE ORAL DAILY
Qty: 90 CAP | Refills: 3 | Status: SHIPPED | OUTPATIENT
Start: 2017-08-04 | End: 2017-12-06 | Stop reason: CLARIF

## 2017-08-04 RX ORDER — ESOMEPRAZOLE MAGNESIUM 40 MG/1
40 CAPSULE, DELAYED RELEASE ORAL DAILY
Qty: 90 CAP | Refills: 1 | Status: SHIPPED | OUTPATIENT
Start: 2017-08-04 | End: 2017-12-06 | Stop reason: CLARIF

## 2017-08-04 RX ORDER — NORTRIPTYLINE HYDROCHLORIDE 25 MG/1
50 CAPSULE ORAL
Qty: 180 CAP | Refills: 3 | Status: SHIPPED | OUTPATIENT
Start: 2017-08-04

## 2017-08-04 RX ORDER — ZOLPIDEM TARTRATE 10 MG/1
10 TABLET ORAL
Qty: 30 TAB | Refills: 5 | Status: SHIPPED | OUTPATIENT
Start: 2017-08-04 | End: 2018-02-27 | Stop reason: SDUPTHER

## 2017-08-04 NOTE — MR AVS SNAPSHOT
Visit Information Date & Time Provider Department Dept. Phone Encounter #  
 8/4/2017  2:00 PM Gamaliel Peña MD [address] [place] [address] [id number] Your Appointments 10/6/2017  3:00 PM  
ESTABLISHED PATIENT with Gamaliel Peña MD  
Ch[place] Li ([address]) Appt Note: 2mo fu  
 1000 [facility] [address] [facility],[address] [address] [phone]  
  
   
 1000 [facility] [address] [facility],[address] [address] Upcoming Health Maintenance Date Due Hepatitis C Screening 1960 COLONOSCOPY 12/4/1977 DTaP/Tdap/Td series (1 - Tdap) 12/4/1980 PAP AKA CERVICAL CYTOLOGY 12/4/1980 INFLUENZA AGE 9 TO ADULT 8/1/2017 BREAST CANCER SCRN MAMMOGRAM 9/22/2017 Allergies as of 8/4/2017  Review Complete On: 8/4/2017 By: Gamaliel Peña MD  
  
 Severity Noted Reaction Type Reactions Pcn [Penicillins] High 03/15/2013    Rash, Swelling Other reaction(s): Rash Other reaction(s): Rash  
 Gabapentin  05/30/2017    Other (comments) Hallucinations/ sleep walking Griseofulvin  11/14/2014    Other (comments) Aaron-dirk syndrome Pcn [Penicillins]  03/15/2013    Rash, Swelling Tramadol  08/19/2015    Nausea Only, Drowsiness Other reaction(s): Drowsiness Interaction with Effexor Interaction with Effexor Current Immunizations  Reviewed on 4/28/2017 Name Date Influenza High Dose Vaccine PF 9/28/2016, 9/24/2015 Pneumococcal Conjugate (PCV-13) 8/26/2015 Not reviewed this visit You Were Diagnosed With   
  
 Codes Comments Pulmonary embolism, other ([facility]    -  Primary ICD-10-CM: I26.99 
ICD-9-CM: 415.19 Morbid obesity, unspecified obesity type ([facility]     ICD-10-CM: E66.01 
ICD-9-CM: 278.01 Back disorder     ICD-10-CM: M53.9 ICD-9-CM: 724.9 Vitals  BP Pulse Resp Weight(growth percentile) SpO2 BMI  
 127/86 (BP 1 Location: Left arm, BP Patient Position: Sitting) 84 16 250 lb (113.4 kg) 95% 40.35 kg/m2 OB Status Smoking Status Menopause Never Smoker Vitals History BMI and BSA Data Body Mass Index Body Surface Area  
 40.35 kg/m 2 2.3 m 2 Preferred Pharmacy Pharmacy Name Phone [facility] [phone] [place] [phone] [phone] Your Updated Medication List  
  
   
This list is accurate as of: 8/4/17  2:55 PM.  Always use your most recent med list.  
  
  
  
  
 * albuterol 2.5 mg /3 mL (0.083 %) nebulizer solution Commonly known as:  PROVENTIL VENTOLIN  
by Nebulization route once. * albuterol 90 mcg/actuation inhaler Commonly known as:  PROAIR HFA Take 2 Puffs by inhalation every four (4) hours as needed for Wheezing. albuterol-ipratropium 2.5 mg-0.5 mg/3 ml Nebu Commonly known as:  DUO-NEB  
3 mL by Nebulization route every four (4) hours. Indications: CHRONIC OBSTRUCTIVE PULMONARY DISEASE WITH BRONCHOSPASMS ALPRAZolam 0.5 mg tablet Commonly known as:  Larrie Sark Take 1 Tab by mouth nightly as needed for Anxiety. Max Daily Amount: 0.5 mg.  
  
 apixaban 5 mg tablet Commonly known as:  Verl Nailer Take 1 Tab by mouth two (2) times a day. BENADRYL 25 mg capsule Generic drug:  diphenhydrAMINE Take 25 mg by mouth every six (6) hours as needed. calcium carbonate 200 mg calcium (500 mg) Chew Commonly known as:  TUMS Take 3 Tabs by mouth daily. cyclobenzaprine 10 mg tablet Commonly known as:  FLEXERIL Take 1 Tab by mouth three (3) times daily as needed for Muscle Spasm(s). ergocalciferol 50,000 unit capsule Commonly known as:  ERGOCALCIFEROL Take 1 Cap by mouth every seven (7) days. esomeprazole 40 mg capsule Commonly known as:  [place] Take 1 Cap by mouth daily. ferrous sulfate 142 mg (45 mg iron) ER tablet Commonly known as:  SLOW FE Take 45 mg by mouth Daily (before breakfast). inhalational spacing device 1 Each by Does Not Apply route as needed. mometasone-formoterol 200-5 mcg/actuation HFA inhaler Commonly known as:  Stephanie Lion Take 2 Puffs by inhalation two (2) times a day. nortriptyline 25 mg capsule Commonly known as:  PAMELOR Take 2 Caps by mouth nightly. sucralfate 100 mg/mL suspension Commonly known as:  Neymar Otter Take 5 mL by mouth four (4) times daily. venlafaxine- mg capsule Commonly known as:  EFFEXOR XR Take 1 Cap by mouth daily. vitamin A 10,000 unit capsule Commonly known as:  AQUASOL A Take 10,000 Units by mouth daily. VITAMIN B-12 1,000 mcg tablet Generic drug:  cyanocobalamin Take 1,000 mcg by mouth daily. zolpidem 10 mg tablet Commonly known as:  AMBIEN Take 1 Tab by mouth nightly as needed for Sleep. Max Daily Amount: 10 mg.  
  
 * Notice: This list has 2 medication(s) that are the same as other medications prescribed for you. Read the directions carefully, and ask your doctor or other care provider to review them with you. Prescriptions Printed Refills  
 zolpidem (AMBIEN) 10 mg tablet 5 Sig: Take 1 Tab by mouth nightly as needed for Sleep. Max Daily Amount: 10 mg.  
 Class: Print Route: Oral  
  
Introducing \[facility]\"" & HEALTH SERVICES! Dear Barbara Faria: Thank you for requesting a Fiix account. Our records indicate that you already have an active Fiix account. You can access your account anytime at [email or web address]://Ac[email or web address]. [email or web address]/Ac[employer] Sensing Technology Did you know that you can access your hospital and ER discharge instructions at any time in Fiix? You can also review all of your test results from your hospital stay or ER visit. Additional Information If you have questions, please visit the Frequently Asked Questions section of the Fiix website at [email or web address]. [email or web address]/[email or web address]/. Remember, Fiix is NOT to be used for urgent needs. For medical emergencies, dial 911. Now available from your iPhone and Android! Please provide this summary of care documentation to your next provider. Your primary care clinician is listed as Jg Mccann. If you have any questions after today's visit, please call [phone].

## 2017-08-04 NOTE — PROGRESS NOTES
Chief Complaint   Patient presents with    COPD         HPI:       is a 61 y.o. female. She remains disabled following her PE several months ago. Severe LBP with DDD and RLE sciatica with right SI joint pain. Unable to squat, bend or lift > 10 pounds. Experiences SOB with 10 yards ambulation. Became SOB and diaphoretic after folding 5 items of clothing today. Requires the use of a cane while ascending stairs. Experiences PAF and also known to have Lupus anticoagulant. Allergies   Allergen Reactions    Pcn [Penicillins] Rash and Swelling     Other reaction(s): Rash  Other reaction(s): Rash    Gabapentin Other (comments)     Hallucinations/ sleep walking    Griseofulvin Other (comments)     Aaron-dirk syndrome    Pcn [Penicillins] Rash and Swelling    Tramadol Nausea Only and Drowsiness     Other reaction(s): Drowsiness  Interaction with Effexor  Interaction with Effexor       Current Outpatient Prescriptions   Medication Sig    zolpidem (AMBIEN) 10 mg tablet Take 1 Tab by mouth nightly as needed for Sleep. Max Daily Amount: 10 mg.    ALPRAZolam (XANAX) 0.5 mg tablet Take 1 Tab by mouth nightly as needed for Anxiety. Max Daily Amount: 0.5 mg.    diphenhydrAMINE (BENADRYL) 25 mg capsule Take 25 mg by mouth every six (6) hours as needed.  albuterol (PROVENTIL VENTOLIN) 2.5 mg /3 mL (0.083 %) nebulizer solution by Nebulization route once.  ergocalciferol (ERGOCALCIFEROL) 50,000 unit capsule Take 1 Cap by mouth every seven (7) days.  albuterol (PROAIR HFA) 90 mcg/actuation inhaler Take 2 Puffs by inhalation every four (4) hours as needed for Wheezing.  cyclobenzaprine (FLEXERIL) 10 mg tablet Take 1 Tab by mouth three (3) times daily as needed for Muscle Spasm(s).  albuterol-ipratropium (DUO-NEB) 2.5 mg-0.5 mg/3 ml nebulizer solution 3 mL by Nebulization route every four (4) hours.  Indications: CHRONIC OBSTRUCTIVE PULMONARY DISEASE WITH BRONCHOSPASMS    inhalational spacing device 1 Each by Does Not Apply route as needed.  venlafaxine-SR (EFFEXOR XR) 150 mg capsule Take 1 Cap by mouth daily.  nortriptyline (PAMELOR) 25 mg capsule Take 2 Caps by mouth nightly.  esomeprazole (NEXIUM) 40 mg capsule Take 1 Cap by mouth daily.  apixaban (ELIQUIS) 5 mg tablet Take 1 Tab by mouth two (2) times a day.  calcium carbonate (TUMS) 200 mg calcium (500 mg) chew Take 3 Tabs by mouth daily.  vitamin A (AQUASOL A) 10,000 unit capsule Take 10,000 Units by mouth daily.  cyanocobalamin (VITAMIN B-12) 1,000 mcg tablet Take 1,000 mcg by mouth daily.  ferrous sulfate (SLOW FE) 142 mg (45 mg iron) ER tablet Take 45 mg by mouth Daily (before breakfast).  mometasone-formoterol (DULERA) 200-5 mcg/actuation HFA inhaler Take 2 Puffs by inhalation two (2) times a day.  sucralfate (CARAFATE) 100 mg/mL suspension Take 5 mL by mouth four (4) times daily. No current facility-administered medications for this visit. Past Medical History:   Diagnosis Date    Anemia 3/15/2013    Arrhythmia     paroxsimal afib    Asthma 3/15/2013    Asthma     Back disorder     disc problem    Chronic obstructive pulmonary disease (HCC)     Morbid obesity ([facility] 3/15/2013         ROS:  Denies fever, chills, cough, chest pain, SOB,  nausea, vomiting, or diarrhea. Denies wt loss, wt gain, hemoptysis, hematochezia or melena. Physical Examination:    /86 (BP 1 Location: Left arm, BP Patient Position: Sitting)  Pulse 84  Resp 16  Wt 250 lb (113.4 kg)  SpO2 95%  BMI 40.35 kg/m2    General: Alert and Ox3, Fluent speech  HEENT:  NC/AT, EOMI, OP: clear  Neck:  Supple, no adenopathy, JVD, mass or bruit  Chest:  Clear to Ausculation, without wheezes, rales, rubs or ronchi  Cardiac: RRR  Abdomen:  +BS, soft, nontender without palpable HSM  Extremities:  No cyanosis, clubbing or edema  Neurologic:  Ambulatory without assist, CN 2-12 grossly intact. Moves all extremities.   Skin: no rash  Lymphadenopathy: no cervical or supraclavicular nodes    Wt Readings from Last 3 Encounters:   08/04/17 250 lb (113.4 kg)   06/27/17 251 lb (113.9 kg)   05/30/17 252 lb 12.8 oz (114.7 kg)       ASSESSMENT AND PLAN:     1. Fibromyalgia:   Continue current meds. She will feel better with wt loss and better sleep. Will increase Zolpidem to 10 mg HS. 2.  Remains SOB with TURNER since her PE 5 months ago. May be a year of more until we are able to benchmark her pulmonary status. Weight loss with help. 3.  Back pain with radiculopathy:  Continuing exercises and weight loss through diet   4. I have reviewed/discussed the above normal BMI with the patient. I have recommended the following interventions: dietary management education, guidance, and counseling . Kathy Katz Orders Placed This Encounter    zolpidem (AMBIEN) 10 mg tablet     Sig: Take 1 Tab by mouth nightly as needed for Sleep. Max Daily Amount: 10 mg.      Dispense:  30 Tab     Refill:  5       Lizzy Gonzalez MD, [address]

## 2017-10-06 ENCOUNTER — OFFICE VISIT (OUTPATIENT)
Dept: FAMILY MEDICINE CLINIC | Age: 57
End: 2017-10-06

## 2017-10-06 VITALS
HEART RATE: 99 BPM | HEIGHT: 66 IN | OXYGEN SATURATION: 97 % | RESPIRATION RATE: 16 BRPM | BODY MASS INDEX: 39.7 KG/M2 | TEMPERATURE: 97.7 F | WEIGHT: 247 LBS

## 2017-10-06 DIAGNOSIS — I27.82 OTHER CHRONIC PULMONARY EMBOLISM WITHOUT ACUTE COR PULMONALE (HCC): Primary | ICD-10-CM

## 2017-10-06 DIAGNOSIS — M53.9 BACK DISORDER: ICD-10-CM

## 2017-10-06 DIAGNOSIS — E66.01 MORBID OBESITY (HCC): ICD-10-CM

## 2017-10-06 DIAGNOSIS — Z23 ENCOUNTER FOR IMMUNIZATION: ICD-10-CM

## 2017-10-06 NOTE — PROGRESS NOTES
Chief Complaint   Patient presents with    COPD         HPI:       is a 61 y.o. female. She remains disabled following her PE several months ago. Severe LBP with DDD and RLE sciatica with right SI joint pain. Unable to squat, bend or lift > 10 pounds. Experiences SOB with 10 yards ambulation. Became SOB and diaphoretic about a week ago and this lasted 2-3 days. Had skipped several doses of Eliquis. Requires the use of a cane while ascending stairs. Experiences PAF and also known to have Lupus anticoagulant. Allergies   Allergen Reactions    Pcn [Penicillins] Rash and Swelling     Other reaction(s): Rash  Other reaction(s): Rash    Gabapentin Other (comments)     Hallucinations/ sleep walking    Griseofulvin Other (comments)     Aaron-dirk syndrome    Pcn [Penicillins] Rash and Swelling    Tramadol Nausea Only and Drowsiness     Other reaction(s): Drowsiness  Interaction with Effexor  Interaction with Effexor       Current Outpatient Prescriptions   Medication Sig    zolpidem (AMBIEN) 10 mg tablet Take 1 Tab by mouth nightly as needed for Sleep. Max Daily Amount: 10 mg.    ALPRAZolam (XANAX) 0.5 mg tablet Take 1 Tab by mouth nightly as needed for Anxiety. Max Daily Amount: 0.5 mg.    diphenhydrAMINE (BENADRYL) 25 mg capsule Take 25 mg by mouth every six (6) hours as needed.  albuterol (PROVENTIL VENTOLIN) 2.5 mg /3 mL (0.083 %) nebulizer solution by Nebulization route once.  ergocalciferol (ERGOCALCIFEROL) 50,000 unit capsule Take 1 Cap by mouth every seven (7) days.  albuterol (PROAIR HFA) 90 mcg/actuation inhaler Take 2 Puffs by inhalation every four (4) hours as needed for Wheezing.  cyclobenzaprine (FLEXERIL) 10 mg tablet Take 1 Tab by mouth three (3) times daily as needed for Muscle Spasm(s).  albuterol-ipratropium (DUO-NEB) 2.5 mg-0.5 mg/3 ml nebulizer solution 3 mL by Nebulization route every four (4) hours.  Indications: CHRONIC OBSTRUCTIVE PULMONARY DISEASE WITH BRONCHOSPASMS    inhalational spacing device 1 Each by Does Not Apply route as needed.  venlafaxine-SR (EFFEXOR XR) 150 mg capsule Take 1 Cap by mouth daily.  nortriptyline (PAMELOR) 25 mg capsule Take 2 Caps by mouth nightly.  esomeprazole (NEXIUM) 40 mg capsule Take 1 Cap by mouth daily.  apixaban (ELIQUIS) 5 mg tablet Take 1 Tab by mouth two (2) times a day.  calcium carbonate (TUMS) 200 mg calcium (500 mg) chew Take 3 Tabs by mouth daily.  vitamin A (AQUASOL A) 10,000 unit capsule Take 10,000 Units by mouth daily.  cyanocobalamin (VITAMIN B-12) 1,000 mcg tablet Take 1,000 mcg by mouth daily.  ferrous sulfate (SLOW FE) 142 mg (45 mg iron) ER tablet Take 45 mg by mouth Daily (before breakfast).  mometasone-formoterol (DULERA) 200-5 mcg/actuation HFA inhaler Take 2 Puffs by inhalation two (2) times a day.  sucralfate (CARAFATE) 100 mg/mL suspension Take 5 mL by mouth four (4) times daily. No current facility-administered medications for this visit. Past Medical History:   Diagnosis Date    Anemia 3/15/2013    Arrhythmia     paroxsimal afib    Asthma 3/15/2013    Asthma     Back disorder     disc problem    Chronic obstructive pulmonary disease (HCC)     Morbid obesity ([facility] 3/15/2013         ROS:  Denies fever, chills, cough, chest pain, SOB,  nausea, vomiting, or diarrhea. Denies wt loss, wt gain, hemoptysis, hematochezia or melena. Physical Examination:    Pulse 99  Temp 97.7 °F (36.5 °C) (Oral)   Resp 16  Ht 5' 6\" (1.676 m)  Wt 247 lb (112 kg)  SpO2 97%  BMI 39.87 kg/m2      General: Alert and Ox3, Fluent speech  HEENT:  NC/AT, EOMI, OP: clear  Neck:  Supple, no adenopathy, JVD, mass or bruit  Chest:  Clear to Ausculation, without wheezes, rales, rubs or ronchi  Cardiac: RRR  Abdomen:  +BS, soft, nontender without palpable HSM  Extremities:  No cyanosis, clubbing or edema  Neurologic:  Ambulatory without assist, CN 2-12 grossly intact. Moves all extremities. Skin: no rash  Lymphadenopathy: no cervical or supraclavicular nodes    Wt Readings from Last 3 Encounters:   10/06/17 247 lb (112 kg)   08/04/17 250 lb (113.4 kg)   06/27/17 251 lb (113.9 kg)       ASSESSMENT AND PLAN:     1. Fibromyalgia:   Continue current meds. She will feel better with wt loss and better sleep. Will increase Zolpidem to 10 mg HS. 2.  Remains SOB with TURNER since her PE 5 months ago. May be a year of more until we are able to benchmark her pulmonary status. Weight loss with help. 3.  Back pain with radiculopathy:  Continuing exercises and weight loss through diet   4. I have reviewed/discussed the above normal BMI with the patient. I have recommended the following interventions: dietary management education, guidance, and counseling . 5. She may have had small PEs last week. Urged to remain compliant with Eliquis        Orders Placed This Encounter    zolpidem (AMBIEN) 10 mg tablet     Sig: Take 1 Tab by mouth nightly as needed for Sleep. Max Daily Amount: 10 mg.      Dispense:  30 Tab     Refill:  5       Perla Odonnell MD, [address]

## 2017-10-06 NOTE — MR AVS SNAPSHOT
Visit Information Date & Time Provider Department Dept. Phone Encounter #  
 10/6/2017  3:00 PM Celi Cisneros MD [address] [place] [id number] Follow-up Instructions Return in about 4 weeks (around 11/3/2017). Upcoming Health Maintenance Date Due Hepatitis C Screening 1960 COLONOSCOPY 12/4/1977 DTaP/Tdap/Td series (1 - Tdap) 12/4/1980 PAP AKA CERVICAL CYTOLOGY 12/4/1980 INFLUENZA AGE 9 TO ADULT 8/1/2017 BREAST CANCER SCRN MAMMOGRAM 9/22/2017 Allergies as of 10/6/2017  Review Complete On: 10/6/2017 By: Celi Cisneros MD  
  
 Severity Noted Reaction Type Reactions Pcn [Penicillins] High 03/15/2013    Rash, Swelling Other reaction(s): Rash Other reaction(s): Rash  
 Gabapentin  05/30/2017    Other (comments) Hallucinations/ sleep walking Griseofulvin  11/14/2014    Other (comments) Aaron-dirk syndrome Pcn [Penicillins]  03/15/2013    Rash, Swelling Tramadol  08/19/2015    Nausea Only, Drowsiness Other reaction(s): Drowsiness Interaction with Effexor Interaction with Effexor Current Immunizations  Reviewed on 4/28/2017 Name Date Influenza High Dose Vaccine PF  Incomplete, 9/28/2016, 9/24/2015 Pneumococcal Conjugate (PCV-13) 8/26/2015 Not reviewed this visit You Were Diagnosed With   
  
 Codes Comments Other chronic pulmonary embolism without acute cor pulmonale (HCC)    -  Primary ICD-10-CM: D40.85 
ICD-9-CM: 416.2 Morbid obesity ([facility] [address]     ICD-10-CM: E66.01 
ICD-9-CM: 278.01 Back disorder     ICD-10-CM: M53.9 ICD-9-CM: 724.9 Encounter for immunization     ICD-10-CM: R00 ICD-9-CM: V03.89 Vitals Pulse Temp Resp Height(growth percentile) Weight(growth percentile) SpO2  
 99 97.7 °F (36.5 °C) (Oral) 16 5' 6\" (1.676 m) 247 lb (112 kg) 97% BMI OB Status Smoking Status 39.87 kg/m2 Menopause Never Smoker BMI and BSA Data Body Mass Index Body Surface Area  
 39.87 kg/m 2 2.28 m 2 Preferred Pharmacy Pharmacy Name Phone [facility] [address] [facility], Eren Peterson [phone] Your Updated Medication List  
  
   
This list is accurate as of: 10/6/17  4:17 PM.  Always use your most recent med list.  
  
  
  
  
 * albuterol 2.5 mg /3 mL (0.083 %) nebulizer solution Commonly known as:  PROVENTIL VENTOLIN  
by Nebulization route once. * albuterol 90 mcg/actuation inhaler Commonly known as:  PROAIR HFA Take 2 Puffs by inhalation every four (4) hours as needed for Wheezing. albuterol-ipratropium 2.5 mg-0.5 mg/3 ml Nebu Commonly known as:  DUO-NEB  
3 mL by Nebulization route every four (4) hours. Indications: CHRONIC OBSTRUCTIVE PULMONARY DISEASE WITH BRONCHOSPASMS ALPRAZolam 0.5 mg tablet Commonly known as:  Gemma Neisha Take 1 Tab by mouth nightly as needed for Anxiety. Max Daily Amount: 0.5 mg.  
  
 apixaban 5 mg tablet Commonly known as:  Tosin Bergan Take 1 Tab by mouth two (2) times a day. BENADRYL 25 mg capsule Generic drug:  diphenhydrAMINE Take 25 mg by mouth every six (6) hours as needed. calcium carbonate 200 mg calcium (500 mg) Chew Commonly known as:  TUMS Take 3 Tabs by mouth daily. cyclobenzaprine 10 mg tablet Commonly known as:  FLEXERIL Take 1 Tab by mouth three (3) times daily as needed for Muscle Spasm(s). ergocalciferol 50,000 unit capsule Commonly known as:  ERGOCALCIFEROL Take 1 Cap by mouth every seven (7) days. esomeprazole 40 mg capsule Commonly known as:  Gallego Gaunt Take 1 Cap by mouth daily. ferrous sulfate 142 mg (45 mg iron) ER tablet Commonly known as:  SLOW FE Take 45 mg by mouth Daily (before breakfast). inhalational spacing device 1 Each by Does Not Apply route as needed. mometasone-formoterol 200-5 mcg/actuation HFA inhaler Commonly known as:  Maryam Mora Take 2 Puffs by inhalation two (2) times a day. nortriptyline 25 mg capsule Commonly known as:  PAMELOR Take 2 Caps by mouth nightly. sucralfate 100 mg/mL suspension Commonly known as:  Charm Ceferino Take 5 mL by mouth four (4) times daily. venlafaxine- mg capsule Commonly known as:  EFFEXOR XR Take 1 Cap by mouth daily. vitamin A 10,000 unit capsule Commonly known as:  AQUASOL A Take 10,000 Units by mouth daily. VITAMIN B-12 1,000 mcg tablet Generic drug:  cyanocobalamin Take 1,000 mcg by mouth daily. zolpidem 10 mg tablet Commonly known as:  AMBIEN Take 1 Tab by mouth nightly as needed for Sleep. Max Daily Amount: 10 mg.  
  
 * Notice: This list has 2 medication(s) that are the same as other medications prescribed for you. Read the directions carefully, and ask your doctor or other care provider to review them with you. We Performed the Following AMB POC EKG ROUTINE W/ 12 LEADS, INTER & REP [59745 CPT(R)] INFLUENZA VIRUS VACCINE, HIGH DOSE SEASONAL, PRESERVATIVE FREE [46615 CPT(R)] MA IMMUNIZ ADMIN,1 SINGLE/COMB VAC/TOXOID [id number] CPT(R)] Follow-up Instructions Return in about 4 weeks (around 11/3/2017). Introducing [facility] & HEALTH SERVICES! Dear Susi Vinson: Thank you for requesting a Simmery account. Our records indicate that you already have an active Simmery account. You can access your account anytime at [email or web address] Did you know that you can access your hospital and ER discharge instructions at any time in Simmery? You can also review all of your test results from your hospital stay or ER visit. Additional Information If you have questions, please visit the Frequently Asked Questions section of the Simmery website at [email or web address] Remember, Simmery is NOT to be used for urgent needs. For medical emergencies, dial 911. Now available from your iPhone and Android! Please provide this summary of care documentation to your next provider. Your primary care clinician is listed as Keysha Carrera. If you have any questions after today's visit, please call [phone].

## 2017-10-30 ENCOUNTER — DOCUMENTATION ONLY (OUTPATIENT)
Dept: FAMILY MEDICINE CLINIC | Age: 57
End: 2017-10-30

## 2017-10-30 NOTE — PROGRESS NOTES
Ms Rajesh Crandall had a difficult weekend and experienced rage and then suicidal ideation. She is better now and is no lomnger suicidal or raging. She would like to resume her Cymbalta and is going to the drugstore today to have this filled. She is frustrated that her options are limited as she presently has no income or insurance. I advised her to adopt the following plan:    1. Start Cymbalta today  2. Keep her appt with me later in November  3. Contact the [facility] of  for mental health referral information    4. Stop by the [facility] today for financial screening. I can see her there at no cost to her and there are prescription and referral resources available to her there. 5.  If she becomes suicidal or raging again, she agrees to go to the ER at [facility] for elective admission.       Radha Roland

## 2017-11-08 ENCOUNTER — OFFICE VISIT (OUTPATIENT)
Dept: FAMILY MEDICINE CLINIC | Age: 57
End: 2017-11-08

## 2017-11-08 VITALS
DIASTOLIC BLOOD PRESSURE: 80 MMHG | HEART RATE: 84 BPM | RESPIRATION RATE: 16 BRPM | OXYGEN SATURATION: 98 % | BODY MASS INDEX: 39.86 KG/M2 | SYSTOLIC BLOOD PRESSURE: 108 MMHG | HEIGHT: 66 IN | WEIGHT: 248 LBS

## 2017-11-08 DIAGNOSIS — M53.9 BACK DISORDER: ICD-10-CM

## 2017-11-08 DIAGNOSIS — E66.01 MORBID OBESITY (HCC): ICD-10-CM

## 2017-11-08 DIAGNOSIS — I27.82 OTHER CHRONIC PULMONARY EMBOLISM WITHOUT ACUTE COR PULMONALE (HCC): Primary | ICD-10-CM

## 2017-11-08 DIAGNOSIS — F32.9 REACTIVE DEPRESSION: ICD-10-CM

## 2017-11-08 NOTE — MR AVS SNAPSHOT
Visit Information Date & Time Provider Department Dept. Phone Encounter #  
 11/8/2017  3:30 PM Holland Parker, [facility] [id number] Your Appointments 12/6/2017  2:30 PM  
ESTABLISHED PATIENT with MD Chaya Solis (Nigel Hussainemery) Appt Note: 4 wk  
 [address] [facility] [address] [facility],[address] [address] [phone]  
  
   
 [address] [address] Upcoming Health Maintenance Date Due Hepatitis C Screening 1960 COLONOSCOPY 12/4/1977 DTaP/Tdap/Td series (1 - Tdap) 12/4/1980 PAP AKA CERVICAL CYTOLOGY 12/4/1980 BREAST CANCER SCRN MAMMOGRAM 9/22/2017 Allergies as of 11/8/2017  Review Complete On: 11/8/2017 By: Holland Parker MD  
  
 Severity Noted Reaction Type Reactions Pcn [Penicillins] High 03/15/2013    Rash, Swelling Other reaction(s): Rash Other reaction(s): Rash  
 Gabapentin  05/30/2017    Other (comments) Hallucinations/ sleep walking Griseofulvin  11/14/2014    Other (comments) Aaron-dirk syndrome Pcn [Penicillins]  03/15/2013    Rash, Swelling Tramadol  08/19/2015    Nausea Only, Drowsiness Other reaction(s): Drowsiness Interaction with Effexor Interaction with Effexor Current Immunizations  Reviewed on 4/28/2017 Name Date Influenza High Dose Vaccine PF 10/6/2017  4:17 PM, 9/28/2016, 9/24/2015 Pneumococcal Conjugate (PCV-13) 8/26/2015 Not reviewed this visit Vitals BP Pulse Resp Height(growth percentile) Weight(growth percentile) SpO2  
 108/80 (BP 1 Location: Left arm, BP Patient Position: Sitting) 84 16 5' 6\" (1.676 m) 248 lb (112.5 kg) 98% BMI OB Status Smoking Status 40.03 kg/m2 Menopause Never Smoker Vitals History BMI and BSA Data Body Mass Index Body Surface Area 40.03 kg/m 2 2.29 m 2 Preferred Pharmacy Pharmacy Name Phone [facility] [address] [facility], [address] [phone] Your Updated Medication List  
  
   
This list is accurate as of: 11/8/17  4:54 PM.  Always use your most recent med list.  
  
  
  
  
 * albuterol 2.5 mg /3 mL (0.083 %) nebulizer solution Commonly known as:  PROVENTIL VENTOLIN  
by Nebulization route once. * albuterol 90 mcg/actuation inhaler Commonly known as:  PROAIR HFA Take 2 Puffs by inhalation every four (4) hours as needed for Wheezing. albuterol-ipratropium 2.5 mg-0.5 mg/3 ml Nebu Commonly known as:  DUO-NEB  
3 mL by Nebulization route every four (4) hours. Indications: CHRONIC OBSTRUCTIVE PULMONARY DISEASE WITH BRONCHOSPASMS ALPRAZolam 0.5 mg tablet Commonly known as:  Pixie Oz Take 1 Tab by mouth nightly as needed for Anxiety. Max Daily Amount: 0.5 mg.  
  
 apixaban 5 mg tablet Commonly known as:  Fern Scripture Take 1 Tab by mouth two (2) times a day. BENADRYL 25 mg capsule Generic drug:  diphenhydrAMINE Take 25 mg by mouth every six (6) hours as needed. calcium carbonate 200 mg calcium (500 mg) Chew Commonly known as:  TUMS Take 3 Tabs by mouth daily. cyclobenzaprine 10 mg tablet Commonly known as:  FLEXERIL Take 1 Tab by mouth three (3) times daily as needed for Muscle Spasm(s). ergocalciferol 50,000 unit capsule Commonly known as:  ERGOCALCIFEROL Take 1 Cap by mouth every seven (7) days. esomeprazole 40 mg capsule Commonly known as:  Delayne Crate Take 1 Cap by mouth daily. ferrous sulfate 142 mg (45 mg iron) ER tablet Commonly known as:  SLOW FE Take 45 mg by mouth Daily (before breakfast). inhalational spacing device 1 Each by Does Not Apply route as needed. mometasone-formoterol 200-5 mcg/actuation HFA inhaler Commonly known as:  Saskia Butts Take 2 Puffs by inhalation two (2) times a day. nortriptyline 25 mg capsule Commonly known as:  PAMELOR  
 Take 2 Caps by mouth nightly. sucralfate 100 mg/mL suspension Commonly known as:  Beather Bun Take 5 mL by mouth four (4) times daily. venlafaxine- mg capsule Commonly known as:  EFFEXOR XR Take 1 Cap by mouth daily. vitamin A 10,000 unit capsule Commonly known as:  AQUASOL A Take 10,000 Units by mouth daily. VITAMIN B-12 1,000 mcg tablet Generic drug:  cyanocobalamin Take 1,000 mcg by mouth daily. zolpidem 10 mg tablet Commonly known as:  AMBIEN Take 1 Tab by mouth nightly as needed for Sleep. Max Daily Amount: 10 mg.  
  
 * Notice: This list has 2 medication(s) that are the same as other medications prescribed for you. Read the directions carefully, and ask your doctor or other care provider to review them with you. Introducing [facility] & HEALTH SERVICES! Dear Juany Chacon: Thank you for requesting a Bubbli account. Our records indicate that you already have an active Bubbli account. You can access your account anytime at [email or web address] Did you know that you can access your hospital and ER discharge instructions at any time in Bubbli? You can also review all of your test results from your hospital stay or ER visit. Additional Information If you have questions, please visit the Frequently Asked Questions section of the Bubbli website at [email or web address] Remember, Bubbli is NOT to be used for urgent needs. For medical emergencies, dial 911. Now available from your iPhone and Android! Please provide this summary of care documentation to your next provider. Your primary care clinician is listed as Sandy Cohen. If you have any questions after today's visit, please call [phone].

## 2017-11-08 NOTE — PROGRESS NOTES
Chief Complaint   Patient presents with    Anxiety         HPI:       is a 61 y.o. female. She remains disabled following her PE several months ago. Severe LBP with DDD and RLE sciatica with right SI joint pain. Unable to squat, bend or lift > 10 pounds. Experiences SOB with 10 yards ambulation. Became SOB and diaphoretic about a week ago and this lasted 2-3 days. Had skipped several doses of Eliquis. Requires the use of a cane while ascending stairs. Experiences PAF and also known to have Lupus anticoagulant. Ms Justice Franklin had a difficult weekend and experienced rage and then suicidal ideation. She is better now and is no lomnger suicidal or raging. She would like to resume her Cymbalta and is going to the drugstore today to have this filled. She is frustrated that her options are limited as she presently has no income or insurance. Allergies   Allergen Reactions    Pcn [Penicillins] Rash and Swelling     Other reaction(s): Rash  Other reaction(s): Rash    Gabapentin Other (comments)     Hallucinations/ sleep walking    Griseofulvin Other (comments)     Aaron-dirk syndrome    Pcn [Penicillins] Rash and Swelling    Tramadol Nausea Only and Drowsiness     Other reaction(s): Drowsiness  Interaction with Effexor  Interaction with Effexor       Current Outpatient Prescriptions   Medication Sig    venlafaxine-SR (EFFEXOR XR) 150 mg capsule Take 1 Cap by mouth daily.  esomeprazole (NEXIUM) 40 mg capsule Take 1 Cap by mouth daily.  ALPRAZolam (XANAX) 0.5 mg tablet Take 1 Tab by mouth nightly as needed for Anxiety. Max Daily Amount: 0.5 mg.    apixaban (ELIQUIS) 5 mg tablet Take 1 Tab by mouth two (2) times a day.  diphenhydrAMINE (BENADRYL) 25 mg capsule Take 25 mg by mouth every six (6) hours as needed.  calcium carbonate (TUMS) 200 mg calcium (500 mg) chew Take 3 Tabs by mouth daily.     albuterol (PROVENTIL VENTOLIN) 2.5 mg /3 mL (0.083 %) nebulizer solution by Nebulization route once.  ergocalciferol (ERGOCALCIFEROL) 50,000 unit capsule Take 1 Cap by mouth every seven (7) days.  mometasone-formoterol (DULERA) 200-5 mcg/actuation HFA inhaler Take 2 Puffs by inhalation two (2) times a day.  albuterol (PROAIR HFA) 90 mcg/actuation inhaler Take 2 Puffs by inhalation every four (4) hours as needed for Wheezing.  sucralfate (CARAFATE) 100 mg/mL suspension Take 5 mL by mouth four (4) times daily.  cyclobenzaprine (FLEXERIL) 10 mg tablet Take 1 Tab by mouth three (3) times daily as needed for Muscle Spasm(s).  albuterol-ipratropium (DUO-NEB) 2.5 mg-0.5 mg/3 ml nebulizer solution 3 mL by Nebulization route every four (4) hours. Indications: CHRONIC OBSTRUCTIVE PULMONARY DISEASE WITH BRONCHOSPASMS    inhalational spacing device 1 Each by Does Not Apply route as needed.  nortriptyline (PAMELOR) 25 mg capsule Take 2 Caps by mouth nightly.  zolpidem (AMBIEN) 10 mg tablet Take 1 Tab by mouth nightly as needed for Sleep. Max Daily Amount: 10 mg.    vitamin A (AQUASOL A) 10,000 unit capsule Take 10,000 Units by mouth daily.  cyanocobalamin (VITAMIN B-12) 1,000 mcg tablet Take 1,000 mcg by mouth daily.  ferrous sulfate (SLOW FE) 142 mg (45 mg iron) ER tablet Take 45 mg by mouth Daily (before breakfast). No current facility-administered medications for this visit. Past Medical History:   Diagnosis Date    Anemia 3/15/2013    Arrhythmia     paroxsimal afib    Asthma 3/15/2013    Asthma     Back disorder     disc problem    Chronic obstructive pulmonary disease (HCC)     Morbid obesity ([facility] [address] 3/15/2013         ROS:  Denies fever, chills, cough, chest pain, SOB,  nausea, vomiting, or diarrhea. Denies wt loss, wt gain, hemoptysis, hematochezia or melena.     Physical Examination:    /80 (BP 1 Location: Left arm, BP Patient Position: Sitting)  Pulse 84  Resp 16  Ht 5' 6\" (1.676 m)  Wt 248 lb (112.5 kg)  SpO2 98%  BMI 40.03 kg/m2    General: Alert and Ox3, Fluent speech  HEENT:  NC/AT, EOMI, OP: clear  Neck:  Supple, no adenopathy, JVD, mass or bruit  Chest:  Clear to Ausculation, without wheezes, rales, rubs or ronchi  Cardiac: RRR  Abdomen:  +BS, soft, nontender without palpable HSM  Extremities:  No cyanosis, clubbing or edema  Neurologic:  Ambulatory without assist, CN 2-12 grossly intact. Moves all extremities. Skin: no rash  Lymphadenopathy: no cervical or supraclavicular nodes      ASSESSMENT AND PLAN:     I advised her to adopt the following plan:     1.  Start Cymbalta today  2. Keep her appt with me later in November  3. Contact the [facility] of  for mental health referral information     4. Stop by the [facility] clinic today for financial screening. I can see her there at no cost to her and there are prescription and referral resources available to her there.     5. If she becomes suicidal or raging again, she agrees to go to the ER at [facility] for elective admission. 6.  She will need plain films and a bone scan, labs when she is able to be seen at the [facility] or if she is granted a Care Card. No orders of the defined types were placed in this encounter.       Jazmyne Cheatham MD, [address]

## 2017-11-09 DIAGNOSIS — F41.9 ANXIETY: ICD-10-CM

## 2017-11-09 NOTE — TELEPHONE ENCOUNTER
[phone] contact # fam Leonanne Terra please submitt notes from visit on Wednesday, 11/08/2017 to the following  Zeeshan Ferreira @ [employer] [phone]([facility] #)  Claim # [id number] needs to go on cover sheet for [employer] and need refill on the following medications to [address] [place] [address] :  ALPRAZolam (XANAX) 0.5 mg tablet have 5 tablets left.   Thanks,

## 2017-11-10 RX ORDER — ALPRAZOLAM 0.5 MG/1
0.5 TABLET ORAL
Qty: 30 TAB | Refills: 2 | Status: SHIPPED | OUTPATIENT
Start: 2017-11-10 | End: 2018-02-27 | Stop reason: SDUPTHER

## 2017-12-06 ENCOUNTER — OFFICE VISIT (OUTPATIENT)
Dept: FAMILY MEDICINE CLINIC | Age: 57
End: 2017-12-06

## 2017-12-06 VITALS
OXYGEN SATURATION: 95 % | HEART RATE: 78 BPM | TEMPERATURE: 98.4 F | RESPIRATION RATE: 18 BRPM | BODY MASS INDEX: 40.18 KG/M2 | SYSTOLIC BLOOD PRESSURE: 120 MMHG | DIASTOLIC BLOOD PRESSURE: 84 MMHG | WEIGHT: 250 LBS | HEIGHT: 66 IN

## 2017-12-06 DIAGNOSIS — M79.7 FIBROMYALGIA: Primary | ICD-10-CM

## 2017-12-06 RX ORDER — DULOXETIN HYDROCHLORIDE 30 MG/1
90 CAPSULE, DELAYED RELEASE ORAL DAILY
COMMUNITY

## 2017-12-06 RX ORDER — IBUPROFEN 200 MG
TABLET ORAL
COMMUNITY

## 2017-12-06 RX ORDER — OMEPRAZOLE 20 MG/1
20 CAPSULE, DELAYED RELEASE ORAL DAILY
COMMUNITY

## 2017-12-06 NOTE — MR AVS SNAPSHOT
Visit Information Date & Time Provider Department Dept. Phone Encounter #  
 12/6/2017  2:30 PM Nirmal Peña MD [facility] [id number] Upcoming Health Maintenance Date Due Hepatitis C Screening 1960 COLONOSCOPY 12/4/1977 DTaP/Tdap/Td series (1 - Tdap) 12/4/1980 PAP AKA CERVICAL CYTOLOGY 12/4/1980 BREAST CANCER SCRN MAMMOGRAM 9/22/2017 Allergies as of 12/6/2017  Review Complete On: 12/6/2017 By: Nirmal Peña MD  
  
 Severity Noted Reaction Type Reactions Pcn [Penicillins] High 03/15/2013    Rash, Swelling Other reaction(s): Rash Other reaction(s): Rash  
 Gabapentin  05/30/2017    Other (comments) Hallucinations/ sleep walking Griseofulvin  11/14/2014    Other (comments) Aaron-dirk syndrome Pcn [Penicillins]  03/15/2013    Rash, Swelling Tramadol  08/19/2015    Nausea Only, Drowsiness Other reaction(s): Drowsiness Interaction with Effexor Interaction with Effexor Current Immunizations  Reviewed on 4/28/2017 Name Date Influenza High Dose Vaccine PF 10/6/2017  4:17 PM, 9/28/2016, 9/24/2015 Pneumococcal Conjugate (PCV-13) 8/26/2015 Not reviewed this visit Vitals BP Pulse Temp Resp Height(growth percentile) Weight(growth percentile) 120/84 (BP 1 Location: Left arm, BP Patient Position: Sitting) 78 98.4 °F (36.9 °C) (Oral) 18 5' 6\" (1.676 m) 250 lb (113.4 kg) SpO2 BMI OB Status Smoking Status 95% 40.35 kg/m2 Menopause Never Smoker BMI and BSA Data Body Mass Index Body Surface Area  
 40.35 kg/m 2 2.3 m 2 Preferred Pharmacy Pharmacy Name Phone [facility] [address] [facility], [address] [facility] [phone] Your Updated Medication List  
  
   
This list is accurate as of: 12/6/17  3:32 PM.  Always use your most recent med list.  
  
  
  
  
 * albuterol 2.5 mg /3 mL (0.083 %) nebulizer solution Commonly known as:  PROVENTIL VENTOLIN  
by Nebulization route once. * albuterol 90 mcg/actuation inhaler Commonly known as:  PROAIR HFA Take 2 Puffs by inhalation every four (4) hours as needed for Wheezing. albuterol-ipratropium 2.5 mg-0.5 mg/3 ml Nebu Commonly known as:  DUO-NEB  
3 mL by Nebulization route every four (4) hours. Indications: CHRONIC OBSTRUCTIVE PULMONARY DISEASE WITH BRONCHOSPASMS ALPRAZolam 0.5 mg tablet Commonly known as:  Sharyon Kida Take 1 Tab by mouth nightly as needed for Anxiety. Max Daily Amount: 0.5 mg.  
  
 apixaban 5 mg tablet Commonly known as:  Xiao Eduar Take 1 Tab by mouth two (2) times a day. BENADRYL 25 mg capsule Generic drug:  diphenhydrAMINE Take 25 mg by mouth every six (6) hours as needed. calcium carbonate 200 mg calcium (500 mg) Chew Commonly known as:  TUMS Take 3 Tabs by mouth daily. cyclobenzaprine 10 mg tablet Commonly known as:  FLEXERIL Take 1 Tab by mouth three (3) times daily as needed for Muscle Spasm(s). CYMBALTA 30 mg capsule Generic drug:  DULoxetine Take 90 mg by mouth daily. ergocalciferol 50,000 unit capsule Commonly known as:  ERGOCALCIFEROL Take 1 Cap by mouth every seven (7) days. ferrous sulfate 142 mg (45 mg iron) ER tablet Commonly known as:  SLOW FE Take 45 mg by mouth Daily (before breakfast). ibuprofen 200 mg tablet Commonly known as:  MOTRIN Take  by mouth. inhalational spacing device 1 Each by Does Not Apply route as needed. mometasone-formoterol 200-5 mcg/actuation HFA inhaler Commonly known as:  Sofie Nick Take 2 Puffs by inhalation two (2) times a day. nortriptyline 25 mg capsule Commonly known as:  PAMELOR Take 2 Caps by mouth nightly. omeprazole 20 mg capsule Commonly known as:  PRILOSEC Take 20 mg by mouth daily. sucralfate 100 mg/mL suspension Commonly known as:  Miller Avers  
 Take 5 mL by mouth four (4) times daily. vitamin A 10,000 unit capsule Commonly known as:  AQUASOL A Take 10,000 Units by mouth daily. VITAMIN B-12 1,000 mcg tablet Generic drug:  cyanocobalamin Take 1,000 mcg by mouth daily. zolpidem 10 mg tablet Commonly known as:  AMBIEN Take 1 Tab by mouth nightly as needed for Sleep. Max Daily Amount: 10 mg.  
  
 * Notice: This list has 2 medication(s) that are the same as other medications prescribed for you. Read the directions carefully, and ask your doctor or other care provider to review them with you. Introducing [facility] & HEALTH SERVICES! Dear Edgar Strickland: Thank you for requesting a Goodfilms account. Our records indicate that you already have an active Goodfilms account. You can access your account anytime at [email or web address] Did you know that you can access your hospital and ER discharge instructions at any time in Goodfilms? You can also review all of your test results from your hospital stay or ER visit. Additional Information If you have questions, please visit the Frequently Asked Questions section of the Goodfilms website at [email or web address] Remember, Goodfilms is NOT to be used for urgent needs. For medical emergencies, dial 911. Now available from your iPhone and Android! Please provide this summary of care documentation to your next provider. Your primary care clinician is listed as Claudius Litten. If you have any questions after today's visit, please call [phone].

## 2017-12-06 NOTE — PROGRESS NOTES
Chief Complaint   Patient presents with    Anxiety         HPI:       is a 61 y.o. female. She remains disabled following her PE several months ago. Severe LBP with DDD and RLE sciatica with right SI joint pain. Unable to squat, bend or lift > 10 pounds. Experiences SOB with 10 yards ambulation. Became SOB and diaphoretic about a week ago and this lasted 2-3 days. Had skipped several doses of Eliquis. Requires the use of a cane while ascending stairs. Experiences PAF and also known to have Lupus anticoagulant. Ms Justice Franklin had a difficult weekend and experienced rage and then suicidal ideation. She is better now and is no lomnger suicidal or raging. She would like to resume her Cymbalta and is going to the drugstore today to have this filled. She is frustrated that her options are limited as she presently has no income or insurance. Please refer to the scanned document regarding her current sx. Allergies   Allergen Reactions    Pcn [Penicillins] Rash and Swelling     Other reaction(s): Rash  Other reaction(s): Rash    Gabapentin Other (comments)     Hallucinations/ sleep walking    Griseofulvin Other (comments)     Aaron-dirk syndrome    Pcn [Penicillins] Rash and Swelling    Tramadol Nausea Only and Drowsiness     Other reaction(s): Drowsiness  Interaction with Effexor  Interaction with Effexor       Current Outpatient Prescriptions   Medication Sig    venlafaxine-SR (EFFEXOR XR) 150 mg capsule Take 1 Cap by mouth daily.  esomeprazole (NEXIUM) 40 mg capsule Take 1 Cap by mouth daily.  ALPRAZolam (XANAX) 0.5 mg tablet Take 1 Tab by mouth nightly as needed for Anxiety. Max Daily Amount: 0.5 mg.    apixaban (ELIQUIS) 5 mg tablet Take 1 Tab by mouth two (2) times a day.  diphenhydrAMINE (BENADRYL) 25 mg capsule Take 25 mg by mouth every six (6) hours as needed.  calcium carbonate (TUMS) 200 mg calcium (500 mg) chew Take 3 Tabs by mouth daily.     albuterol (PROVENTIL VENTOLIN) 2.5 mg /3 mL (0.083 %) nebulizer solution by Nebulization route once.  ergocalciferol (ERGOCALCIFEROL) 50,000 unit capsule Take 1 Cap by mouth every seven (7) days.  mometasone-formoterol (DULERA) 200-5 mcg/actuation HFA inhaler Take 2 Puffs by inhalation two (2) times a day.  albuterol (PROAIR HFA) 90 mcg/actuation inhaler Take 2 Puffs by inhalation every four (4) hours as needed for Wheezing.  sucralfate (CARAFATE) 100 mg/mL suspension Take 5 mL by mouth four (4) times daily.  cyclobenzaprine (FLEXERIL) 10 mg tablet Take 1 Tab by mouth three (3) times daily as needed for Muscle Spasm(s).  albuterol-ipratropium (DUO-NEB) 2.5 mg-0.5 mg/3 ml nebulizer solution 3 mL by Nebulization route every four (4) hours. Indications: CHRONIC OBSTRUCTIVE PULMONARY DISEASE WITH BRONCHOSPASMS    inhalational spacing device 1 Each by Does Not Apply route as needed.  nortriptyline (PAMELOR) 25 mg capsule Take 2 Caps by mouth nightly.  zolpidem (AMBIEN) 10 mg tablet Take 1 Tab by mouth nightly as needed for Sleep. Max Daily Amount: 10 mg.    vitamin A (AQUASOL A) 10,000 unit capsule Take 10,000 Units by mouth daily.  cyanocobalamin (VITAMIN B-12) 1,000 mcg tablet Take 1,000 mcg by mouth daily.  ferrous sulfate (SLOW FE) 142 mg (45 mg iron) ER tablet Take 45 mg by mouth Daily (before breakfast). No current facility-administered medications for this visit. Past Medical History:   Diagnosis Date    Anemia 3/15/2013    Arrhythmia     paroxsimal afib    Asthma 3/15/2013    Asthma     Back disorder     disc problem    Chronic obstructive pulmonary disease (HCC)     Morbid obesity ([address] 3/15/2013         ROS:  Denies fever, chills, cough, chest pain, SOB,  nausea, vomiting, or diarrhea. Denies wt loss, wt gain, hemoptysis, hematochezia or melena.     Physical Examination:    /80 (BP 1 Location: Left arm, BP Patient Position: Sitting)  Pulse 84  Resp 16 Ht 5' 6\" (1.676 m)  Wt 248 lb (112.5 kg)  SpO2 98%  BMI 40.03 kg/m2    General: Alert and Ox3, Fluent speech  HEENT:  NC/AT, EOMI, OP: clear  Neck:  Supple, no adenopathy, JVD, mass or bruit  Chest:  Clear to Ausculation, without wheezes, rales, rubs or ronchi  Cardiac: RRR  Abdomen:  +BS, soft, nontender without palpable HSM  Extremities:  No cyanosis, clubbing or edema  Neurologic:  Ambulatory without assist, CN 2-12 grossly intact. Moves all extremities. Skin: no rash  Lymphadenopathy: no cervical or supraclavicular nodes      ASSESSMENT AND PLAN:     I advised her to adopt the following plan:     1.  continue Cymbalta today  2. Set up appt at the [facility]  3. Contact the [facility] [place] of  for mental health referral information   4. She will need Rheumatology consultation when available at the [facility]  5.  If she becomes suicidal or raging again, she agrees to go to the ER at [facility] for elective admission. 6.  She will need plain films and a bone scan, labs when she is able to be seen at the [facility] or if she is granted a Care Card. No orders of the defined types were placed in this encounter.       Kenn Mix MD, [place]

## 2017-12-13 ENCOUNTER — DOCUMENTATION ONLY (OUTPATIENT)
Dept: FAMILY MEDICINE CLINIC | Age: 57
End: 2017-12-13

## 2017-12-13 NOTE — PROGRESS NOTES
Pt called and asked if I would fax her dos 12-6-17 office note to her disability ,claim numb [id number]  Bisi Sandi GARZA-[phone]

## 2018-02-27 ENCOUNTER — TELEPHONE (OUTPATIENT)
Dept: FAMILY MEDICINE CLINIC | Age: 58
End: 2018-02-27

## 2018-02-27 DIAGNOSIS — F41.9 ANXIETY: ICD-10-CM

## 2018-02-27 DIAGNOSIS — F51.01 PRIMARY INSOMNIA: ICD-10-CM

## 2018-02-27 DIAGNOSIS — F51.01 PRIMARY INSOMNIA: Primary | ICD-10-CM

## 2018-02-27 RX ORDER — ZOLPIDEM TARTRATE 10 MG/1
10 TABLET ORAL
Qty: 30 TAB | Refills: 5 | Status: SHIPPED | OUTPATIENT
Start: 2018-02-27

## 2018-02-27 RX ORDER — ALPRAZOLAM 0.5 MG/1
0.5 TABLET ORAL
Qty: 30 TAB | Refills: 2 | Status: SHIPPED | OUTPATIENT
Start: 2018-02-27

## 2018-02-27 RX ORDER — ALPRAZOLAM 0.5 MG/1
0.5 TABLET ORAL
Qty: 30 TAB | Refills: 2 | Status: SHIPPED | OUTPATIENT
Start: 2018-02-27 | End: 2018-02-27 | Stop reason: SDUPTHER

## 2018-02-27 NOTE — TELEPHONE ENCOUNTER
----- Message from Marisela Solis sent at 2/27/2018 12:30 PM EST -----  Regarding: Dr. Leslie Morales  Pt needs refills of Ambien and Xanax, states she has been waiting to see Dr. Rock Cornejo at the [facility] for over a month\" and is completely out of both Rx. Please call to advise when she can pick these meds up at [phone].

## 2021-01-12 ENCOUNTER — TRANSCRIBE ORDER (OUTPATIENT)
Dept: SCHEDULING | Age: 61
End: 2021-01-12

## 2021-01-12 DIAGNOSIS — Z12.31 SCREENING MAMMOGRAM FOR HIGH-RISK PATIENT: Primary | ICD-10-CM

## 2021-06-11 ENCOUNTER — HOSPITAL ENCOUNTER (OUTPATIENT)
Dept: LAB | Age: 61
Discharge: HOME | End: 2021-06-11

## 2021-06-11 LAB
ALBUMIN SERPL-MCNC: 3.7 G/DL (ref 3.5–5)
ALBUMIN/GLOB SERPL: 1.2 {RATIO} (ref 1.1–2.2)
ALP SERPL-CCNC: 76 U/L (ref 45–117)
ALT SERPL-CCNC: 21 U/L (ref 12–78)
ANION GAP SERPL CALC-SCNC: 10 MMOL/L (ref 5–15)
AST SERPL-CCNC: 13 U/L (ref 15–37)
BASOPHILS # BLD: 0.1 K/UL (ref 0–0.1)
BASOPHILS NFR BLD: 1 % (ref 0–1)
BILIRUB SERPL-MCNC: 0.4 MG/DL (ref 0.2–1)
BUN SERPL-MCNC: 13 MG/DL (ref 6–20)
BUN/CREAT SERPL: 16 (ref 12–20)
CALCIUM SERPL-MCNC: 8.6 MG/DL (ref 8.5–10.1)
CHLORIDE SERPL-SCNC: 102 MMOL/L (ref 97–108)
CHOLEST SERPL-MCNC: 240 MG/DL
CO2 SERPL-SCNC: 31 MMOL/L (ref 21–32)
CREAT SERPL-MCNC: 0.79 MG/DL (ref 0.55–1.02)
DIFFERENTIAL METHOD BLD: ABNORMAL
EOSINOPHIL # BLD: 0.6 K/UL (ref 0–0.4)
EOSINOPHIL NFR BLD: 5 % (ref 0–7)
ERYTHROCYTE [DISTWIDTH] IN BLOOD BY AUTOMATED COUNT: 17.1 % (ref 11.5–14.5)
EST. AVERAGE GLUCOSE BLD GHB EST-MCNC: 108 MG/DL
GLOBULIN SER CALC-MCNC: 3 G/DL (ref 2–4)
GLUCOSE SERPL-MCNC: 70 MG/DL (ref 65–100)
HBA1C MFR BLD: 5.4 % (ref 4–5.6)
HCT VFR BLD AUTO: 41.2 % (ref 35–47)
HDLC SERPL-MCNC: 114 MG/DL
HDLC SERPL: 2.1 {RATIO} (ref 0–5)
HGB BLD-MCNC: 12.8 G/DL (ref 11.5–16)
IMM GRANULOCYTES # BLD AUTO: 0.1 K/UL (ref 0–0.04)
IMM GRANULOCYTES NFR BLD AUTO: 1 % (ref 0–0.5)
LDLC SERPL CALC-MCNC: 109.2 MG/DL (ref 0–100)
LYMPHOCYTES # BLD: 3.9 K/UL (ref 0.8–3.5)
LYMPHOCYTES NFR BLD: 32 % (ref 12–49)
MCH RBC QN AUTO: 26.6 PG (ref 26–34)
MCHC RBC AUTO-ENTMCNC: 31.1 G/DL (ref 30–36.5)
MCV RBC AUTO: 85.7 FL (ref 80–99)
MONOCYTES # BLD: 1 K/UL (ref 0–1)
MONOCYTES NFR BLD: 8 % (ref 5–13)
NEUTS SEG # BLD: 6.4 K/UL (ref 1.8–8)
NEUTS SEG NFR BLD: 53 % (ref 32–75)
NRBC # BLD: 0.02 K/UL (ref 0–0.01)
NRBC BLD-RTO: 0.2 PER 100 WBC
PLATELET # BLD AUTO: 392 K/UL (ref 150–400)
PMV BLD AUTO: 10.9 FL (ref 8.9–12.9)
POTASSIUM SERPL-SCNC: 4 MMOL/L (ref 3.5–5.1)
PROT SERPL-MCNC: 6.7 G/DL (ref 6.4–8.2)
RBC # BLD AUTO: 4.81 M/UL (ref 3.8–5.2)
SODIUM SERPL-SCNC: 143 MMOL/L (ref 136–145)
TRIGL SERPL-MCNC: 84 MG/DL (ref ?–150)
VLDLC SERPL CALC-MCNC: 16.8 MG/DL
WBC # BLD AUTO: 12.1 K/UL (ref 3.6–11)

## 2021-06-11 PROCEDURE — 82306 VITAMIN D 25 HYDROXY: CPT

## 2021-06-11 PROCEDURE — 80053 COMPREHEN METABOLIC PANEL: CPT

## 2021-06-11 PROCEDURE — 83036 HEMOGLOBIN GLYCOSYLATED A1C: CPT

## 2021-06-11 PROCEDURE — 85025 COMPLETE CBC W/AUTO DIFF WBC: CPT

## 2021-06-11 PROCEDURE — 80061 LIPID PANEL: CPT

## 2021-06-12 LAB — 25(OH)D3 SERPL-MCNC: 20.8 NG/ML (ref 30–100)

## 2021-06-24 ENCOUNTER — TRANSCRIBE ORDER (OUTPATIENT)
Dept: REGISTRATION | Age: 61
End: 2021-06-24

## 2021-06-24 ENCOUNTER — HOSPITAL ENCOUNTER (OUTPATIENT)
Dept: GENERAL RADIOLOGY | Age: 61
Discharge: HOME | End: 2021-06-24
Payer: MEDICARE

## 2021-06-24 DIAGNOSIS — M25.551 RIGHT HIP PAIN: Primary | ICD-10-CM

## 2021-06-24 DIAGNOSIS — M25.551 RIGHT HIP PAIN: ICD-10-CM

## 2021-06-24 PROCEDURE — 73502 X-RAY EXAM HIP UNI 2-3 VIEWS: CPT

## 2021-12-13 ENCOUNTER — HOSPITAL ENCOUNTER (OUTPATIENT)
Dept: LAB | Age: 61
Discharge: HOME | End: 2021-12-13

## 2021-12-13 PROCEDURE — 80061 LIPID PANEL: CPT

## 2021-12-13 PROCEDURE — 83036 HEMOGLOBIN GLYCOSYLATED A1C: CPT

## 2021-12-13 PROCEDURE — 84443 ASSAY THYROID STIM HORMONE: CPT

## 2021-12-13 PROCEDURE — 80053 COMPREHEN METABOLIC PANEL: CPT

## 2021-12-13 PROCEDURE — 85025 COMPLETE CBC W/AUTO DIFF WBC: CPT

## 2021-12-13 PROCEDURE — 84439 ASSAY OF FREE THYROXINE: CPT

## 2021-12-14 LAB
ALBUMIN SERPL-MCNC: 4 G/DL (ref 3.5–5)
ALBUMIN/GLOB SERPL: 1.2 {RATIO} (ref 1.1–2.2)
ALP SERPL-CCNC: 94 U/L (ref 45–117)
ALT SERPL-CCNC: 21 U/L (ref 12–78)
ANION GAP SERPL CALC-SCNC: 11 MMOL/L (ref 5–15)
AST SERPL-CCNC: 16 U/L (ref 15–37)
BASOPHILS # BLD: 0.1 K/UL (ref 0–0.1)
BASOPHILS NFR BLD: 1 % (ref 0–1)
BILIRUB SERPL-MCNC: 0.4 MG/DL (ref 0.2–1)
BUN SERPL-MCNC: 9 MG/DL (ref 6–20)
BUN/CREAT SERPL: 10 (ref 12–20)
CALCIUM SERPL-MCNC: 9.3 MG/DL (ref 8.5–10.1)
CHLORIDE SERPL-SCNC: 103 MMOL/L (ref 97–108)
CHOLEST SERPL-MCNC: 236 MG/DL
CO2 SERPL-SCNC: 27 MMOL/L (ref 21–32)
CREAT SERPL-MCNC: 0.91 MG/DL (ref 0.55–1.02)
DIFFERENTIAL METHOD BLD: ABNORMAL
EOSINOPHIL # BLD: 0.5 K/UL (ref 0–0.4)
EOSINOPHIL NFR BLD: 7 % (ref 0–7)
ERYTHROCYTE [DISTWIDTH] IN BLOOD BY AUTOMATED COUNT: 16.9 % (ref 11.5–14.5)
EST. AVERAGE GLUCOSE BLD GHB EST-MCNC: 108 MG/DL
GLOBULIN SER CALC-MCNC: 3.3 G/DL (ref 2–4)
GLUCOSE SERPL-MCNC: 75 MG/DL (ref 65–100)
HBA1C MFR BLD: 5.4 % (ref 4–5.6)
HCT VFR BLD AUTO: 46.4 % (ref 35–47)
HDLC SERPL-MCNC: 100 MG/DL
HDLC SERPL: 2.4 {RATIO} (ref 0–5)
HGB BLD-MCNC: 13.7 G/DL (ref 11.5–16)
IMM GRANULOCYTES # BLD AUTO: 0 K/UL (ref 0–0.04)
IMM GRANULOCYTES NFR BLD AUTO: 0 % (ref 0–0.5)
LDLC SERPL CALC-MCNC: 119.8 MG/DL (ref 0–100)
LYMPHOCYTES # BLD: 1.8 K/UL (ref 0.8–3.5)
LYMPHOCYTES NFR BLD: 24 % (ref 12–49)
MCH RBC QN AUTO: 27.1 PG (ref 26–34)
MCHC RBC AUTO-ENTMCNC: 29.5 G/DL (ref 30–36.5)
MCV RBC AUTO: 91.9 FL (ref 80–99)
MONOCYTES # BLD: 0.6 K/UL (ref 0–1)
MONOCYTES NFR BLD: 8 % (ref 5–13)
NEUTS SEG # BLD: 4.4 K/UL (ref 1.8–8)
NEUTS SEG NFR BLD: 60 % (ref 32–75)
NRBC # BLD: 0 K/UL (ref 0–0.01)
NRBC BLD-RTO: 0 PER 100 WBC
PLATELET # BLD AUTO: 354 K/UL (ref 150–400)
PMV BLD AUTO: 11.7 FL (ref 8.9–12.9)
POTASSIUM SERPL-SCNC: 4.4 MMOL/L (ref 3.5–5.1)
PROT SERPL-MCNC: 7.3 G/DL (ref 6.4–8.2)
RBC # BLD AUTO: 5.05 M/UL (ref 3.8–5.2)
SODIUM SERPL-SCNC: 141 MMOL/L (ref 136–145)
T4 FREE SERPL-MCNC: 1.1 NG/DL (ref 0.8–1.5)
TRIGL SERPL-MCNC: 81 MG/DL (ref ?–150)
TSH SERPL DL<=0.05 MIU/L-ACNC: 1.46 UIU/ML (ref 0.36–3.74)
VLDLC SERPL CALC-MCNC: 16.2 MG/DL
WBC # BLD AUTO: 7.4 K/UL (ref 3.6–11)

## 2022-03-11 ENCOUNTER — HOSPITAL ENCOUNTER (OUTPATIENT)
Dept: MAMMOGRAPHY | Age: 62
Discharge: HOME | End: 2022-03-11
Attending: FAMILY MEDICINE
Payer: MEDICARE

## 2022-03-11 VITALS — BODY MASS INDEX: 40.35 KG/M2 | WEIGHT: 250 LBS

## 2022-03-11 DIAGNOSIS — Z12.31 VISIT FOR SCREENING MAMMOGRAM: ICD-10-CM

## 2022-03-11 PROCEDURE — 77063 BREAST TOMOSYNTHESIS BI: CPT

## 2022-03-19 PROBLEM — I26.99 ACUTE PULMONARY EMBOLISM (HCC): Status: ACTIVE | Noted: 2017-03-04

## 2022-03-19 PROBLEM — I26.99 PULMONARY EMBOLISM (HCC): Status: ACTIVE | Noted: 2017-08-04

## 2022-03-20 PROBLEM — M79.89 PAIN AND SWELLING OF RIGHT LOWER LEG: Status: ACTIVE | Noted: 2017-03-23

## 2022-03-20 PROBLEM — M79.661 PAIN AND SWELLING OF RIGHT LOWER LEG: Status: ACTIVE | Noted: 2017-03-23

## 2022-03-20 PROBLEM — R79.89 TROPONIN I ABOVE REFERENCE RANGE: Status: ACTIVE | Noted: 2017-03-04

## 2022-03-20 PROBLEM — R77.8 TROPONIN I ABOVE REFERENCE RANGE: Status: ACTIVE | Noted: 2017-03-04

## 2022-05-06 ENCOUNTER — HOSPITAL ENCOUNTER (OUTPATIENT)
Dept: LAB | Age: 62
Discharge: HOME | End: 2022-05-06

## 2022-05-06 PROCEDURE — 80061 LIPID PANEL: CPT

## 2022-05-06 PROCEDURE — 82306 VITAMIN D 25 HYDROXY: CPT

## 2022-05-06 PROCEDURE — 80053 COMPREHEN METABOLIC PANEL: CPT

## 2022-05-06 PROCEDURE — 85025 COMPLETE CBC W/AUTO DIFF WBC: CPT

## 2022-05-06 PROCEDURE — 83036 HEMOGLOBIN GLYCOSYLATED A1C: CPT

## 2022-05-07 LAB
25(OH)D3 SERPL-MCNC: 56.3 NG/ML (ref 30–100)
ALBUMIN SERPL-MCNC: 4 G/DL (ref 3.5–5)
ALBUMIN/GLOB SERPL: 1.4 {RATIO} (ref 1.1–2.2)
ALP SERPL-CCNC: 113 U/L (ref 45–117)
ALT SERPL-CCNC: 44 U/L (ref 12–78)
ANION GAP SERPL CALC-SCNC: 10 MMOL/L (ref 5–15)
AST SERPL-CCNC: 35 U/L (ref 15–37)
BASOPHILS # BLD: 0.1 K/UL (ref 0–0.1)
BASOPHILS NFR BLD: 2 % (ref 0–1)
BILIRUB SERPL-MCNC: 0.6 MG/DL (ref 0.2–1)
BUN SERPL-MCNC: 11 MG/DL (ref 6–20)
BUN/CREAT SERPL: 14 (ref 12–20)
CALCIUM SERPL-MCNC: 8.8 MG/DL (ref 8.5–10.1)
CHLORIDE SERPL-SCNC: 104 MMOL/L (ref 97–108)
CHOLEST SERPL-MCNC: 174 MG/DL
CO2 SERPL-SCNC: 29 MMOL/L (ref 21–32)
CREAT SERPL-MCNC: 0.81 MG/DL (ref 0.55–1.02)
DIFFERENTIAL METHOD BLD: ABNORMAL
EOSINOPHIL # BLD: 0.7 K/UL (ref 0–0.4)
EOSINOPHIL NFR BLD: 9 % (ref 0–7)
ERYTHROCYTE [DISTWIDTH] IN BLOOD BY AUTOMATED COUNT: 16.5 % (ref 11.5–14.5)
EST. AVERAGE GLUCOSE BLD GHB EST-MCNC: 114 MG/DL
GLOBULIN SER CALC-MCNC: 2.8 G/DL (ref 2–4)
GLUCOSE SERPL-MCNC: 81 MG/DL (ref 65–100)
HBA1C MFR BLD: 5.6 % (ref 4–5.6)
HCT VFR BLD AUTO: 44.3 % (ref 35–47)
HDLC SERPL-MCNC: 82 MG/DL
HDLC SERPL: 2.1 {RATIO} (ref 0–5)
HGB BLD-MCNC: 13.8 G/DL (ref 11.5–16)
IMM GRANULOCYTES # BLD AUTO: 0 K/UL (ref 0–0.04)
IMM GRANULOCYTES NFR BLD AUTO: 0 % (ref 0–0.5)
LDLC SERPL CALC-MCNC: 80.8 MG/DL (ref 0–100)
LYMPHOCYTES # BLD: 2.2 K/UL (ref 0.8–3.5)
LYMPHOCYTES NFR BLD: 29 % (ref 12–49)
MCH RBC QN AUTO: 27 PG (ref 26–34)
MCHC RBC AUTO-ENTMCNC: 31.2 G/DL (ref 30–36.5)
MCV RBC AUTO: 86.7 FL (ref 80–99)
MONOCYTES # BLD: 0.6 K/UL (ref 0–1)
MONOCYTES NFR BLD: 8 % (ref 5–13)
NEUTS SEG # BLD: 4 K/UL (ref 1.8–8)
NEUTS SEG NFR BLD: 52 % (ref 32–75)
NRBC # BLD: 0 K/UL (ref 0–0.01)
NRBC BLD-RTO: 0 PER 100 WBC
PLATELET # BLD AUTO: 317 K/UL (ref 150–400)
PMV BLD AUTO: 11.3 FL (ref 8.9–12.9)
POTASSIUM SERPL-SCNC: 5.4 MMOL/L (ref 3.5–5.1)
PROT SERPL-MCNC: 6.8 G/DL (ref 6.4–8.2)
RBC # BLD AUTO: 5.11 M/UL (ref 3.8–5.2)
SODIUM SERPL-SCNC: 143 MMOL/L (ref 136–145)
TRIGL SERPL-MCNC: 56 MG/DL (ref ?–150)
VLDLC SERPL CALC-MCNC: 11.2 MG/DL
WBC # BLD AUTO: 7.6 K/UL (ref 3.6–11)

## 2022-07-05 ENCOUNTER — HOSPITAL ENCOUNTER (OUTPATIENT)
Dept: LAB | Age: 62
Discharge: HOME | End: 2022-07-05

## 2022-07-05 PROCEDURE — 87086 URINE CULTURE/COLONY COUNT: CPT

## 2022-07-05 PROCEDURE — 87186 SC STD MICRODIL/AGAR DIL: CPT

## 2022-07-05 PROCEDURE — 87077 CULTURE AEROBIC IDENTIFY: CPT

## 2022-07-08 LAB
BACTERIA SPEC CULT: ABNORMAL
CC UR VC: ABNORMAL
SERVICE CMNT-IMP: ABNORMAL

## 2023-01-03 ENCOUNTER — HOSPITAL ENCOUNTER (OUTPATIENT)
Dept: LAB | Age: 63
Discharge: HOME | End: 2023-01-03

## 2023-01-03 LAB
ALBUMIN SERPL-MCNC: 4 G/DL (ref 3.5–5)
ALBUMIN/GLOB SERPL: 1.4 {RATIO} (ref 1.1–2.2)
ALP SERPL-CCNC: 74 U/L (ref 45–117)
ALT SERPL-CCNC: 28 U/L (ref 12–78)
ANION GAP SERPL CALC-SCNC: 7 MMOL/L (ref 5–15)
AST SERPL-CCNC: 25 U/L (ref 15–37)
BASOPHILS # BLD: 0.1 K/UL (ref 0–0.1)
BASOPHILS NFR BLD: 1 % (ref 0–1)
BILIRUB SERPL-MCNC: 0.3 MG/DL (ref 0.2–1)
BUN SERPL-MCNC: 15 MG/DL (ref 6–20)
BUN/CREAT SERPL: 17 (ref 12–20)
CALCIUM SERPL-MCNC: 9 MG/DL (ref 8.5–10.1)
CHLORIDE SERPL-SCNC: 104 MMOL/L (ref 97–108)
CHOLEST SERPL-MCNC: 215 MG/DL
CO2 SERPL-SCNC: 31 MMOL/L (ref 21–32)
CREAT SERPL-MCNC: 0.87 MG/DL (ref 0.55–1.02)
DIFFERENTIAL METHOD BLD: ABNORMAL
EOSINOPHIL # BLD: 0.7 K/UL (ref 0–0.4)
EOSINOPHIL NFR BLD: 11 % (ref 0–7)
ERYTHROCYTE [DISTWIDTH] IN BLOOD BY AUTOMATED COUNT: 15.2 % (ref 11.5–14.5)
EST. AVERAGE GLUCOSE BLD GHB EST-MCNC: 114 MG/DL
GLOBULIN SER CALC-MCNC: 2.9 G/DL (ref 2–4)
GLUCOSE SERPL-MCNC: 87 MG/DL (ref 65–100)
HBA1C MFR BLD: 5.6 % (ref 4–5.6)
HCT VFR BLD AUTO: 41.4 % (ref 35–47)
HDLC SERPL-MCNC: 94 MG/DL
HDLC SERPL: 2.3 {RATIO} (ref 0–5)
HGB BLD-MCNC: 13.1 G/DL (ref 11.5–16)
IMM GRANULOCYTES # BLD AUTO: 0 K/UL (ref 0–0.04)
IMM GRANULOCYTES NFR BLD AUTO: 0 % (ref 0–0.5)
LDLC SERPL CALC-MCNC: 106.6 MG/DL (ref 0–100)
LYMPHOCYTES # BLD: 1.8 K/UL (ref 0.8–3.5)
LYMPHOCYTES NFR BLD: 30 % (ref 12–49)
MCH RBC QN AUTO: 27.2 PG (ref 26–34)
MCHC RBC AUTO-ENTMCNC: 31.6 G/DL (ref 30–36.5)
MCV RBC AUTO: 85.9 FL (ref 80–99)
MONOCYTES # BLD: 0.4 K/UL (ref 0–1)
MONOCYTES NFR BLD: 7 % (ref 5–13)
NEUTS SEG # BLD: 2.9 K/UL (ref 1.8–8)
NEUTS SEG NFR BLD: 51 % (ref 32–75)
NRBC # BLD: 0 K/UL (ref 0–0.01)
NRBC BLD-RTO: 0 PER 100 WBC
PLATELET # BLD AUTO: 344 K/UL (ref 150–400)
PMV BLD AUTO: 11.6 FL (ref 8.9–12.9)
POTASSIUM SERPL-SCNC: 4.7 MMOL/L (ref 3.5–5.1)
PROT SERPL-MCNC: 6.9 G/DL (ref 6.4–8.2)
RBC # BLD AUTO: 4.82 M/UL (ref 3.8–5.2)
SODIUM SERPL-SCNC: 142 MMOL/L (ref 136–145)
TRIGL SERPL-MCNC: 72 MG/DL (ref ?–150)
VLDLC SERPL CALC-MCNC: 14.4 MG/DL
WBC # BLD AUTO: 5.9 K/UL (ref 3.6–11)

## 2023-01-03 PROCEDURE — 80061 LIPID PANEL: CPT

## 2023-01-03 PROCEDURE — 82306 VITAMIN D 25 HYDROXY: CPT

## 2023-01-03 PROCEDURE — 83036 HEMOGLOBIN GLYCOSYLATED A1C: CPT

## 2023-01-03 PROCEDURE — 85025 COMPLETE CBC W/AUTO DIFF WBC: CPT

## 2023-01-03 PROCEDURE — 80053 COMPREHEN METABOLIC PANEL: CPT

## 2023-01-04 LAB — 25(OH)D3 SERPL-MCNC: 32.8 NG/ML (ref 30–100)

## 2023-11-06 ENCOUNTER — HOSPITAL ENCOUNTER (OUTPATIENT)
Facility: HOSPITAL | Age: 63
Setting detail: SPECIMEN
Discharge: HOME | End: 2023-11-09

## 2023-11-06 LAB
ALBUMIN SERPL-MCNC: 3.9 G/DL (ref 3.5–5)
ALBUMIN/GLOB SERPL: 1.3 (ref 1.1–2.2)
ALP SERPL-CCNC: 88 U/L (ref 45–117)
ALT SERPL-CCNC: 21 U/L (ref 12–78)
ANION GAP SERPL CALC-SCNC: 7 MMOL/L (ref 5–15)
AST SERPL-CCNC: 15 U/L (ref 15–37)
BASOPHILS # BLD: 0.1 K/UL (ref 0–0.1)
BASOPHILS NFR BLD: 1 % (ref 0–1)
BILIRUB SERPL-MCNC: 0.3 MG/DL (ref 0.2–1)
BUN SERPL-MCNC: 12 MG/DL (ref 6–20)
BUN/CREAT SERPL: 13 (ref 12–20)
CALCIUM SERPL-MCNC: 9 MG/DL (ref 8.5–10.1)
CHLORIDE SERPL-SCNC: 104 MMOL/L (ref 97–108)
CHOLEST SERPL-MCNC: 243 MG/DL
CO2 SERPL-SCNC: 30 MMOL/L (ref 21–32)
CREAT SERPL-MCNC: 0.91 MG/DL (ref 0.55–1.02)
DIFFERENTIAL METHOD BLD: ABNORMAL
EOSINOPHIL # BLD: 0.6 K/UL (ref 0–0.4)
EOSINOPHIL NFR BLD: 9 % (ref 0–7)
ERYTHROCYTE [DISTWIDTH] IN BLOOD BY AUTOMATED COUNT: 14.9 % (ref 11.5–14.5)
EST. AVERAGE GLUCOSE BLD GHB EST-MCNC: 105 MG/DL
GLOBULIN SER CALC-MCNC: 3 G/DL (ref 2–4)
GLUCOSE SERPL-MCNC: 83 MG/DL (ref 65–100)
HBA1C MFR BLD: 5.3 % (ref 4–5.6)
HCT VFR BLD AUTO: 44.5 % (ref 35–47)
HDLC SERPL-MCNC: 76 MG/DL
HDLC SERPL: 3.2 (ref 0–5)
HGB BLD-MCNC: 13.5 G/DL (ref 11.5–16)
IMM GRANULOCYTES # BLD AUTO: 0 K/UL (ref 0–0.04)
IMM GRANULOCYTES NFR BLD AUTO: 0 % (ref 0–0.5)
LDLC SERPL CALC-MCNC: 153 MG/DL (ref 0–100)
LYMPHOCYTES # BLD: 2.1 K/UL (ref 0.8–3.5)
LYMPHOCYTES NFR BLD: 32 % (ref 12–49)
MCH RBC QN AUTO: 26.8 PG (ref 26–34)
MCHC RBC AUTO-ENTMCNC: 30.3 G/DL (ref 30–36.5)
MCV RBC AUTO: 88.3 FL (ref 80–99)
MONOCYTES # BLD: 0.5 K/UL (ref 0–1)
MONOCYTES NFR BLD: 7 % (ref 5–13)
NEUTS SEG # BLD: 3.3 K/UL (ref 1.8–8)
NEUTS SEG NFR BLD: 51 % (ref 32–75)
NRBC # BLD: 0 K/UL (ref 0–0.01)
NRBC BLD-RTO: 0 PER 100 WBC
PLATELET # BLD AUTO: 317 K/UL (ref 150–400)
PMV BLD AUTO: 11.9 FL (ref 8.9–12.9)
POTASSIUM SERPL-SCNC: 3.9 MMOL/L (ref 3.5–5.1)
PROT SERPL-MCNC: 6.9 G/DL (ref 6.4–8.2)
RBC # BLD AUTO: 5.04 M/UL (ref 3.8–5.2)
SODIUM SERPL-SCNC: 141 MMOL/L (ref 136–145)
TRIGL SERPL-MCNC: 70 MG/DL
VLDLC SERPL CALC-MCNC: 14 MG/DL
WBC # BLD AUTO: 6.5 K/UL (ref 3.6–11)

## 2023-11-06 PROCEDURE — 80053 COMPREHEN METABOLIC PANEL: CPT

## 2023-11-06 PROCEDURE — 80061 LIPID PANEL: CPT

## 2023-11-06 PROCEDURE — 83036 HEMOGLOBIN GLYCOSYLATED A1C: CPT

## 2023-11-06 PROCEDURE — 85025 COMPLETE CBC W/AUTO DIFF WBC: CPT

## 2023-11-08 ENCOUNTER — TRANSCRIBE ORDERS (OUTPATIENT)
Facility: HOSPITAL | Age: 63
End: 2023-11-08

## 2023-11-08 DIAGNOSIS — Z12.31 SCREENING MAMMOGRAM FOR BREAST CANCER: Primary | ICD-10-CM

## 2023-12-28 ENCOUNTER — APPOINTMENT (OUTPATIENT)
Facility: HOSPITAL | Age: 63
DRG: 372 | End: 2023-12-28
Payer: MEDICARE

## 2023-12-28 ENCOUNTER — HOSPITAL ENCOUNTER (INPATIENT)
Facility: HOSPITAL | Age: 63
LOS: 6 days | Discharge: HOME | DRG: 372 | End: 2024-01-04
Attending: STUDENT IN AN ORGANIZED HEALTH CARE EDUCATION/TRAINING PROGRAM | Admitting: INTERNAL MEDICINE
Payer: MEDICARE

## 2023-12-28 DIAGNOSIS — K85.90 ACUTE PANCREATITIS WITHOUT INFECTION OR NECROSIS, UNSPECIFIED PANCREATITIS TYPE: Primary | ICD-10-CM

## 2023-12-28 LAB
ALBUMIN SERPL-MCNC: 3.4 G/DL (ref 3.5–5)
ALBUMIN/GLOB SERPL: 1 (ref 1.1–2.2)
ALP SERPL-CCNC: 78 U/L (ref 45–117)
ALT SERPL-CCNC: 29 U/L (ref 12–78)
ANION GAP SERPL CALC-SCNC: 3 MMOL/L (ref 5–15)
APPEARANCE UR: CLEAR
AST SERPL-CCNC: 24 U/L (ref 15–37)
BACTERIA URNS QL MICRO: NEGATIVE /HPF
BASOPHILS # BLD: 0.1 K/UL (ref 0–0.1)
BASOPHILS NFR BLD: 1 % (ref 0–1)
BILIRUB SERPL-MCNC: 0.4 MG/DL (ref 0.2–1)
BILIRUB UR QL: NEGATIVE
BUN SERPL-MCNC: 10 MG/DL (ref 6–20)
BUN/CREAT SERPL: 10 (ref 12–20)
CALCIUM SERPL-MCNC: 9.7 MG/DL (ref 8.5–10.1)
CHLORIDE SERPL-SCNC: 108 MMOL/L (ref 97–108)
CO2 SERPL-SCNC: 29 MMOL/L (ref 21–32)
COLOR UR: ABNORMAL
CREAT SERPL-MCNC: 0.96 MG/DL (ref 0.55–1.02)
DIFFERENTIAL METHOD BLD: ABNORMAL
EOSINOPHIL # BLD: 0.2 K/UL (ref 0–0.4)
EOSINOPHIL NFR BLD: 1 % (ref 0–7)
EPITH CASTS URNS QL MICRO: ABNORMAL /LPF
ERYTHROCYTE [DISTWIDTH] IN BLOOD BY AUTOMATED COUNT: 15 % (ref 11.5–14.5)
GLOBULIN SER CALC-MCNC: 3.3 G/DL (ref 2–4)
GLUCOSE SERPL-MCNC: 173 MG/DL (ref 65–100)
GLUCOSE UR STRIP.AUTO-MCNC: NEGATIVE MG/DL
HCT VFR BLD AUTO: 39.4 % (ref 35–47)
HGB BLD-MCNC: 12.3 G/DL (ref 11.5–16)
HGB UR QL STRIP: NEGATIVE
IMM GRANULOCYTES # BLD AUTO: 0.2 K/UL (ref 0–0.04)
IMM GRANULOCYTES NFR BLD AUTO: 1 % (ref 0–0.5)
KETONES UR QL STRIP.AUTO: 15 MG/DL
LEUKOCYTE ESTERASE UR QL STRIP.AUTO: NEGATIVE
LIPASE SERPL-CCNC: 59 U/L (ref 13–75)
LYMPHOCYTES # BLD: 1.9 K/UL (ref 0.8–3.5)
LYMPHOCYTES NFR BLD: 11 % (ref 12–49)
MCH RBC QN AUTO: 27.6 PG (ref 26–34)
MCHC RBC AUTO-ENTMCNC: 31.2 G/DL (ref 30–36.5)
MCV RBC AUTO: 88.3 FL (ref 80–99)
MONOCYTES # BLD: 1.2 K/UL (ref 0–1)
MONOCYTES NFR BLD: 7 % (ref 5–13)
NEUTS SEG # BLD: 13.6 K/UL (ref 1.8–8)
NEUTS SEG NFR BLD: 79 % (ref 32–75)
NITRITE UR QL STRIP.AUTO: NEGATIVE
NRBC # BLD: 0 K/UL (ref 0–0.01)
NRBC BLD-RTO: 0 PER 100 WBC
PH UR STRIP: 5.5 (ref 5–8)
PLATELET # BLD AUTO: 357 K/UL (ref 150–400)
PMV BLD AUTO: 10.7 FL (ref 8.9–12.9)
POTASSIUM SERPL-SCNC: 3.7 MMOL/L (ref 3.5–5.1)
PROT SERPL-MCNC: 6.7 G/DL (ref 6.4–8.2)
PROT UR STRIP-MCNC: 30 MG/DL
RBC # BLD AUTO: 4.46 M/UL (ref 3.8–5.2)
RBC #/AREA URNS HPF: ABNORMAL /HPF (ref 0–5)
SODIUM SERPL-SCNC: 140 MMOL/L (ref 136–145)
SP GR UR REFRACTOMETRY: 1.01 (ref 1–1.03)
URINE CULTURE IF INDICATED: ABNORMAL
UROBILINOGEN UR QL STRIP.AUTO: 1 EU/DL (ref 0.2–1)
WBC # BLD AUTO: 17.1 K/UL (ref 3.6–11)
WBC URNS QL MICRO: ABNORMAL /HPF (ref 0–4)

## 2023-12-28 PROCEDURE — 6360000002 HC RX W HCPCS: Performed by: STUDENT IN AN ORGANIZED HEALTH CARE EDUCATION/TRAINING PROGRAM

## 2023-12-28 PROCEDURE — 96375 TX/PRO/DX INJ NEW DRUG ADDON: CPT

## 2023-12-28 PROCEDURE — 81001 URINALYSIS AUTO W/SCOPE: CPT

## 2023-12-28 PROCEDURE — 6360000004 HC RX CONTRAST MEDICATION: Performed by: RADIOLOGY

## 2023-12-28 PROCEDURE — 2580000003 HC RX 258: Performed by: STUDENT IN AN ORGANIZED HEALTH CARE EDUCATION/TRAINING PROGRAM

## 2023-12-28 PROCEDURE — 74177 CT ABD & PELVIS W/CONTRAST: CPT

## 2023-12-28 PROCEDURE — 85025 COMPLETE CBC W/AUTO DIFF WBC: CPT

## 2023-12-28 PROCEDURE — 80053 COMPREHEN METABOLIC PANEL: CPT

## 2023-12-28 PROCEDURE — 96374 THER/PROPH/DIAG INJ IV PUSH: CPT

## 2023-12-28 PROCEDURE — 36415 COLL VENOUS BLD VENIPUNCTURE: CPT

## 2023-12-28 PROCEDURE — 99285 EMERGENCY DEPT VISIT HI MDM: CPT

## 2023-12-28 PROCEDURE — 83690 ASSAY OF LIPASE: CPT

## 2023-12-28 RX ORDER — MORPHINE SULFATE 4 MG/ML
4 INJECTION, SOLUTION INTRAMUSCULAR; INTRAVENOUS
Status: COMPLETED | OUTPATIENT
Start: 2023-12-28 | End: 2023-12-28

## 2023-12-28 RX ORDER — ONDANSETRON 2 MG/ML
4 INJECTION INTRAMUSCULAR; INTRAVENOUS ONCE
Status: COMPLETED | OUTPATIENT
Start: 2023-12-28 | End: 2023-12-28

## 2023-12-28 RX ORDER — 0.9 % SODIUM CHLORIDE 0.9 %
1000 INTRAVENOUS SOLUTION INTRAVENOUS ONCE
Status: COMPLETED | OUTPATIENT
Start: 2023-12-28 | End: 2023-12-28

## 2023-12-28 RX ADMIN — SODIUM CHLORIDE 1000 ML: 9 INJECTION, SOLUTION INTRAVENOUS at 21:36

## 2023-12-28 RX ADMIN — MORPHINE SULFATE 4 MG: 4 INJECTION, SOLUTION INTRAMUSCULAR; INTRAVENOUS at 21:36

## 2023-12-28 RX ADMIN — IOPAMIDOL 100 ML: 755 INJECTION, SOLUTION INTRAVENOUS at 21:42

## 2023-12-28 RX ADMIN — ONDANSETRON 4 MG: 2 INJECTION INTRAMUSCULAR; INTRAVENOUS at 21:36

## 2023-12-28 ASSESSMENT — PAIN - FUNCTIONAL ASSESSMENT: PAIN_FUNCTIONAL_ASSESSMENT: 0-10

## 2023-12-28 ASSESSMENT — PAIN DESCRIPTION - LOCATION: LOCATION: ABDOMEN

## 2023-12-28 ASSESSMENT — PAIN SCALES - GENERAL: PAINLEVEL_OUTOF10: 8

## 2023-12-29 PROBLEM — K85.90 ACUTE PANCREATITIS, UNSPECIFIED COMPLICATION STATUS, UNSPECIFIED PANCREATITIS TYPE: Status: ACTIVE | Noted: 2023-12-29

## 2023-12-29 LAB
AMYLASE SERPL-CCNC: 16 U/L (ref 25–115)
ANION GAP SERPL CALC-SCNC: 8 MMOL/L (ref 5–15)
BASOPHILS # BLD: 0.1 K/UL (ref 0–0.1)
BASOPHILS NFR BLD: 0 % (ref 0–1)
BUN SERPL-MCNC: 13 MG/DL (ref 6–20)
BUN/CREAT SERPL: 19 (ref 12–20)
CALCIUM SERPL-MCNC: 9.3 MG/DL (ref 8.5–10.1)
CHLORIDE SERPL-SCNC: 110 MMOL/L (ref 97–108)
CHOLEST SERPL-MCNC: 141 MG/DL
CHOLEST SERPL-MCNC: 164 MG/DL
CO2 SERPL-SCNC: 26 MMOL/L (ref 21–32)
CREAT SERPL-MCNC: 0.68 MG/DL (ref 0.55–1.02)
DIFFERENTIAL METHOD BLD: ABNORMAL
EOSINOPHIL # BLD: 0.1 K/UL (ref 0–0.4)
EOSINOPHIL NFR BLD: 1 % (ref 0–7)
ERYTHROCYTE [DISTWIDTH] IN BLOOD BY AUTOMATED COUNT: 14.9 % (ref 11.5–14.5)
GLUCOSE SERPL-MCNC: 118 MG/DL (ref 65–100)
HCT VFR BLD AUTO: 29.5 % (ref 35–47)
HCT VFR BLD AUTO: 31 % (ref 35–47)
HDLC SERPL-MCNC: 58 MG/DL
HDLC SERPL-MCNC: 65 MG/DL
HDLC SERPL: 2.4 (ref 0–5)
HDLC SERPL: 2.5 (ref 0–5)
HGB BLD-MCNC: 9.3 G/DL (ref 11.5–16)
HGB BLD-MCNC: 9.9 G/DL (ref 11.5–16)
IMM GRANULOCYTES # BLD AUTO: 0.1 K/UL (ref 0–0.04)
IMM GRANULOCYTES NFR BLD AUTO: 0 % (ref 0–0.5)
INR PPP: 1.1 (ref 0.9–1.1)
LACTATE SERPL-SCNC: 1.2 MMOL/L (ref 0.4–2)
LDLC SERPL CALC-MCNC: 68.4 MG/DL (ref 0–100)
LDLC SERPL CALC-MCNC: 81.4 MG/DL (ref 0–100)
LIPASE SERPL-CCNC: 17 U/L (ref 13–75)
LYMPHOCYTES # BLD: 1.9 K/UL (ref 0.8–3.5)
LYMPHOCYTES NFR BLD: 17 % (ref 12–49)
MCH RBC QN AUTO: 27.9 PG (ref 26–34)
MCHC RBC AUTO-ENTMCNC: 31.9 G/DL (ref 30–36.5)
MCV RBC AUTO: 87.3 FL (ref 80–99)
MONOCYTES # BLD: 1.3 K/UL (ref 0–1)
MONOCYTES NFR BLD: 11 % (ref 5–13)
NEUTS SEG # BLD: 8 K/UL (ref 1.8–8)
NEUTS SEG NFR BLD: 71 % (ref 32–75)
NRBC # BLD: 0 K/UL (ref 0–0.01)
NRBC BLD-RTO: 0 PER 100 WBC
PLATELET # BLD AUTO: 289 K/UL (ref 150–400)
PMV BLD AUTO: 10.6 FL (ref 8.9–12.9)
POTASSIUM SERPL-SCNC: 4.1 MMOL/L (ref 3.5–5.1)
PROTHROMBIN TIME: 11.2 SEC (ref 9–11.1)
RBC # BLD AUTO: 3.55 M/UL (ref 3.8–5.2)
SODIUM SERPL-SCNC: 144 MMOL/L (ref 136–145)
TRIGL SERPL-MCNC: 73 MG/DL
TRIGL SERPL-MCNC: 88 MG/DL
TSH SERPL DL<=0.05 MIU/L-ACNC: 1.08 UIU/ML (ref 0.36–3.74)
VLDLC SERPL CALC-MCNC: 14.6 MG/DL
VLDLC SERPL CALC-MCNC: 17.6 MG/DL
WBC # BLD AUTO: 11.3 K/UL (ref 3.6–11)

## 2023-12-29 PROCEDURE — 83605 ASSAY OF LACTIC ACID: CPT

## 2023-12-29 PROCEDURE — 6360000002 HC RX W HCPCS: Performed by: NURSE PRACTITIONER

## 2023-12-29 PROCEDURE — 80061 LIPID PANEL: CPT

## 2023-12-29 PROCEDURE — A4216 STERILE WATER/SALINE, 10 ML: HCPCS | Performed by: NURSE PRACTITIONER

## 2023-12-29 PROCEDURE — 84443 ASSAY THYROID STIM HORMONE: CPT

## 2023-12-29 PROCEDURE — C9113 INJ PANTOPRAZOLE SODIUM, VIA: HCPCS | Performed by: NURSE PRACTITIONER

## 2023-12-29 PROCEDURE — 83690 ASSAY OF LIPASE: CPT

## 2023-12-29 PROCEDURE — 2500000003 HC RX 250 WO HCPCS: Performed by: NURSE PRACTITIONER

## 2023-12-29 PROCEDURE — 87040 BLOOD CULTURE FOR BACTERIA: CPT

## 2023-12-29 PROCEDURE — 82150 ASSAY OF AMYLASE: CPT

## 2023-12-29 PROCEDURE — 85014 HEMATOCRIT: CPT

## 2023-12-29 PROCEDURE — 36415 COLL VENOUS BLD VENIPUNCTURE: CPT

## 2023-12-29 PROCEDURE — 85610 PROTHROMBIN TIME: CPT

## 2023-12-29 PROCEDURE — 82787 IGG 1 2 3 OR 4 EACH: CPT

## 2023-12-29 PROCEDURE — 2580000003 HC RX 258: Performed by: NURSE PRACTITIONER

## 2023-12-29 PROCEDURE — 6360000002 HC RX W HCPCS: Performed by: INTERNAL MEDICINE

## 2023-12-29 PROCEDURE — C9113 INJ PANTOPRAZOLE SODIUM, VIA: HCPCS | Performed by: STUDENT IN AN ORGANIZED HEALTH CARE EDUCATION/TRAINING PROGRAM

## 2023-12-29 PROCEDURE — 2580000003 HC RX 258

## 2023-12-29 PROCEDURE — 94640 AIRWAY INHALATION TREATMENT: CPT

## 2023-12-29 PROCEDURE — 6370000000 HC RX 637 (ALT 250 FOR IP): Performed by: NURSE PRACTITIONER

## 2023-12-29 PROCEDURE — 80048 BASIC METABOLIC PNL TOTAL CA: CPT

## 2023-12-29 PROCEDURE — 1100000000 HC RM PRIVATE

## 2023-12-29 PROCEDURE — A4216 STERILE WATER/SALINE, 10 ML: HCPCS | Performed by: STUDENT IN AN ORGANIZED HEALTH CARE EDUCATION/TRAINING PROGRAM

## 2023-12-29 PROCEDURE — 85018 HEMOGLOBIN: CPT

## 2023-12-29 PROCEDURE — 86038 ANTINUCLEAR ANTIBODIES: CPT

## 2023-12-29 PROCEDURE — 2580000003 HC RX 258: Performed by: STUDENT IN AN ORGANIZED HEALTH CARE EDUCATION/TRAINING PROGRAM

## 2023-12-29 PROCEDURE — 85025 COMPLETE CBC W/AUTO DIFF WBC: CPT

## 2023-12-29 PROCEDURE — 6360000002 HC RX W HCPCS: Performed by: STUDENT IN AN ORGANIZED HEALTH CARE EDUCATION/TRAINING PROGRAM

## 2023-12-29 RX ORDER — POTASSIUM CHLORIDE 7.45 MG/ML
10 INJECTION INTRAVENOUS PRN
Status: DISCONTINUED | OUTPATIENT
Start: 2023-12-29 | End: 2023-12-29

## 2023-12-29 RX ORDER — SODIUM CHLORIDE 9 MG/ML
INJECTION, SOLUTION INTRAVENOUS CONTINUOUS
Status: DISCONTINUED | OUTPATIENT
Start: 2023-12-29 | End: 2023-12-29

## 2023-12-29 RX ORDER — SODIUM CHLORIDE 0.9 % (FLUSH) 0.9 %
5-40 SYRINGE (ML) INJECTION PRN
Status: DISCONTINUED | OUTPATIENT
Start: 2023-12-29 | End: 2024-01-04 | Stop reason: HOSPADM

## 2023-12-29 RX ORDER — CHOLECALCIFEROL (VITAMIN D3) 125 MCG
1000 CAPSULE ORAL DAILY
Status: DISCONTINUED | OUTPATIENT
Start: 2023-12-29 | End: 2024-01-04 | Stop reason: HOSPADM

## 2023-12-29 RX ORDER — ALBUTEROL SULFATE 2.5 MG/3ML
2.5 SOLUTION RESPIRATORY (INHALATION) EVERY 4 HOURS PRN
Status: DISCONTINUED | OUTPATIENT
Start: 2023-12-29 | End: 2024-01-04 | Stop reason: HOSPADM

## 2023-12-29 RX ORDER — SODIUM CHLORIDE, SODIUM LACTATE, POTASSIUM CHLORIDE, CALCIUM CHLORIDE 600; 310; 30; 20 MG/100ML; MG/100ML; MG/100ML; MG/100ML
INJECTION, SOLUTION INTRAVENOUS CONTINUOUS
Status: DISCONTINUED | OUTPATIENT
Start: 2023-12-29 | End: 2024-01-02

## 2023-12-29 RX ORDER — SODIUM CHLORIDE, SODIUM LACTATE, POTASSIUM CHLORIDE, AND CALCIUM CHLORIDE .6; .31; .03; .02 G/100ML; G/100ML; G/100ML; G/100ML
1000 INJECTION, SOLUTION INTRAVENOUS ONCE
Status: COMPLETED | OUTPATIENT
Start: 2023-12-29 | End: 2023-12-29

## 2023-12-29 RX ORDER — LABETALOL HYDROCHLORIDE 5 MG/ML
5 INJECTION, SOLUTION INTRAVENOUS EVERY 6 HOURS PRN
Status: DISCONTINUED | OUTPATIENT
Start: 2023-12-29 | End: 2024-01-04 | Stop reason: HOSPADM

## 2023-12-29 RX ORDER — CALCIUM CARBONATE 500 MG/1
3750 TABLET, CHEWABLE ORAL DAILY
Status: DISCONTINUED | OUTPATIENT
Start: 2023-12-29 | End: 2024-01-04 | Stop reason: HOSPADM

## 2023-12-29 RX ORDER — MORPHINE SULFATE 2 MG/ML
2 INJECTION, SOLUTION INTRAMUSCULAR; INTRAVENOUS EVERY 4 HOURS PRN
Status: DISCONTINUED | OUTPATIENT
Start: 2023-12-29 | End: 2023-12-29

## 2023-12-29 RX ORDER — HYDROMORPHONE HYDROCHLORIDE 1 MG/ML
0.5 INJECTION, SOLUTION INTRAMUSCULAR; INTRAVENOUS; SUBCUTANEOUS
Status: DISCONTINUED | OUTPATIENT
Start: 2023-12-29 | End: 2023-12-29

## 2023-12-29 RX ORDER — ZOLPIDEM TARTRATE 5 MG/1
10 TABLET ORAL NIGHTLY
Status: DISCONTINUED | OUTPATIENT
Start: 2023-12-29 | End: 2023-12-29

## 2023-12-29 RX ORDER — HYDROMORPHONE HYDROCHLORIDE 1 MG/ML
1 INJECTION, SOLUTION INTRAMUSCULAR; INTRAVENOUS; SUBCUTANEOUS
Status: DISCONTINUED | OUTPATIENT
Start: 2023-12-29 | End: 2024-01-04 | Stop reason: HOSPADM

## 2023-12-29 RX ORDER — POTASSIUM CHLORIDE 20 MEQ/1
40 TABLET, EXTENDED RELEASE ORAL PRN
Status: DISCONTINUED | OUTPATIENT
Start: 2023-12-29 | End: 2023-12-29

## 2023-12-29 RX ORDER — ONDANSETRON 4 MG/1
4 TABLET, ORALLY DISINTEGRATING ORAL EVERY 8 HOURS PRN
Status: DISCONTINUED | OUTPATIENT
Start: 2023-12-29 | End: 2024-01-04 | Stop reason: HOSPADM

## 2023-12-29 RX ORDER — SODIUM CHLORIDE 9 MG/ML
INJECTION, SOLUTION INTRAVENOUS PRN
Status: DISCONTINUED | OUTPATIENT
Start: 2023-12-29 | End: 2024-01-04 | Stop reason: HOSPADM

## 2023-12-29 RX ORDER — ERGOCALCIFEROL 1.25 MG/1
50000 CAPSULE ORAL
Status: DISCONTINUED | OUTPATIENT
Start: 2023-12-29 | End: 2024-01-04 | Stop reason: HOSPADM

## 2023-12-29 RX ORDER — ACETAMINOPHEN 650 MG/1
650 SUPPOSITORY RECTAL EVERY 6 HOURS PRN
Status: DISCONTINUED | OUTPATIENT
Start: 2023-12-29 | End: 2024-01-04 | Stop reason: HOSPADM

## 2023-12-29 RX ORDER — LANOLIN ALCOHOL/MO/W.PET/CERES
6 CREAM (GRAM) TOPICAL NIGHTLY PRN
Status: DISCONTINUED | OUTPATIENT
Start: 2023-12-29 | End: 2024-01-04 | Stop reason: HOSPADM

## 2023-12-29 RX ORDER — ACETAMINOPHEN 325 MG/1
650 TABLET ORAL EVERY 6 HOURS PRN
Status: DISCONTINUED | OUTPATIENT
Start: 2023-12-29 | End: 2024-01-04 | Stop reason: HOSPADM

## 2023-12-29 RX ORDER — ALPRAZOLAM 0.5 MG/1
0.5 TABLET ORAL NIGHTLY PRN
Status: DISCONTINUED | OUTPATIENT
Start: 2023-12-29 | End: 2024-01-04 | Stop reason: HOSPADM

## 2023-12-29 RX ORDER — POLYETHYLENE GLYCOL 3350 17 G/17G
17 POWDER, FOR SOLUTION ORAL DAILY PRN
Status: DISCONTINUED | OUTPATIENT
Start: 2023-12-29 | End: 2024-01-04 | Stop reason: HOSPADM

## 2023-12-29 RX ORDER — MAGNESIUM SULFATE IN WATER 40 MG/ML
2000 INJECTION, SOLUTION INTRAVENOUS PRN
Status: DISCONTINUED | OUTPATIENT
Start: 2023-12-29 | End: 2023-12-29

## 2023-12-29 RX ORDER — ENOXAPARIN SODIUM 100 MG/ML
30 INJECTION SUBCUTANEOUS 2 TIMES DAILY
Status: DISCONTINUED | OUTPATIENT
Start: 2023-12-29 | End: 2023-12-31

## 2023-12-29 RX ORDER — DIPHENHYDRAMINE HCL 25 MG
25 CAPSULE ORAL EVERY 6 HOURS PRN
Status: DISCONTINUED | OUTPATIENT
Start: 2023-12-29 | End: 2024-01-04 | Stop reason: HOSPADM

## 2023-12-29 RX ORDER — SULFAMETHOXAZOLE AND TRIMETHOPRIM 800; 160 MG/1; MG/1
1 TABLET ORAL NIGHTLY
Status: DISCONTINUED | OUTPATIENT
Start: 2023-12-29 | End: 2024-01-04 | Stop reason: HOSPADM

## 2023-12-29 RX ORDER — DULOXETIN HYDROCHLORIDE 30 MG/1
90 CAPSULE, DELAYED RELEASE ORAL DAILY
Status: DISCONTINUED | OUTPATIENT
Start: 2023-12-29 | End: 2024-01-04 | Stop reason: HOSPADM

## 2023-12-29 RX ORDER — BUDESONIDE 0.5 MG/2ML
0.5 INHALANT ORAL
Status: DISCONTINUED | OUTPATIENT
Start: 2023-12-29 | End: 2024-01-04 | Stop reason: HOSPADM

## 2023-12-29 RX ORDER — ALBUTEROL SULFATE 2.5 MG/3ML
2.5 SOLUTION RESPIRATORY (INHALATION) ONCE
Status: DISCONTINUED | OUTPATIENT
Start: 2023-12-29 | End: 2024-01-04 | Stop reason: HOSPADM

## 2023-12-29 RX ORDER — ONDANSETRON 2 MG/ML
4 INJECTION INTRAMUSCULAR; INTRAVENOUS EVERY 6 HOURS PRN
Status: DISCONTINUED | OUTPATIENT
Start: 2023-12-29 | End: 2024-01-04 | Stop reason: HOSPADM

## 2023-12-29 RX ORDER — SODIUM CHLORIDE 0.9 % (FLUSH) 0.9 %
5-40 SYRINGE (ML) INJECTION EVERY 12 HOURS SCHEDULED
Status: DISCONTINUED | OUTPATIENT
Start: 2023-12-29 | End: 2024-01-04 | Stop reason: HOSPADM

## 2023-12-29 RX ORDER — ARFORMOTEROL TARTRATE 15 UG/2ML
15 SOLUTION RESPIRATORY (INHALATION)
Status: DISCONTINUED | OUTPATIENT
Start: 2023-12-29 | End: 2024-01-04 | Stop reason: HOSPADM

## 2023-12-29 RX ADMIN — SODIUM CHLORIDE, POTASSIUM CHLORIDE, SODIUM LACTATE AND CALCIUM CHLORIDE: 600; 310; 30; 20 INJECTION, SOLUTION INTRAVENOUS at 12:38

## 2023-12-29 RX ADMIN — PANTOPRAZOLE SODIUM 40 MG: 40 INJECTION, POWDER, FOR SOLUTION INTRAVENOUS at 09:06

## 2023-12-29 RX ADMIN — CALCIUM CARBONATE 3750 MG: 500 TABLET, CHEWABLE ORAL at 12:05

## 2023-12-29 RX ADMIN — SODIUM CHLORIDE, PRESERVATIVE FREE 10 ML: 5 INJECTION INTRAVENOUS at 09:06

## 2023-12-29 RX ADMIN — CYANOCOBALAMIN TAB 500 MCG 1000 MCG: 500 TAB at 12:05

## 2023-12-29 RX ADMIN — SODIUM CHLORIDE, POTASSIUM CHLORIDE, SODIUM LACTATE AND CALCIUM CHLORIDE 1000 ML: 600; 310; 30; 20 INJECTION, SOLUTION INTRAVENOUS at 12:36

## 2023-12-29 RX ADMIN — HYDROMORPHONE HYDROCHLORIDE 1 MG: 1 INJECTION, SOLUTION INTRAMUSCULAR; INTRAVENOUS; SUBCUTANEOUS at 09:06

## 2023-12-29 RX ADMIN — ARFORMOTEROL TARTRATE 15 MCG: 15 SOLUTION RESPIRATORY (INHALATION) at 09:04

## 2023-12-29 RX ADMIN — ONDANSETRON 4 MG: 2 INJECTION INTRAMUSCULAR; INTRAVENOUS at 05:42

## 2023-12-29 RX ADMIN — SODIUM CHLORIDE, PRESERVATIVE FREE 10 ML: 5 INJECTION INTRAVENOUS at 22:15

## 2023-12-29 RX ADMIN — HYDROMORPHONE HYDROCHLORIDE 1 MG: 1 INJECTION, SOLUTION INTRAMUSCULAR; INTRAVENOUS; SUBCUTANEOUS at 18:45

## 2023-12-29 RX ADMIN — SODIUM CHLORIDE, POTASSIUM CHLORIDE, SODIUM LACTATE AND CALCIUM CHLORIDE: 600; 310; 30; 20 INJECTION, SOLUTION INTRAVENOUS at 22:06

## 2023-12-29 RX ADMIN — MORPHINE SULFATE 2 MG: 2 INJECTION, SOLUTION INTRAMUSCULAR; INTRAVENOUS at 17:40

## 2023-12-29 RX ADMIN — BUDESONIDE 500 MCG: 0.5 INHALANT RESPIRATORY (INHALATION) at 09:04

## 2023-12-29 RX ADMIN — HYDROMORPHONE HYDROCHLORIDE 0.5 MG: 1 INJECTION, SOLUTION INTRAMUSCULAR; INTRAVENOUS; SUBCUTANEOUS at 02:30

## 2023-12-29 RX ADMIN — ONDANSETRON 4 MG: 2 INJECTION INTRAMUSCULAR; INTRAVENOUS at 17:49

## 2023-12-29 RX ADMIN — SULFAMETHOXAZOLE AND TRIMETHOPRIM 1 TABLET: 800; 160 TABLET ORAL at 22:14

## 2023-12-29 RX ADMIN — SODIUM CHLORIDE, PRESERVATIVE FREE 40 MG: 5 INJECTION INTRAVENOUS at 22:13

## 2023-12-29 RX ADMIN — SODIUM CHLORIDE: 9 INJECTION, SOLUTION INTRAVENOUS at 02:32

## 2023-12-29 RX ADMIN — HYDROMORPHONE HYDROCHLORIDE 1 MG: 1 INJECTION, SOLUTION INTRAMUSCULAR; INTRAVENOUS; SUBCUTANEOUS at 12:35

## 2023-12-29 RX ADMIN — ERGOCALCIFEROL 50000 UNITS: 1.25 CAPSULE ORAL at 12:04

## 2023-12-29 RX ADMIN — ENOXAPARIN SODIUM 30 MG: 100 INJECTION SUBCUTANEOUS at 02:30

## 2023-12-29 RX ADMIN — BUDESONIDE 500 MCG: 0.5 INHALANT RESPIRATORY (INHALATION) at 19:50

## 2023-12-29 RX ADMIN — ENOXAPARIN SODIUM 30 MG: 100 INJECTION SUBCUTANEOUS at 09:07

## 2023-12-29 RX ADMIN — ARFORMOTEROL TARTRATE 15 MCG: 15 SOLUTION RESPIRATORY (INHALATION) at 19:50

## 2023-12-29 RX ADMIN — HYDROMORPHONE HYDROCHLORIDE 1 MG: 1 INJECTION, SOLUTION INTRAMUSCULAR; INTRAVENOUS; SUBCUTANEOUS at 05:39

## 2023-12-29 RX ADMIN — DIPHENHYDRAMINE HYDROCHLORIDE 25 MG: 25 CAPSULE ORAL at 14:18

## 2023-12-29 RX ADMIN — ENOXAPARIN SODIUM 30 MG: 100 INJECTION SUBCUTANEOUS at 22:13

## 2023-12-29 RX ADMIN — HYDROMORPHONE HYDROCHLORIDE 1 MG: 1 INJECTION, SOLUTION INTRAMUSCULAR; INTRAVENOUS; SUBCUTANEOUS at 22:14

## 2023-12-29 ASSESSMENT — PAIN DESCRIPTION - LOCATION
LOCATION: ABDOMEN
LOCATION: ABDOMEN;BACK
LOCATION: ABDOMEN

## 2023-12-29 ASSESSMENT — PAIN DESCRIPTION - DESCRIPTORS
DESCRIPTORS: ACHING;SHARP
DESCRIPTORS: SHARP;ACHING
DESCRIPTORS: SHARP
DESCRIPTORS: DULL;SHARP
DESCRIPTORS: SHARP

## 2023-12-29 ASSESSMENT — PAIN DESCRIPTION - ONSET: ONSET: ON-GOING

## 2023-12-29 ASSESSMENT — PAIN SCALES - GENERAL
PAINLEVEL_OUTOF10: 5
PAINLEVEL_OUTOF10: 8
PAINLEVEL_OUTOF10: 9
PAINLEVEL_OUTOF10: 10
PAINLEVEL_OUTOF10: 9
PAINLEVEL_OUTOF10: 8
PAINLEVEL_OUTOF10: 5
PAINLEVEL_OUTOF10: 6
PAINLEVEL_OUTOF10: 7
PAINLEVEL_OUTOF10: 5

## 2023-12-29 ASSESSMENT — PAIN DESCRIPTION - ORIENTATION
ORIENTATION: ANTERIOR
ORIENTATION: ANTERIOR

## 2023-12-29 ASSESSMENT — PAIN DESCRIPTION - FREQUENCY: FREQUENCY: CONTINUOUS

## 2023-12-29 ASSESSMENT — PAIN - FUNCTIONAL ASSESSMENT
PAIN_FUNCTIONAL_ASSESSMENT: PREVENTS OR INTERFERES SOME ACTIVE ACTIVITIES AND ADLS
PAIN_FUNCTIONAL_ASSESSMENT: PREVENTS OR INTERFERES SOME ACTIVE ACTIVITIES AND ADLS

## 2023-12-29 ASSESSMENT — PAIN DESCRIPTION - PAIN TYPE: TYPE: ACUTE PAIN

## 2023-12-29 NOTE — H&P
Hospitalist Admission Note    NAME: Marcelina Topete   :  1960   MRN:  [id number]     Date/Time:  2023 5:43 AM    Patient PCP: Elsa Pandey APRN - NP    ______________________________________________________________________  Given the patient's current clinical presentation in regards to the patient's multiple medical problems, complex decision making was performed, which includes reviewing the patient's available past medical records, laboratory results, and diagnostic studies.       My assessment of this patient's clinical condition and my plan of care is as follows.    Assessment / Plan:    Acute Pancreatitis  Patient has been experiencing abdominal pain since [place].  Abdominal pain again this diffuse lasting approximately 5 minutes and upon return pain is more centered in the abdomen. Has not eaten in 2 days.  CT abdomen pelvis on 2023 showed peripancreatic inflammatory stranding, fluid and soft tissue.  Right pericolic and pelvic fluid.  Findings may represent acute pancreatitis.  Lipase normal at 17  Amylase 16  Admission WBC 17.1   NS @ 125 ml/hr  PRN hydromorphone for pain  GI consultation appreciated  NPO  Apixaban on hold due to possible procedure today  Lipid panel pending  BMP, CBC,TSH     Paroxysmal Atrial Fibrillation  Pulmonary Embolism  COPD  Patient is disabled due to pulmonary embolism in 2017.  Pulmonary embolism is being treated with apixaban.  Patient is also known to experience paroxysmal atrial fibrillation and have lupus anticoagulant.  Apixaban on hold due to possible procedure today    History of COPD  Does not use home oxygen; currently on room air  Maintain SpO2 greater than 92%  No active exacerbation at this time  Continue PTA albuterol sulfate, arformoterol-budesonide, mometasone-formoterol  PRN albuterol         Chronic UTIs   Continue PTA oral Bactrim    Anxiety Disorder  Depression   Continue PTA Cymbalta       Hx of Gastric Bypass Surgery  Hx of

## 2023-12-29 NOTE — ED PROVIDER NOTES
administration in time range)   HYDROmorphone HCl PF (DILAUDID) injection 0.5 mg (0.5 mg IntraVENous Given 12/29/23 0230)   iopamidol (ISOVUE-370) 76 % injection 100 mL (100 mLs IntraVENous Given 12/28/23 2142)   ondansetron (ZOFRAN) injection 4 mg (4 mg IntraVENous Given 12/28/23 2136)   morphine sulfate (PF) injection 4 mg (4 mg IntraVENous Given 12/28/23 2136)   sodium chloride 0.9 % bolus 1,000 mL (0 mLs IntraVENous Stopped 12/28/23 2207)        ED Course as of 12/29/23 0341   Thu Dec 28, 2023   2258 CT scan is independently interpreted by myself as fat stranding in the pancreatic and mid abdominal region, will await formal radiology read [CM]      ED Course User Index  [CM] Min-Lissette Pichardo MD        Basic metabolic panel with normal creatinine of 0.96, CBC with leukocytosis of 17.1.    I reviewed her office visit from 2/13/2022, noted to have a history of frequent urinary tract infections for which she takes Bactrim.    UA is not suggestive of UTI.    After multiple doses of pain medications, patient reports persistent severe pain.  Given this, I believe she warrants admission.    Critical Care Time:         Disposition:  Admit      PLAN:  1.      Medication List        ASK your doctor about these medications      * albuterol (2.5 MG/3ML) 0.083% nebulizer solution  Commonly known as: PROVENTIL     * albuterol sulfate  (90 Base) MCG/ACT inhaler  Commonly known as: PROVENTIL;VENTOLIN;PROAIR     ALPRAZolam 0.5 MG tablet  Commonly known as: XANAX     apixaban 5 MG Tabs tablet  Commonly known as: ELIQUIS     calcium carbonate 1250 (500 Ca) MG chewable tablet  Commonly known as: OS-PATTI     cyanocobalamin 1000 MCG tablet     cyclobenzaprine 10 MG tablet  Commonly known as: FLEXERIL     diphenhydrAMINE 25 MG capsule  Commonly known as: BENADRYL     DULoxetine 30 MG extended release capsule  Commonly known as: CYMBALTA     ergocalciferol 1.25 MG (79071 UT) capsule  Commonly known as: ERGOCALCIFEROL

## 2023-12-30 LAB
ALBUMIN SERPL-MCNC: 2.8 G/DL (ref 3.5–5)
ALBUMIN/GLOB SERPL: 1 (ref 1.1–2.2)
ALP SERPL-CCNC: 60 U/L (ref 45–117)
ALT SERPL-CCNC: 24 U/L (ref 12–78)
ANION GAP SERPL CALC-SCNC: 9 MMOL/L (ref 5–15)
AST SERPL-CCNC: 21 U/L (ref 15–37)
BASOPHILS # BLD: 0.1 K/UL (ref 0–0.1)
BASOPHILS NFR BLD: 1 % (ref 0–1)
BILIRUB SERPL-MCNC: 0.4 MG/DL (ref 0.2–1)
BUN SERPL-MCNC: 9 MG/DL (ref 6–20)
BUN/CREAT SERPL: 15 (ref 12–20)
CALCIUM SERPL-MCNC: 8.9 MG/DL (ref 8.5–10.1)
CHLORIDE SERPL-SCNC: 109 MMOL/L (ref 97–108)
CO2 SERPL-SCNC: 25 MMOL/L (ref 21–32)
CREAT SERPL-MCNC: 0.62 MG/DL (ref 0.55–1.02)
DIFFERENTIAL METHOD BLD: ABNORMAL
EOSINOPHIL # BLD: 0.2 K/UL (ref 0–0.4)
EOSINOPHIL NFR BLD: 3 % (ref 0–7)
ERYTHROCYTE [DISTWIDTH] IN BLOOD BY AUTOMATED COUNT: 15.1 % (ref 11.5–14.5)
GLOBULIN SER CALC-MCNC: 2.9 G/DL (ref 2–4)
GLUCOSE SERPL-MCNC: 101 MG/DL (ref 65–100)
HCT VFR BLD AUTO: 27.4 % (ref 35–47)
HGB BLD-MCNC: 8.6 G/DL (ref 11.5–16)
IMM GRANULOCYTES # BLD AUTO: 0.1 K/UL (ref 0–0.04)
IMM GRANULOCYTES NFR BLD AUTO: 1 % (ref 0–0.5)
IRON SATN MFR SERPL: 4 % (ref 20–50)
IRON SERPL-MCNC: 13 UG/DL (ref 35–150)
LYMPHOCYTES # BLD: 1.6 K/UL (ref 0.8–3.5)
LYMPHOCYTES NFR BLD: 19 % (ref 12–49)
MCH RBC QN AUTO: 28 PG (ref 26–34)
MCHC RBC AUTO-ENTMCNC: 31.4 G/DL (ref 30–36.5)
MCV RBC AUTO: 89.3 FL (ref 80–99)
MONOCYTES # BLD: 0.8 K/UL (ref 0–1)
MONOCYTES NFR BLD: 10 % (ref 5–13)
NEUTS SEG # BLD: 5.5 K/UL (ref 1.8–8)
NEUTS SEG NFR BLD: 66 % (ref 32–75)
NRBC # BLD: 0 K/UL (ref 0–0.01)
NRBC BLD-RTO: 0 PER 100 WBC
PLATELET # BLD AUTO: 239 K/UL (ref 150–400)
PMV BLD AUTO: 11.1 FL (ref 8.9–12.9)
POTASSIUM SERPL-SCNC: 3.9 MMOL/L (ref 3.5–5.1)
PROT SERPL-MCNC: 5.7 G/DL (ref 6.4–8.2)
RBC # BLD AUTO: 3.07 M/UL (ref 3.8–5.2)
SODIUM SERPL-SCNC: 143 MMOL/L (ref 136–145)
TIBC SERPL-MCNC: 354 UG/DL (ref 250–450)
WBC # BLD AUTO: 8.2 K/UL (ref 3.6–11)

## 2023-12-30 PROCEDURE — 2500000003 HC RX 250 WO HCPCS: Performed by: NURSE PRACTITIONER

## 2023-12-30 PROCEDURE — 1100000000 HC RM PRIVATE

## 2023-12-30 PROCEDURE — A4216 STERILE WATER/SALINE, 10 ML: HCPCS | Performed by: STUDENT IN AN ORGANIZED HEALTH CARE EDUCATION/TRAINING PROGRAM

## 2023-12-30 PROCEDURE — 6360000002 HC RX W HCPCS: Performed by: STUDENT IN AN ORGANIZED HEALTH CARE EDUCATION/TRAINING PROGRAM

## 2023-12-30 PROCEDURE — 36415 COLL VENOUS BLD VENIPUNCTURE: CPT

## 2023-12-30 PROCEDURE — C9113 INJ PANTOPRAZOLE SODIUM, VIA: HCPCS | Performed by: STUDENT IN AN ORGANIZED HEALTH CARE EDUCATION/TRAINING PROGRAM

## 2023-12-30 PROCEDURE — 83550 IRON BINDING TEST: CPT

## 2023-12-30 PROCEDURE — 94640 AIRWAY INHALATION TREATMENT: CPT

## 2023-12-30 PROCEDURE — 2580000003 HC RX 258

## 2023-12-30 PROCEDURE — 2580000003 HC RX 258: Performed by: NURSE PRACTITIONER

## 2023-12-30 PROCEDURE — 6360000002 HC RX W HCPCS: Performed by: INTERNAL MEDICINE

## 2023-12-30 PROCEDURE — 6370000000 HC RX 637 (ALT 250 FOR IP): Performed by: NURSE PRACTITIONER

## 2023-12-30 PROCEDURE — 6360000002 HC RX W HCPCS: Performed by: NURSE PRACTITIONER

## 2023-12-30 PROCEDURE — 80053 COMPREHEN METABOLIC PANEL: CPT

## 2023-12-30 PROCEDURE — 83540 ASSAY OF IRON: CPT

## 2023-12-30 PROCEDURE — 2580000003 HC RX 258: Performed by: STUDENT IN AN ORGANIZED HEALTH CARE EDUCATION/TRAINING PROGRAM

## 2023-12-30 PROCEDURE — 85025 COMPLETE CBC W/AUTO DIFF WBC: CPT

## 2023-12-30 RX ADMIN — HYDROMORPHONE HYDROCHLORIDE 1 MG: 1 INJECTION, SOLUTION INTRAMUSCULAR; INTRAVENOUS; SUBCUTANEOUS at 06:11

## 2023-12-30 RX ADMIN — ARFORMOTEROL TARTRATE 15 MCG: 15 SOLUTION RESPIRATORY (INHALATION) at 21:16

## 2023-12-30 RX ADMIN — ONDANSETRON 4 MG: 2 INJECTION INTRAMUSCULAR; INTRAVENOUS at 17:43

## 2023-12-30 RX ADMIN — SODIUM CHLORIDE, PRESERVATIVE FREE 40 MG: 5 INJECTION INTRAVENOUS at 10:43

## 2023-12-30 RX ADMIN — CYANOCOBALAMIN TAB 500 MCG 1000 MCG: 500 TAB at 10:47

## 2023-12-30 RX ADMIN — SODIUM CHLORIDE, POTASSIUM CHLORIDE, SODIUM LACTATE AND CALCIUM CHLORIDE: 600; 310; 30; 20 INJECTION, SOLUTION INTRAVENOUS at 23:37

## 2023-12-30 RX ADMIN — DULOXETINE HYDROCHLORIDE 90 MG: 30 CAPSULE, DELAYED RELEASE ORAL at 10:47

## 2023-12-30 RX ADMIN — ENOXAPARIN SODIUM 30 MG: 100 INJECTION SUBCUTANEOUS at 22:33

## 2023-12-30 RX ADMIN — DIPHENHYDRAMINE HYDROCHLORIDE 25 MG: 25 CAPSULE ORAL at 02:20

## 2023-12-30 RX ADMIN — SODIUM CHLORIDE, POTASSIUM CHLORIDE, SODIUM LACTATE AND CALCIUM CHLORIDE: 600; 310; 30; 20 INJECTION, SOLUTION INTRAVENOUS at 06:18

## 2023-12-30 RX ADMIN — HYDROMORPHONE HYDROCHLORIDE 1 MG: 1 INJECTION, SOLUTION INTRAMUSCULAR; INTRAVENOUS; SUBCUTANEOUS at 02:20

## 2023-12-30 RX ADMIN — SULFAMETHOXAZOLE AND TRIMETHOPRIM 1 TABLET: 800; 160 TABLET ORAL at 22:32

## 2023-12-30 RX ADMIN — BUDESONIDE 500 MCG: 0.5 INHALANT RESPIRATORY (INHALATION) at 08:50

## 2023-12-30 RX ADMIN — ENOXAPARIN SODIUM 30 MG: 100 INJECTION SUBCUTANEOUS at 10:49

## 2023-12-30 RX ADMIN — BUDESONIDE 500 MCG: 0.5 INHALANT RESPIRATORY (INHALATION) at 21:16

## 2023-12-30 RX ADMIN — ALPRAZOLAM 0.5 MG: 0.5 TABLET ORAL at 22:33

## 2023-12-30 RX ADMIN — ONDANSETRON 4 MG: 4 TABLET, ORALLY DISINTEGRATING ORAL at 02:20

## 2023-12-30 RX ADMIN — MELATONIN 6 MG: at 22:37

## 2023-12-30 RX ADMIN — ONDANSETRON 4 MG: 2 INJECTION INTRAMUSCULAR; INTRAVENOUS at 22:33

## 2023-12-30 RX ADMIN — HYDROMORPHONE HYDROCHLORIDE 1 MG: 1 INJECTION, SOLUTION INTRAMUSCULAR; INTRAVENOUS; SUBCUTANEOUS at 10:48

## 2023-12-30 RX ADMIN — SODIUM CHLORIDE, PRESERVATIVE FREE 40 MG: 5 INJECTION INTRAVENOUS at 22:33

## 2023-12-30 RX ADMIN — SODIUM CHLORIDE, POTASSIUM CHLORIDE, SODIUM LACTATE AND CALCIUM CHLORIDE: 600; 310; 30; 20 INJECTION, SOLUTION INTRAVENOUS at 14:49

## 2023-12-30 RX ADMIN — CALCIUM CARBONATE 3750 MG: 500 TABLET, CHEWABLE ORAL at 10:46

## 2023-12-30 RX ADMIN — SODIUM CHLORIDE, PRESERVATIVE FREE 10 ML: 5 INJECTION INTRAVENOUS at 22:44

## 2023-12-30 RX ADMIN — HYDROMORPHONE HYDROCHLORIDE 1 MG: 1 INJECTION, SOLUTION INTRAMUSCULAR; INTRAVENOUS; SUBCUTANEOUS at 17:35

## 2023-12-30 RX ADMIN — ARFORMOTEROL TARTRATE 15 MCG: 15 SOLUTION RESPIRATORY (INHALATION) at 08:49

## 2023-12-30 ASSESSMENT — PAIN DESCRIPTION - ORIENTATION
ORIENTATION: RIGHT;LEFT;UPPER;LOWER
ORIENTATION: ANTERIOR
ORIENTATION: RIGHT;LEFT;MID;UPPER

## 2023-12-30 ASSESSMENT — PAIN SCALES - GENERAL
PAINLEVEL_OUTOF10: 6
PAINLEVEL_OUTOF10: 4
PAINLEVEL_OUTOF10: 1
PAINLEVEL_OUTOF10: 9
PAINLEVEL_OUTOF10: 2
PAINLEVEL_OUTOF10: 6
PAINLEVEL_OUTOF10: 3
PAINLEVEL_OUTOF10: 9
PAINLEVEL_OUTOF10: 6

## 2023-12-30 ASSESSMENT — PAIN DESCRIPTION - LOCATION
LOCATION: ABDOMEN;BACK
LOCATION: ABDOMEN;BACK
LOCATION: ABDOMEN
LOCATION: ABDOMEN;BACK
LOCATION: ABDOMEN
LOCATION: ABDOMEN;BACK

## 2023-12-30 ASSESSMENT — PAIN DESCRIPTION - DESCRIPTORS
DESCRIPTORS: ACHING;SHARP;STABBING
DESCRIPTORS: ACHING;STABBING;SHARP
DESCRIPTORS: ACHING;THROBBING

## 2023-12-30 ASSESSMENT — PAIN - FUNCTIONAL ASSESSMENT
PAIN_FUNCTIONAL_ASSESSMENT: ACTIVITIES ARE NOT PREVENTED
PAIN_FUNCTIONAL_ASSESSMENT: PREVENTS OR INTERFERES SOME ACTIVE ACTIVITIES AND ADLS
PAIN_FUNCTIONAL_ASSESSMENT: ACTIVITIES ARE NOT PREVENTED
PAIN_FUNCTIONAL_ASSESSMENT: ACTIVITIES ARE NOT PREVENTED

## 2023-12-30 NOTE — PLAN OF CARE
Problem: Discharge Planning  Goal: Discharge to home or other facility with appropriate resources  Outcome: Progressing     Problem: Pain  Goal: Verbalizes/displays adequate comfort level or baseline comfort level  Outcome: Progressing     Problem: Safety - Adult  Goal: Free from fall injury  Outcome: Progressing     Problem: Respiratory - Adult  Goal: Achieves optimal ventilation and oxygenation  12/30/2023 0015 by Franchesca Bryan, RN  Outcome: Progressing  12/29/2023 1952 by Lupe Gonzales, RT  Outcome: Progressing

## 2023-12-30 NOTE — PLAN OF CARE
Problem: Pain  Goal: Verbalizes/displays adequate comfort level or baseline comfort level  Outcome: Progressing     Problem: Safety - Adult  Goal: Free from fall injury  Outcome: Progressing     Problem: Respiratory - Adult  Goal: Achieves optimal ventilation and oxygenation  12/30/2023 1550 by Ria Mejia, RN  Outcome: Progressing  12/30/2023 1536 by Rhina Gonzalez, RT  Outcome: Progressing  Flowsheets (Taken 12/30/2023 1045 by Ria Mejia, RN)  Achieves optimal ventilation and oxygenation: Assess for changes in respiratory status

## 2023-12-31 ENCOUNTER — APPOINTMENT (OUTPATIENT)
Facility: HOSPITAL | Age: 63
DRG: 372 | End: 2023-12-31
Payer: MEDICARE

## 2023-12-31 LAB
ALBUMIN SERPL-MCNC: 2.8 G/DL (ref 3.5–5)
ALBUMIN/GLOB SERPL: 0.8 (ref 1.1–2.2)
ALP SERPL-CCNC: 61 U/L (ref 45–117)
ALT SERPL-CCNC: 25 U/L (ref 12–78)
ANION GAP SERPL CALC-SCNC: 4 MMOL/L (ref 5–15)
AST SERPL-CCNC: 37 U/L (ref 15–37)
BASOPHILS # BLD: 0 K/UL (ref 0–0.1)
BASOPHILS NFR BLD: 1 % (ref 0–1)
BILIRUB SERPL-MCNC: 0.8 MG/DL (ref 0.2–1)
BUN SERPL-MCNC: 5 MG/DL (ref 6–20)
BUN/CREAT SERPL: 9 (ref 12–20)
CALCIUM SERPL-MCNC: 9.1 MG/DL (ref 8.5–10.1)
CHLORIDE SERPL-SCNC: 107 MMOL/L (ref 97–108)
CO2 SERPL-SCNC: 26 MMOL/L (ref 21–32)
COMMENT:: NORMAL
CREAT SERPL-MCNC: 0.58 MG/DL (ref 0.55–1.02)
DIFFERENTIAL METHOD BLD: ABNORMAL
EOSINOPHIL # BLD: 0.4 K/UL (ref 0–0.4)
EOSINOPHIL NFR BLD: 5 % (ref 0–7)
ERYTHROCYTE [DISTWIDTH] IN BLOOD BY AUTOMATED COUNT: 15.1 % (ref 11.5–14.5)
GLOBULIN SER CALC-MCNC: 3.4 G/DL (ref 2–4)
GLUCOSE SERPL-MCNC: 84 MG/DL (ref 65–100)
HCT VFR BLD AUTO: 26.3 % (ref 35–47)
HCT VFR BLD AUTO: 28.9 % (ref 35–47)
HGB BLD-MCNC: 7.9 G/DL (ref 11.5–16)
HGB BLD-MCNC: 9 G/DL (ref 11.5–16)
IMM GRANULOCYTES # BLD AUTO: 0 K/UL (ref 0–0.04)
IMM GRANULOCYTES NFR BLD AUTO: 0 % (ref 0–0.5)
LYMPHOCYTES # BLD: 1.4 K/UL (ref 0.8–3.5)
LYMPHOCYTES NFR BLD: 18 % (ref 12–49)
MCH RBC QN AUTO: 28.2 PG (ref 26–34)
MCHC RBC AUTO-ENTMCNC: 31.1 G/DL (ref 30–36.5)
MCV RBC AUTO: 90.6 FL (ref 80–99)
MONOCYTES # BLD: 0.8 K/UL (ref 0–1)
MONOCYTES NFR BLD: 10 % (ref 5–13)
NEUTS SEG # BLD: 5.3 K/UL (ref 1.8–8)
NEUTS SEG NFR BLD: 66 % (ref 32–75)
NRBC # BLD: 0.02 K/UL (ref 0–0.01)
NRBC BLD-RTO: 0.3 PER 100 WBC
PLATELET # BLD AUTO: 246 K/UL (ref 150–400)
PMV BLD AUTO: 11.3 FL (ref 8.9–12.9)
POTASSIUM SERPL-SCNC: 4.6 MMOL/L (ref 3.5–5.1)
PROT SERPL-MCNC: 6.2 G/DL (ref 6.4–8.2)
RBC # BLD AUTO: 3.19 M/UL (ref 3.8–5.2)
SODIUM SERPL-SCNC: 137 MMOL/L (ref 136–145)
SPECIMEN HOLD: NORMAL
WBC # BLD AUTO: 7.9 K/UL (ref 3.6–11)

## 2023-12-31 PROCEDURE — 2580000003 HC RX 258: Performed by: NURSE PRACTITIONER

## 2023-12-31 PROCEDURE — 85025 COMPLETE CBC W/AUTO DIFF WBC: CPT

## 2023-12-31 PROCEDURE — 2500000003 HC RX 250 WO HCPCS: Performed by: NURSE PRACTITIONER

## 2023-12-31 PROCEDURE — 94640 AIRWAY INHALATION TREATMENT: CPT

## 2023-12-31 PROCEDURE — 6360000002 HC RX W HCPCS: Performed by: NURSE PRACTITIONER

## 2023-12-31 PROCEDURE — 6360000004 HC RX CONTRAST MEDICATION: Performed by: INTERNAL MEDICINE

## 2023-12-31 PROCEDURE — 6370000000 HC RX 637 (ALT 250 FOR IP): Performed by: NURSE PRACTITIONER

## 2023-12-31 PROCEDURE — A4216 STERILE WATER/SALINE, 10 ML: HCPCS | Performed by: NURSE PRACTITIONER

## 2023-12-31 PROCEDURE — 6360000002 HC RX W HCPCS: Performed by: STUDENT IN AN ORGANIZED HEALTH CARE EDUCATION/TRAINING PROGRAM

## 2023-12-31 PROCEDURE — C9113 INJ PANTOPRAZOLE SODIUM, VIA: HCPCS | Performed by: STUDENT IN AN ORGANIZED HEALTH CARE EDUCATION/TRAINING PROGRAM

## 2023-12-31 PROCEDURE — 1100000000 HC RM PRIVATE

## 2023-12-31 PROCEDURE — 36415 COLL VENOUS BLD VENIPUNCTURE: CPT

## 2023-12-31 PROCEDURE — 6360000002 HC RX W HCPCS: Performed by: INTERNAL MEDICINE

## 2023-12-31 PROCEDURE — 80053 COMPREHEN METABOLIC PANEL: CPT

## 2023-12-31 PROCEDURE — 85014 HEMATOCRIT: CPT

## 2023-12-31 PROCEDURE — 74177 CT ABD & PELVIS W/CONTRAST: CPT

## 2023-12-31 PROCEDURE — A4216 STERILE WATER/SALINE, 10 ML: HCPCS | Performed by: STUDENT IN AN ORGANIZED HEALTH CARE EDUCATION/TRAINING PROGRAM

## 2023-12-31 PROCEDURE — 85018 HEMOGLOBIN: CPT

## 2023-12-31 PROCEDURE — 2580000003 HC RX 258: Performed by: STUDENT IN AN ORGANIZED HEALTH CARE EDUCATION/TRAINING PROGRAM

## 2023-12-31 PROCEDURE — 2580000003 HC RX 258

## 2023-12-31 RX ADMIN — DULOXETINE HYDROCHLORIDE 90 MG: 30 CAPSULE, DELAYED RELEASE ORAL at 10:03

## 2023-12-31 RX ADMIN — SODIUM CHLORIDE, PRESERVATIVE FREE 40 MG: 5 INJECTION INTRAVENOUS at 10:03

## 2023-12-31 RX ADMIN — BUDESONIDE 500 MCG: 0.5 INHALANT RESPIRATORY (INHALATION) at 20:28

## 2023-12-31 RX ADMIN — SODIUM CHLORIDE, POTASSIUM CHLORIDE, SODIUM LACTATE AND CALCIUM CHLORIDE: 600; 310; 30; 20 INJECTION, SOLUTION INTRAVENOUS at 18:10

## 2023-12-31 RX ADMIN — ENOXAPARIN SODIUM 30 MG: 100 INJECTION SUBCUTANEOUS at 10:03

## 2023-12-31 RX ADMIN — ARFORMOTEROL TARTRATE 15 MCG: 15 SOLUTION RESPIRATORY (INHALATION) at 08:06

## 2023-12-31 RX ADMIN — SULFAMETHOXAZOLE AND TRIMETHOPRIM 1 TABLET: 800; 160 TABLET ORAL at 20:52

## 2023-12-31 RX ADMIN — HYDROMORPHONE HYDROCHLORIDE 1 MG: 1 INJECTION, SOLUTION INTRAMUSCULAR; INTRAVENOUS; SUBCUTANEOUS at 03:28

## 2023-12-31 RX ADMIN — SODIUM CHLORIDE, POTASSIUM CHLORIDE, SODIUM LACTATE AND CALCIUM CHLORIDE: 600; 310; 30; 20 INJECTION, SOLUTION INTRAVENOUS at 10:14

## 2023-12-31 RX ADMIN — SODIUM CHLORIDE, PRESERVATIVE FREE 10 ML: 5 INJECTION INTRAVENOUS at 10:04

## 2023-12-31 RX ADMIN — IOPAMIDOL 100 ML: 755 INJECTION, SOLUTION INTRAVENOUS at 11:15

## 2023-12-31 RX ADMIN — CALCIUM CARBONATE 3750 MG: 500 TABLET, CHEWABLE ORAL at 10:02

## 2023-12-31 RX ADMIN — HYDROMORPHONE HYDROCHLORIDE 1 MG: 1 INJECTION, SOLUTION INTRAMUSCULAR; INTRAVENOUS; SUBCUTANEOUS at 20:53

## 2023-12-31 RX ADMIN — HYDROMORPHONE HYDROCHLORIDE 1 MG: 1 INJECTION, SOLUTION INTRAMUSCULAR; INTRAVENOUS; SUBCUTANEOUS at 10:04

## 2023-12-31 RX ADMIN — SODIUM CHLORIDE, PRESERVATIVE FREE 40 MG: 5 INJECTION INTRAVENOUS at 20:53

## 2023-12-31 RX ADMIN — SODIUM CHLORIDE, PRESERVATIVE FREE 10 ML: 5 INJECTION INTRAVENOUS at 20:53

## 2023-12-31 RX ADMIN — CYANOCOBALAMIN TAB 500 MCG 1000 MCG: 500 TAB at 10:03

## 2023-12-31 RX ADMIN — ARFORMOTEROL TARTRATE 15 MCG: 15 SOLUTION RESPIRATORY (INHALATION) at 20:28

## 2023-12-31 RX ADMIN — BUDESONIDE 500 MCG: 0.5 INHALANT RESPIRATORY (INHALATION) at 08:06

## 2023-12-31 ASSESSMENT — PAIN DESCRIPTION - LOCATION
LOCATION: ABDOMEN;BACK
LOCATION: ABDOMEN;BACK
LOCATION: ABDOMEN

## 2023-12-31 ASSESSMENT — PAIN SCALES - GENERAL
PAINLEVEL_OUTOF10: 7
PAINLEVEL_OUTOF10: 3
PAINLEVEL_OUTOF10: 7
PAINLEVEL_OUTOF10: 7

## 2023-12-31 ASSESSMENT — PAIN DESCRIPTION - ONSET
ONSET: GRADUAL
ONSET: GRADUAL

## 2023-12-31 ASSESSMENT — PAIN DESCRIPTION - PAIN TYPE
TYPE: ACUTE PAIN
TYPE: ACUTE PAIN

## 2023-12-31 ASSESSMENT — PAIN DESCRIPTION - DESCRIPTORS
DESCRIPTORS: BURNING;SHARP;ACHING
DESCRIPTORS: BURNING;SHARP;ACHING
DESCRIPTORS: ACHING;SHARP

## 2023-12-31 ASSESSMENT — PAIN DESCRIPTION - FREQUENCY
FREQUENCY: INTERMITTENT
FREQUENCY: INTERMITTENT

## 2023-12-31 NOTE — PLAN OF CARE
Problem: Discharge Planning  Goal: Discharge to home or other facility with appropriate resources  Outcome: Progressing     Problem: Pain  Goal: Verbalizes/displays adequate comfort level or baseline comfort level  Outcome: Progressing     Problem: Safety - Adult  Goal: Free from fall injury  Outcome: Progressing     Problem: Respiratory - Adult  Goal: Achieves optimal ventilation and oxygenation  12/31/2023 1116 by Domenic Walton RN  Outcome: Progressing  12/31/2023 0827 by Rhina Gonzalez, RT  Outcome: Progressing

## 2024-01-01 LAB
ALBUMIN SERPL-MCNC: 2.6 G/DL (ref 3.5–5)
ALBUMIN/GLOB SERPL: 0.9 (ref 1.1–2.2)
ALP SERPL-CCNC: 55 U/L (ref 45–117)
ALT SERPL-CCNC: 20 U/L (ref 12–78)
ANION GAP SERPL CALC-SCNC: 5 MMOL/L (ref 5–15)
AST SERPL-CCNC: 19 U/L (ref 15–37)
BASOPHILS # BLD: 0.1 K/UL (ref 0–0.1)
BASOPHILS NFR BLD: 1 % (ref 0–1)
BILIRUB SERPL-MCNC: 0.6 MG/DL (ref 0.2–1)
BUN SERPL-MCNC: 4 MG/DL (ref 6–20)
BUN/CREAT SERPL: 8 (ref 12–20)
CALCIUM SERPL-MCNC: 8.9 MG/DL (ref 8.5–10.1)
CHLORIDE SERPL-SCNC: 105 MMOL/L (ref 97–108)
CO2 SERPL-SCNC: 26 MMOL/L (ref 21–32)
CREAT SERPL-MCNC: 0.49 MG/DL (ref 0.55–1.02)
DIFFERENTIAL METHOD BLD: ABNORMAL
EOSINOPHIL # BLD: 0.3 K/UL (ref 0–0.4)
EOSINOPHIL NFR BLD: 4 % (ref 0–7)
ERYTHROCYTE [DISTWIDTH] IN BLOOD BY AUTOMATED COUNT: 14.9 % (ref 11.5–14.5)
GLOBULIN SER CALC-MCNC: 3 G/DL (ref 2–4)
GLUCOSE SERPL-MCNC: 77 MG/DL (ref 65–100)
HCT VFR BLD AUTO: 25.4 % (ref 35–47)
HCT VFR BLD AUTO: 27 % (ref 35–47)
HGB BLD-MCNC: 7.8 G/DL (ref 11.5–16)
HGB BLD-MCNC: 8.4 G/DL (ref 11.5–16)
IMM GRANULOCYTES # BLD AUTO: 0 K/UL (ref 0–0.04)
IMM GRANULOCYTES NFR BLD AUTO: 1 % (ref 0–0.5)
LYMPHOCYTES # BLD: 1.1 K/UL (ref 0.8–3.5)
LYMPHOCYTES NFR BLD: 15 % (ref 12–49)
MCH RBC QN AUTO: 27.7 PG (ref 26–34)
MCHC RBC AUTO-ENTMCNC: 30.7 G/DL (ref 30–36.5)
MCV RBC AUTO: 90.1 FL (ref 80–99)
MONOCYTES # BLD: 0.8 K/UL (ref 0–1)
MONOCYTES NFR BLD: 11 % (ref 5–13)
NEUTS SEG # BLD: 5.4 K/UL (ref 1.8–8)
NEUTS SEG NFR BLD: 68 % (ref 32–75)
NRBC # BLD: 0.02 K/UL (ref 0–0.01)
NRBC BLD-RTO: 0.3 PER 100 WBC
PLATELET # BLD AUTO: 245 K/UL (ref 150–400)
PMV BLD AUTO: 11 FL (ref 8.9–12.9)
POTASSIUM SERPL-SCNC: 4 MMOL/L (ref 3.5–5.1)
PROT SERPL-MCNC: 5.6 G/DL (ref 6.4–8.2)
RBC # BLD AUTO: 2.82 M/UL (ref 3.8–5.2)
SODIUM SERPL-SCNC: 136 MMOL/L (ref 136–145)
WBC # BLD AUTO: 7.7 K/UL (ref 3.6–11)

## 2024-01-01 PROCEDURE — 6370000000 HC RX 637 (ALT 250 FOR IP): Performed by: NURSE PRACTITIONER

## 2024-01-01 PROCEDURE — C9113 INJ PANTOPRAZOLE SODIUM, VIA: HCPCS | Performed by: STUDENT IN AN ORGANIZED HEALTH CARE EDUCATION/TRAINING PROGRAM

## 2024-01-01 PROCEDURE — 2580000003 HC RX 258

## 2024-01-01 PROCEDURE — 2700000000 HC OXYGEN THERAPY PER DAY

## 2024-01-01 PROCEDURE — 85025 COMPLETE CBC W/AUTO DIFF WBC: CPT

## 2024-01-01 PROCEDURE — 80053 COMPREHEN METABOLIC PANEL: CPT

## 2024-01-01 PROCEDURE — 6360000002 HC RX W HCPCS: Performed by: STUDENT IN AN ORGANIZED HEALTH CARE EDUCATION/TRAINING PROGRAM

## 2024-01-01 PROCEDURE — 94640 AIRWAY INHALATION TREATMENT: CPT

## 2024-01-01 PROCEDURE — 85014 HEMATOCRIT: CPT

## 2024-01-01 PROCEDURE — 94761 N-INVAS EAR/PLS OXIMETRY MLT: CPT

## 2024-01-01 PROCEDURE — 36415 COLL VENOUS BLD VENIPUNCTURE: CPT

## 2024-01-01 PROCEDURE — 6360000002 HC RX W HCPCS: Performed by: INTERNAL MEDICINE

## 2024-01-01 PROCEDURE — 2580000003 HC RX 258: Performed by: NURSE PRACTITIONER

## 2024-01-01 PROCEDURE — 1100000000 HC RM PRIVATE

## 2024-01-01 PROCEDURE — A4216 STERILE WATER/SALINE, 10 ML: HCPCS | Performed by: STUDENT IN AN ORGANIZED HEALTH CARE EDUCATION/TRAINING PROGRAM

## 2024-01-01 PROCEDURE — 2580000003 HC RX 258: Performed by: STUDENT IN AN ORGANIZED HEALTH CARE EDUCATION/TRAINING PROGRAM

## 2024-01-01 PROCEDURE — 85018 HEMOGLOBIN: CPT

## 2024-01-01 RX ADMIN — ALPRAZOLAM 0.5 MG: 0.5 TABLET ORAL at 01:07

## 2024-01-01 RX ADMIN — CALCIUM CARBONATE 3750 MG: 500 TABLET, CHEWABLE ORAL at 12:24

## 2024-01-01 RX ADMIN — SODIUM CHLORIDE, PRESERVATIVE FREE 40 MG: 5 INJECTION INTRAVENOUS at 21:45

## 2024-01-01 RX ADMIN — SODIUM CHLORIDE, POTASSIUM CHLORIDE, SODIUM LACTATE AND CALCIUM CHLORIDE: 600; 310; 30; 20 INJECTION, SOLUTION INTRAVENOUS at 09:28

## 2024-01-01 RX ADMIN — ACETAMINOPHEN 650 MG: 325 TABLET ORAL at 21:52

## 2024-01-01 RX ADMIN — SODIUM CHLORIDE, POTASSIUM CHLORIDE, SODIUM LACTATE AND CALCIUM CHLORIDE: 600; 310; 30; 20 INJECTION, SOLUTION INTRAVENOUS at 18:33

## 2024-01-01 RX ADMIN — SODIUM CHLORIDE, PRESERVATIVE FREE 10 ML: 5 INJECTION INTRAVENOUS at 21:48

## 2024-01-01 RX ADMIN — BUDESONIDE 500 MCG: 0.5 INHALANT RESPIRATORY (INHALATION) at 20:28

## 2024-01-01 RX ADMIN — DULOXETINE HYDROCHLORIDE 90 MG: 30 CAPSULE, DELAYED RELEASE ORAL at 12:25

## 2024-01-01 RX ADMIN — ACETAMINOPHEN 650 MG: 325 TABLET ORAL at 05:43

## 2024-01-01 RX ADMIN — ARFORMOTEROL TARTRATE 15 MCG: 15 SOLUTION RESPIRATORY (INHALATION) at 20:28

## 2024-01-01 RX ADMIN — SODIUM CHLORIDE, PRESERVATIVE FREE 10 ML: 5 INJECTION INTRAVENOUS at 09:30

## 2024-01-01 RX ADMIN — SULFAMETHOXAZOLE AND TRIMETHOPRIM 1 TABLET: 800; 160 TABLET ORAL at 21:45

## 2024-01-01 RX ADMIN — CYANOCOBALAMIN TAB 500 MCG 1000 MCG: 500 TAB at 10:06

## 2024-01-01 RX ADMIN — ARFORMOTEROL TARTRATE 15 MCG: 15 SOLUTION RESPIRATORY (INHALATION) at 08:21

## 2024-01-01 RX ADMIN — BUDESONIDE 500 MCG: 0.5 INHALANT RESPIRATORY (INHALATION) at 08:21

## 2024-01-01 RX ADMIN — SODIUM CHLORIDE, PRESERVATIVE FREE 40 MG: 5 INJECTION INTRAVENOUS at 09:30

## 2024-01-01 RX ADMIN — ALPRAZOLAM 0.5 MG: 0.5 TABLET ORAL at 21:52

## 2024-01-01 ASSESSMENT — PAIN DESCRIPTION - LOCATION
LOCATION: ABDOMEN
LOCATION: ABDOMEN

## 2024-01-01 ASSESSMENT — PAIN DESCRIPTION - ORIENTATION: ORIENTATION: LEFT

## 2024-01-01 ASSESSMENT — PAIN DESCRIPTION - DESCRIPTORS: DESCRIPTORS: ACHING

## 2024-01-01 ASSESSMENT — PAIN SCALES - GENERAL
PAINLEVEL_OUTOF10: 5
PAINLEVEL_OUTOF10: 2

## 2024-01-01 NOTE — PLAN OF CARE
Problem: Discharge Planning  Goal: Discharge to home or other facility with appropriate resources  12/31/2023 2218 by Anish James RN  Outcome: Progressing  12/31/2023 1116 by Domenic Walton RN  Outcome: Progressing     Problem: Pain  Goal: Verbalizes/displays adequate comfort level or baseline comfort level  12/31/2023 2218 by Anish James RN  Outcome: Progressing  12/31/2023 1116 by Domenic Walton RN  Outcome: Progressing     Problem: Safety - Adult  Goal: Free from fall injury  12/31/2023 2218 by Anish James RN  Outcome: Progressing  12/31/2023 1116 by Domenic Walton RN  Outcome: Progressing     Problem: Respiratory - Adult  Goal: Achieves optimal ventilation and oxygenation  12/31/2023 2218 by Anish James RN  Outcome: Progressing  12/31/2023 2030 by Lupe Gonzales, RT  Outcome: Progressing  12/31/2023 1116 by Domenic Walton RN  Outcome: Progressing  12/31/2023 0827 by Rhina Gonzalez, RT  Outcome: Progressing

## 2024-01-02 LAB
ANION GAP SERPL CALC-SCNC: ABNORMAL MMOL/L (ref 5–15)
BASOPHILS # BLD: 0.1 K/UL (ref 0–0.1)
BASOPHILS NFR BLD: 1 % (ref 0–1)
BUN SERPL-MCNC: 3 MG/DL (ref 6–20)
BUN/CREAT SERPL: 5 (ref 12–20)
CALCIUM SERPL-MCNC: 8.7 MG/DL (ref 8.5–10.1)
CHLORIDE SERPL-SCNC: 108 MMOL/L (ref 97–108)
CO2 SERPL-SCNC: 31 MMOL/L (ref 21–32)
CREAT SERPL-MCNC: 0.56 MG/DL (ref 0.55–1.02)
DIFFERENTIAL METHOD BLD: ABNORMAL
EOSINOPHIL # BLD: 0.4 K/UL (ref 0–0.4)
EOSINOPHIL NFR BLD: 6 % (ref 0–7)
ERYTHROCYTE [DISTWIDTH] IN BLOOD BY AUTOMATED COUNT: 15.1 % (ref 11.5–14.5)
GLUCOSE SERPL-MCNC: 138 MG/DL (ref 65–100)
HCT VFR BLD AUTO: 26.3 % (ref 35–47)
HGB BLD-MCNC: 8.2 G/DL (ref 11.5–16)
IMM GRANULOCYTES # BLD AUTO: 0.1 K/UL (ref 0–0.04)
IMM GRANULOCYTES NFR BLD AUTO: 1 % (ref 0–0.5)
LYMPHOCYTES # BLD: 0.7 K/UL (ref 0.8–3.5)
LYMPHOCYTES NFR BLD: 11 % (ref 12–49)
MAGNESIUM SERPL-MCNC: 1.4 MG/DL (ref 1.6–2.4)
MCH RBC QN AUTO: 27.8 PG (ref 26–34)
MCHC RBC AUTO-ENTMCNC: 31.2 G/DL (ref 30–36.5)
MCV RBC AUTO: 89.2 FL (ref 80–99)
MONOCYTES # BLD: 0.7 K/UL (ref 0–1)
MONOCYTES NFR BLD: 11 % (ref 5–13)
NEUTS SEG # BLD: 4.4 K/UL (ref 1.8–8)
NEUTS SEG NFR BLD: 70 % (ref 32–75)
NRBC # BLD: 0.04 K/UL (ref 0–0.01)
NRBC BLD-RTO: 0.6 PER 100 WBC
PLATELET # BLD AUTO: 296 K/UL (ref 150–400)
PLATELET COMMENT: ABNORMAL
PMV BLD AUTO: 10.5 FL (ref 8.9–12.9)
POTASSIUM SERPL-SCNC: 3.2 MMOL/L (ref 3.5–5.1)
RBC # BLD AUTO: 2.95 M/UL (ref 3.8–5.2)
RBC MORPH BLD: ABNORMAL
RBC MORPH BLD: ABNORMAL
SODIUM SERPL-SCNC: 136 MMOL/L (ref 136–145)
WBC # BLD AUTO: 6.4 K/UL (ref 3.6–11)

## 2024-01-02 PROCEDURE — 6360000002 HC RX W HCPCS: Performed by: NURSE PRACTITIONER

## 2024-01-02 PROCEDURE — 2580000003 HC RX 258: Performed by: NURSE PRACTITIONER

## 2024-01-02 PROCEDURE — 1100000000 HC RM PRIVATE

## 2024-01-02 PROCEDURE — 94761 N-INVAS EAR/PLS OXIMETRY MLT: CPT

## 2024-01-02 PROCEDURE — 94640 AIRWAY INHALATION TREATMENT: CPT

## 2024-01-02 PROCEDURE — 85025 COMPLETE CBC W/AUTO DIFF WBC: CPT

## 2024-01-02 PROCEDURE — 36415 COLL VENOUS BLD VENIPUNCTURE: CPT

## 2024-01-02 PROCEDURE — 2580000003 HC RX 258: Performed by: STUDENT IN AN ORGANIZED HEALTH CARE EDUCATION/TRAINING PROGRAM

## 2024-01-02 PROCEDURE — 6370000000 HC RX 637 (ALT 250 FOR IP): Performed by: NURSE PRACTITIONER

## 2024-01-02 PROCEDURE — 6360000002 HC RX W HCPCS: Performed by: STUDENT IN AN ORGANIZED HEALTH CARE EDUCATION/TRAINING PROGRAM

## 2024-01-02 PROCEDURE — 80048 BASIC METABOLIC PNL TOTAL CA: CPT

## 2024-01-02 PROCEDURE — 83735 ASSAY OF MAGNESIUM: CPT

## 2024-01-02 PROCEDURE — C9113 INJ PANTOPRAZOLE SODIUM, VIA: HCPCS | Performed by: STUDENT IN AN ORGANIZED HEALTH CARE EDUCATION/TRAINING PROGRAM

## 2024-01-02 PROCEDURE — A4216 STERILE WATER/SALINE, 10 ML: HCPCS | Performed by: STUDENT IN AN ORGANIZED HEALTH CARE EDUCATION/TRAINING PROGRAM

## 2024-01-02 PROCEDURE — 6360000002 HC RX W HCPCS: Performed by: INTERNAL MEDICINE

## 2024-01-02 PROCEDURE — 99222 1ST HOSP IP/OBS MODERATE 55: CPT | Performed by: INTERNAL MEDICINE

## 2024-01-02 RX ORDER — MAGNESIUM SULFATE IN WATER 40 MG/ML
2000 INJECTION, SOLUTION INTRAVENOUS ONCE
Status: COMPLETED | OUTPATIENT
Start: 2024-01-02 | End: 2024-01-02

## 2024-01-02 RX ORDER — POTASSIUM CHLORIDE 20 MEQ/1
40 TABLET, EXTENDED RELEASE ORAL ONCE
Status: COMPLETED | OUTPATIENT
Start: 2024-01-02 | End: 2024-01-02

## 2024-01-02 RX ADMIN — ARFORMOTEROL TARTRATE 15 MCG: 15 SOLUTION RESPIRATORY (INHALATION) at 21:32

## 2024-01-02 RX ADMIN — MAGNESIUM SULFATE IN WATER FOR 2000 MG: 40 INJECTION INTRAVENOUS at 17:03

## 2024-01-02 RX ADMIN — ARFORMOTEROL TARTRATE 15 MCG: 15 SOLUTION RESPIRATORY (INHALATION) at 07:32

## 2024-01-02 RX ADMIN — POTASSIUM CHLORIDE 40 MEQ: 1500 TABLET, EXTENDED RELEASE ORAL at 15:18

## 2024-01-02 RX ADMIN — CYANOCOBALAMIN TAB 500 MCG 1000 MCG: 500 TAB at 09:02

## 2024-01-02 RX ADMIN — SULFAMETHOXAZOLE AND TRIMETHOPRIM 1 TABLET: 800; 160 TABLET ORAL at 21:59

## 2024-01-02 RX ADMIN — DULOXETINE HYDROCHLORIDE 90 MG: 30 CAPSULE, DELAYED RELEASE ORAL at 09:02

## 2024-01-02 RX ADMIN — BUDESONIDE 500 MCG: 0.5 INHALANT RESPIRATORY (INHALATION) at 21:32

## 2024-01-02 RX ADMIN — SODIUM CHLORIDE, PRESERVATIVE FREE 10 ML: 5 INJECTION INTRAVENOUS at 09:04

## 2024-01-02 RX ADMIN — SODIUM CHLORIDE, PRESERVATIVE FREE 10 ML: 5 INJECTION INTRAVENOUS at 22:00

## 2024-01-02 RX ADMIN — SODIUM CHLORIDE, PRESERVATIVE FREE 40 MG: 5 INJECTION INTRAVENOUS at 09:04

## 2024-01-02 RX ADMIN — SODIUM CHLORIDE, PRESERVATIVE FREE 40 MG: 5 INJECTION INTRAVENOUS at 21:59

## 2024-01-02 RX ADMIN — CALCIUM CARBONATE 3750 MG: 500 TABLET, CHEWABLE ORAL at 09:03

## 2024-01-02 RX ADMIN — BUDESONIDE 500 MCG: 0.5 INHALANT RESPIRATORY (INHALATION) at 07:32

## 2024-01-02 ASSESSMENT — PAIN SCALES - GENERAL: PAINLEVEL_OUTOF10: 0

## 2024-01-02 NOTE — CONSULTS
[facility]  at [facility]  [address]  [place] [address]  [phone]          Brief Consult Note      63/F with H/O Atrial Fib, SVT and acute PE in 2017.   Admitted with retroperitoneal bleed secondary to systemic anticoagulation.. Bleeding seems to have stabilized.   Off anticoagulation now.   Consult received and note to follow.       Signed by: Marquise Ferrer MD                     January 2, 2024      
[facility]  CONSULTATION    Name:  EJ HARLEY  MR#:  [id number]  :  1960  ACCOUNT #:  [id number]  DATE OF SERVICE:  2024      REASON FOR CONSULTATION:  Possible retroperitoneal bleed.    HISTORY OF PRESENT ILLNESS:  The patient is a 63-year-old female who was admitted on  2023  with severe epigastric abdominal pain that started on 2023.  She is since slowly improving with much more tolerable discomfort at this time.  Repeat CAT scan suggested possible areas of hemorrhage although the fluid buildup appears to be similar.  Currently, she is feeling much better than when she came in.  Hemoglobin is lower than her admission, but appears to be stable at 8.4.  She does not appear to have required any blood.  Currently, she is alert and oriented and able to give normal history.  She describes no recent injuries, illnesses, or surgeries.    PAST MEDICAL HISTORY:  Significant for morbid obesity status post open gastric bypass surgery in , prior gastric ulcers, history of pulmonary emboli on Eliquis with positive lupus anticoagulant, history of paroxysmal atrial fibrillation, history of COPD, morbid obesity, currently UTI, anxiety and depression.    CURRENT MEDICINES:  1.  Eliquis is on hold.  She reports only taking half a dose anyway.  2.  Albuterol inhalers.  3.  *** nebulizers.  4.  Tums.  5.  Cymbalta.  6.  Protonix.  7.  Ergocalciferol.  8.  Bactrim.  9.  Vitamin B12.    ALLERGIES:  SHE IS ALLERGIC TO PENICILLIN, GABAPENTIN, TRAMADOL, AND GRISEOFULVIN.    SOCIAL HISTORY:  Negative for tobacco and possible for occasional alcohol.    REVIEW OF SYSTEMS:  Negative for 10-system review other than what has been mentioned.  She has lowered appetite and mild epigastric pain.  No fevers, chest pain, shortness of breath, nausea, vomiting, diarrhea and no other complaints.    PHYSICAL EXAMINATION:  VITAL SIGNS:  Temperature is 97.9, heart rate 92, blood pressure is 
Vascular Surgery Consult  --full note dictated  --will discuss with partners or IR re: mesenteric angiogram this week  --does not need to npo now from my end  --please hold eliquis  
Est, Glom Filt Rate >60 >60 ml/min/1.73m2    Calcium 9.7 8.5 - 10.1 MG/DL    Total Bilirubin 0.4 0.2 - 1.0 MG/DL    ALT 29 12 - 78 U/L    AST 24 15 - 37 U/L    Alk Phosphatase 78 45 - 117 U/L    Total Protein 6.7 6.4 - 8.2 g/dL    Albumin 3.4 (L) 3.5 - 5.0 g/dL    Globulin 3.3 2.0 - 4.0 g/dL    Albumin/Globulin Ratio 1.0 (L) 1.1 - 2.2     Lipase    Collection Time: 12/28/23  4:25 PM   Result Value Ref Range    Lipase 59 13 - 75 U/L   Urinalysis with Reflex to Culture    Collection Time: 12/28/23 10:07 PM    Specimen: Urine   Result Value Ref Range    Color, UA DARK YELLOW      Appearance CLEAR CLEAR      Specific Gravity, UA 1.010 1.003 - 1.030      pH, Urine 5.5 5.0 - 8.0      Protein, UA 30 (A) NEG mg/dL    Glucose, UA Negative NEG mg/dL    Ketones, Urine 15 (A) NEG mg/dL    Bilirubin Urine Negative NEG      Blood, Urine Negative NEG      Urobilinogen, Urine 1.0 0.2 - 1.0 EU/dL    Nitrite, Urine Negative NEG      Leukocyte Esterase, Urine Negative NEG      WBC, UA 0-4 0 - 4 /hpf    RBC, UA 0-5 0 - 5 /hpf    Epithelial Cells UA FEW FEW /lpf    BACTERIA, URINE Negative NEG /hpf    Urine Culture if Indicated CULTURE NOT INDICATED BY UA RESULT CNI     Amylase    Collection Time: 12/29/23  6:52 AM   Result Value Ref Range    Amylase 16 (L) 25 - 115 U/L   Lipase    Collection Time: 12/29/23  6:52 AM   Result Value Ref Range    Lipase 17 13 - 75 U/L   Protime-INR    Collection Time: 12/29/23  6:55 AM   Result Value Ref Range    INR 1.1 0.9 - 1.1      Protime 11.2 (H) 9.0 - 11.1 sec       IMAGING RESULTS:  I have personally reviewed the imaging reports      Total time spent with patient: 15 minutes ________________________________________________________________________  Care Plan discussed with:  Patient Y   Family     RN               Consultant:       ________________________________________________________________________    ___________________________________________________  Consulting Provider: Ben Chow PA-C

## 2024-01-02 NOTE — CARE COORDINATION
Care Management Initial Assessment       RUR: 12%  Readmission? No  1st IM letter given? Yes - 12/29/23  1st  letter given: No        CM introduce self, explain role and confirmed demographics with pt.    Pt's son's number is [phone], Lincoln Eller.     Pt lives with a roommate in an one story home with three steps to enter.    Pt has a hx of home health, [facility].    No hx of inpatient rehab or SNF.    Pt uses the [facility] in [place].    At the time of d/c pt's family/friend will transport.    CM will follow and assist with d/c planning.       01/02/24 5255   Service Assessment   Patient Orientation Alert and Oriented   Cognition Alert   History Provided By Patient   Primary Caregiver Self   Support Systems Children;Family Members;Friends/Neighbors   Patient's Healthcare Decision Maker is: Legal Next of Kin  (Pt's son-Lincoln Eller)   PCP Verified by CM Yes   Last Visit to PCP Within last 3 months   Prior Functional Level Independent in ADLs/IADLs   Current Functional Level Independent in ADLs/IADLs   Can patient return to prior living arrangement Yes   Family able to assist with home care needs: No   Would you like for me to discuss the discharge plan with any other family members/significant others, and if so, who? No   Financial Resources Medicare   Community Resources None   Social/Functional History   Lives With Other (comment)  (Roommate)   Type of Home House   Home Equipment Cane;Walker, rolling;Grab bars   Active  Yes   Discharge Planning   Type of Residence House   Current Services Prior To Admission None   Patient expects to be discharged to: House     Advance Care Planning     General Advance Care Planning (ACP) Conversation    Date of Conversation: 12/28/2023  Conducted with: Patient with Decision Making Capacity    Healthcare Decision Maker:  No healthcare decision makers have been documented.  Click here to complete HealthCare Decision Makers including selection of the

## 2024-01-03 LAB
ANA TITR SER IF: NEGATIVE
ANION GAP SERPL CALC-SCNC: 5 MMOL/L (ref 5–15)
BASOPHILS # BLD: 0.1 K/UL (ref 0–0.1)
BASOPHILS NFR BLD: 1 % (ref 0–1)
BUN SERPL-MCNC: 3 MG/DL (ref 6–20)
BUN/CREAT SERPL: 6 (ref 12–20)
CALCIUM SERPL-MCNC: 8.6 MG/DL (ref 8.5–10.1)
CHLORIDE SERPL-SCNC: 109 MMOL/L (ref 97–108)
CO2 SERPL-SCNC: 28 MMOL/L (ref 21–32)
CREAT SERPL-MCNC: 0.53 MG/DL (ref 0.55–1.02)
DIFFERENTIAL METHOD BLD: ABNORMAL
EOSINOPHIL # BLD: 0.5 K/UL (ref 0–0.4)
EOSINOPHIL NFR BLD: 7 % (ref 0–7)
ERYTHROCYTE [DISTWIDTH] IN BLOOD BY AUTOMATED COUNT: 15.7 % (ref 11.5–14.5)
GLUCOSE SERPL-MCNC: 92 MG/DL (ref 65–100)
HCT VFR BLD AUTO: 27.2 % (ref 35–47)
HEMOCCULT STL QL: NEGATIVE
HGB BLD-MCNC: 8.3 G/DL (ref 11.5–16)
IGG4 SER-MCNC: 21 MG/DL (ref 2–96)
IMM GRANULOCYTES # BLD AUTO: 0.1 K/UL (ref 0–0.04)
IMM GRANULOCYTES NFR BLD AUTO: 1 % (ref 0–0.5)
LABORATORY COMMENT REPORT: NORMAL
LYMPHOCYTES # BLD: 1 K/UL (ref 0.8–3.5)
LYMPHOCYTES NFR BLD: 14 % (ref 12–49)
MAGNESIUM SERPL-MCNC: 2 MG/DL (ref 1.6–2.4)
MCH RBC QN AUTO: 27.3 PG (ref 26–34)
MCHC RBC AUTO-ENTMCNC: 30.5 G/DL (ref 30–36.5)
MCV RBC AUTO: 89.5 FL (ref 80–99)
MONOCYTES # BLD: 1 K/UL (ref 0–1)
MONOCYTES NFR BLD: 13 % (ref 5–13)
NEUTS SEG # BLD: 4.8 K/UL (ref 1.8–8)
NEUTS SEG NFR BLD: 64 % (ref 32–75)
NRBC # BLD: 0.04 K/UL (ref 0–0.01)
NRBC BLD-RTO: 0.5 PER 100 WBC
PLATELET # BLD AUTO: 333 K/UL (ref 150–400)
PMV BLD AUTO: 10.7 FL (ref 8.9–12.9)
POTASSIUM SERPL-SCNC: 3.9 MMOL/L (ref 3.5–5.1)
RBC # BLD AUTO: 3.04 M/UL (ref 3.8–5.2)
SODIUM SERPL-SCNC: 142 MMOL/L (ref 136–145)
WBC # BLD AUTO: 7.4 K/UL (ref 3.6–11)

## 2024-01-03 PROCEDURE — 82272 OCCULT BLD FECES 1-3 TESTS: CPT

## 2024-01-03 PROCEDURE — 2580000003 HC RX 258: Performed by: NURSE PRACTITIONER

## 2024-01-03 PROCEDURE — 1100000000 HC RM PRIVATE

## 2024-01-03 PROCEDURE — 94640 AIRWAY INHALATION TREATMENT: CPT

## 2024-01-03 PROCEDURE — C9113 INJ PANTOPRAZOLE SODIUM, VIA: HCPCS | Performed by: STUDENT IN AN ORGANIZED HEALTH CARE EDUCATION/TRAINING PROGRAM

## 2024-01-03 PROCEDURE — A4216 STERILE WATER/SALINE, 10 ML: HCPCS | Performed by: STUDENT IN AN ORGANIZED HEALTH CARE EDUCATION/TRAINING PROGRAM

## 2024-01-03 PROCEDURE — 2580000003 HC RX 258: Performed by: STUDENT IN AN ORGANIZED HEALTH CARE EDUCATION/TRAINING PROGRAM

## 2024-01-03 PROCEDURE — 6360000002 HC RX W HCPCS: Performed by: STUDENT IN AN ORGANIZED HEALTH CARE EDUCATION/TRAINING PROGRAM

## 2024-01-03 PROCEDURE — 6370000000 HC RX 637 (ALT 250 FOR IP): Performed by: NURSE PRACTITIONER

## 2024-01-03 PROCEDURE — 36415 COLL VENOUS BLD VENIPUNCTURE: CPT

## 2024-01-03 PROCEDURE — 80048 BASIC METABOLIC PNL TOTAL CA: CPT

## 2024-01-03 PROCEDURE — 6360000002 HC RX W HCPCS: Performed by: INTERNAL MEDICINE

## 2024-01-03 PROCEDURE — 85025 COMPLETE CBC W/AUTO DIFF WBC: CPT

## 2024-01-03 PROCEDURE — 94761 N-INVAS EAR/PLS OXIMETRY MLT: CPT

## 2024-01-03 PROCEDURE — 83735 ASSAY OF MAGNESIUM: CPT

## 2024-01-03 RX ADMIN — DULOXETINE HYDROCHLORIDE 90 MG: 30 CAPSULE, DELAYED RELEASE ORAL at 11:05

## 2024-01-03 RX ADMIN — ARFORMOTEROL TARTRATE 15 MCG: 15 SOLUTION RESPIRATORY (INHALATION) at 19:45

## 2024-01-03 RX ADMIN — ARFORMOTEROL TARTRATE 15 MCG: 15 SOLUTION RESPIRATORY (INHALATION) at 09:45

## 2024-01-03 RX ADMIN — SULFAMETHOXAZOLE AND TRIMETHOPRIM 1 TABLET: 800; 160 TABLET ORAL at 22:52

## 2024-01-03 RX ADMIN — MELATONIN 6 MG: at 00:12

## 2024-01-03 RX ADMIN — ONDANSETRON 4 MG: 4 TABLET, ORALLY DISINTEGRATING ORAL at 00:12

## 2024-01-03 RX ADMIN — SODIUM CHLORIDE, PRESERVATIVE FREE 10 ML: 5 INJECTION INTRAVENOUS at 22:55

## 2024-01-03 RX ADMIN — BUDESONIDE 500 MCG: 0.5 INHALANT RESPIRATORY (INHALATION) at 09:45

## 2024-01-03 RX ADMIN — SODIUM CHLORIDE, PRESERVATIVE FREE 40 MG: 5 INJECTION INTRAVENOUS at 22:54

## 2024-01-03 RX ADMIN — CALCIUM CARBONATE 3750 MG: 500 TABLET, CHEWABLE ORAL at 11:08

## 2024-01-03 RX ADMIN — SODIUM CHLORIDE, PRESERVATIVE FREE 40 MG: 5 INJECTION INTRAVENOUS at 11:07

## 2024-01-03 RX ADMIN — BUDESONIDE 500 MCG: 0.5 INHALANT RESPIRATORY (INHALATION) at 19:45

## 2024-01-03 RX ADMIN — SODIUM CHLORIDE, PRESERVATIVE FREE 10 ML: 5 INJECTION INTRAVENOUS at 11:07

## 2024-01-03 RX ADMIN — ALPRAZOLAM 0.5 MG: 0.5 TABLET ORAL at 00:12

## 2024-01-03 RX ADMIN — CYANOCOBALAMIN TAB 500 MCG 1000 MCG: 500 TAB at 11:06

## 2024-01-03 ASSESSMENT — PAIN SCALES - GENERAL
PAINLEVEL_OUTOF10: 0

## 2024-01-03 NOTE — ACP (ADVANCE CARE PLANNING)
Advance Care Planning     Advance Care Planning Inpatient Note  [facility] Department    Today's Date: 1/3/2024  Unit: [facility] 1 CLINICAL OBS    Received request from IDT Member.  Upon review of chart and communication with care team, patient's decision making abilities are not in question.. Patient was/were present in the room, but was sleeping.     Goals of ACP Conversation:      Health Care Decision Makers:     No healthcare decision makers have been documented.  Click here to complete HealthCare Decision Makers including selection of the Healthcare Decision Maker Relationship (ie \"Primary\")''''       Summary:  No Decision Maker named by patient at this time    Advance Care Planning Documents (Patient Wishes):  None     Assessment:  Received request from IDT Member.  Upon review of chart and communication with care team, patient's decision making abilities are not in question.. Patient was/were present in the room, but was sleeping. Left copy of advance medical directive and booklet \"Your Right to Decide\" along with a note to patient advising her to have nurse page a  if she wishes to have a  return.          Interventions:  Left paperwork and \"Your Right to Decide\" booklet     Care Preferences Communicated:   No    Outcomes/Plan:  ACP Discussion: Postponed    Electronically signed by Chaplain Godwin, [facility]  on 1/3/2024 at 10:58 AM

## 2024-01-03 NOTE — PLAN OF CARE
Problem: Discharge Planning  Goal: Discharge to home or other facility with appropriate resources  Outcome: Progressing     Problem: Pain  Goal: Verbalizes/displays adequate comfort level or baseline comfort level  Outcome: Progressing     Problem: Safety - Adult  Goal: Free from fall injury  Outcome: Progressing     Problem: Respiratory - Adult  Goal: Achieves optimal ventilation and oxygenation  1/3/2024 0701 by Leann Pennington RN  Outcome: Progressing  1/2/2024 2134 by Lupe Gonzales, RT  Outcome: Progressing

## 2024-01-04 VITALS
HEART RATE: 85 BPM | OXYGEN SATURATION: 95 % | DIASTOLIC BLOOD PRESSURE: 62 MMHG | SYSTOLIC BLOOD PRESSURE: 101 MMHG | BODY MASS INDEX: 38.57 KG/M2 | TEMPERATURE: 98.6 F | HEIGHT: 66 IN | RESPIRATION RATE: 18 BRPM | WEIGHT: 240 LBS

## 2024-01-04 LAB
ANION GAP SERPL CALC-SCNC: 5 MMOL/L (ref 5–15)
BASOPHILS # BLD: 0.1 K/UL (ref 0–0.1)
BASOPHILS NFR BLD: 1 % (ref 0–1)
BUN SERPL-MCNC: 4 MG/DL (ref 6–20)
BUN/CREAT SERPL: 6 (ref 12–20)
CALCIUM SERPL-MCNC: 8.8 MG/DL (ref 8.5–10.1)
CHLORIDE SERPL-SCNC: 109 MMOL/L (ref 97–108)
CO2 SERPL-SCNC: 26 MMOL/L (ref 21–32)
CREAT SERPL-MCNC: 0.68 MG/DL (ref 0.55–1.02)
DIFFERENTIAL METHOD BLD: ABNORMAL
EOSINOPHIL # BLD: 0.6 K/UL (ref 0–0.4)
EOSINOPHIL NFR BLD: 8 % (ref 0–7)
ERYTHROCYTE [DISTWIDTH] IN BLOOD BY AUTOMATED COUNT: 15.9 % (ref 11.5–14.5)
GLUCOSE SERPL-MCNC: 95 MG/DL (ref 65–100)
HCT VFR BLD AUTO: 30.7 % (ref 35–47)
HGB BLD-MCNC: 9.4 G/DL (ref 11.5–16)
IMM GRANULOCYTES # BLD AUTO: 0.1 K/UL (ref 0–0.04)
IMM GRANULOCYTES NFR BLD AUTO: 1 % (ref 0–0.5)
LYMPHOCYTES # BLD: 1.5 K/UL (ref 0.8–3.5)
LYMPHOCYTES NFR BLD: 21 % (ref 12–49)
MCH RBC QN AUTO: 27.3 PG (ref 26–34)
MCHC RBC AUTO-ENTMCNC: 30.6 G/DL (ref 30–36.5)
MCV RBC AUTO: 89.2 FL (ref 80–99)
MONOCYTES # BLD: 0.9 K/UL (ref 0–1)
MONOCYTES NFR BLD: 13 % (ref 5–13)
NEUTS SEG # BLD: 3.8 K/UL (ref 1.8–8)
NEUTS SEG NFR BLD: 56 % (ref 32–75)
NRBC # BLD: 0.04 K/UL (ref 0–0.01)
NRBC BLD-RTO: 0.6 PER 100 WBC
PLATELET # BLD AUTO: 373 K/UL (ref 150–400)
PMV BLD AUTO: 10.3 FL (ref 8.9–12.9)
POTASSIUM SERPL-SCNC: 3.9 MMOL/L (ref 3.5–5.1)
RBC # BLD AUTO: 3.44 M/UL (ref 3.8–5.2)
SODIUM SERPL-SCNC: 140 MMOL/L (ref 136–145)
WBC # BLD AUTO: 6.8 K/UL (ref 3.6–11)

## 2024-01-04 PROCEDURE — 6370000000 HC RX 637 (ALT 250 FOR IP): Performed by: NURSE PRACTITIONER

## 2024-01-04 PROCEDURE — 6360000002 HC RX W HCPCS: Performed by: NURSE PRACTITIONER

## 2024-01-04 PROCEDURE — 2580000003 HC RX 258: Performed by: STUDENT IN AN ORGANIZED HEALTH CARE EDUCATION/TRAINING PROGRAM

## 2024-01-04 PROCEDURE — 97116 GAIT TRAINING THERAPY: CPT | Performed by: PHYSICAL THERAPIST

## 2024-01-04 PROCEDURE — 97161 PT EVAL LOW COMPLEX 20 MIN: CPT | Performed by: PHYSICAL THERAPIST

## 2024-01-04 PROCEDURE — 85025 COMPLETE CBC W/AUTO DIFF WBC: CPT

## 2024-01-04 PROCEDURE — 6360000002 HC RX W HCPCS: Performed by: STUDENT IN AN ORGANIZED HEALTH CARE EDUCATION/TRAINING PROGRAM

## 2024-01-04 PROCEDURE — 36415 COLL VENOUS BLD VENIPUNCTURE: CPT

## 2024-01-04 PROCEDURE — 80048 BASIC METABOLIC PNL TOTAL CA: CPT

## 2024-01-04 PROCEDURE — C9113 INJ PANTOPRAZOLE SODIUM, VIA: HCPCS | Performed by: STUDENT IN AN ORGANIZED HEALTH CARE EDUCATION/TRAINING PROGRAM

## 2024-01-04 PROCEDURE — 94640 AIRWAY INHALATION TREATMENT: CPT

## 2024-01-04 PROCEDURE — 94761 N-INVAS EAR/PLS OXIMETRY MLT: CPT

## 2024-01-04 PROCEDURE — 6360000002 HC RX W HCPCS: Performed by: INTERNAL MEDICINE

## 2024-01-04 PROCEDURE — 2580000003 HC RX 258: Performed by: NURSE PRACTITIONER

## 2024-01-04 PROCEDURE — A4216 STERILE WATER/SALINE, 10 ML: HCPCS | Performed by: STUDENT IN AN ORGANIZED HEALTH CARE EDUCATION/TRAINING PROGRAM

## 2024-01-04 RX ORDER — OMEPRAZOLE 20 MG/1
40 CAPSULE, DELAYED RELEASE ORAL DAILY
Qty: 30 CAPSULE | Refills: 0 | Status: SHIPPED | OUTPATIENT
Start: 2024-01-04

## 2024-01-04 RX ADMIN — ARFORMOTEROL TARTRATE 15 MCG: 15 SOLUTION RESPIRATORY (INHALATION) at 09:10

## 2024-01-04 RX ADMIN — BUDESONIDE 500 MCG: 0.5 INHALANT RESPIRATORY (INHALATION) at 09:10

## 2024-01-04 RX ADMIN — ONDANSETRON 4 MG: 2 INJECTION INTRAMUSCULAR; INTRAVENOUS at 00:55

## 2024-01-04 RX ADMIN — ALPRAZOLAM 0.5 MG: 0.5 TABLET ORAL at 00:53

## 2024-01-04 RX ADMIN — DULOXETINE HYDROCHLORIDE 90 MG: 30 CAPSULE, DELAYED RELEASE ORAL at 09:42

## 2024-01-04 RX ADMIN — SODIUM CHLORIDE, PRESERVATIVE FREE 10 ML: 5 INJECTION INTRAVENOUS at 09:47

## 2024-01-04 RX ADMIN — CALCIUM CARBONATE 3750 MG: 500 TABLET, CHEWABLE ORAL at 09:42

## 2024-01-04 RX ADMIN — CYANOCOBALAMIN TAB 500 MCG 1000 MCG: 500 TAB at 09:44

## 2024-01-04 RX ADMIN — SODIUM CHLORIDE, PRESERVATIVE FREE 40 MG: 5 INJECTION INTRAVENOUS at 09:37

## 2024-01-04 NOTE — PLAN OF CARE
Problem: Discharge Planning  Goal: Discharge to home or other facility with appropriate resources  Outcome: Adequate for Discharge     Problem: Pain  Goal: Verbalizes/displays adequate comfort level or baseline comfort level  Outcome: Adequate for Discharge     Problem: Safety - Adult  Goal: Free from fall injury  Outcome: Adequate for Discharge     Problem: Respiratory - Adult  Goal: Achieves optimal ventilation and oxygenation  1/4/2024 1640 by Andrew Thorpe RN  Outcome: Adequate for Discharge  1/4/2024 1029 by Chantal Moura, RT  Outcome: Progressing     Problem: Physical Therapy - Adult  Goal: By Discharge: Performs mobility at highest level of function for planned discharge setting.  See evaluation for individualized goals.  1/4/2024 1421 by Magy Alejo, PT  Outcome: Progressing

## 2024-01-04 NOTE — CARE COORDINATION
Pt is clear from CM standpoint for d/c.    Pt's friend is in room to transport pt.      Transition of Care Plan:    RUR: 8%  Prior Level of Functioning: Independent   Disposition: Home with no services at this time   If SNF or IPR: Date FOC offered:   Date FOC received:   Accepting facility:   Date authorization started with reference number:   Date authorization received and expires:   Follow up appointments: PCP  DME needed: No DME needed   Transportation at discharge: Pt's friend is in room to transport pt  IM/IMM Medicare/ letter given: 1/4/24  Is patient a  and connected with VA? No   If yes, was [place] transfer form completed and [facility] notified? No  Caregiver Contact:   Discharge Caregiver contacted prior to discharge? Pt was contacted   Care Conference needed? No  Barriers to discharge:        01/04/24 1327   Services At/After Discharge   Transition of Care Consult (CM Consult) N/A   Services At/After Discharge None   [place] Resource Information Provided? No   Mode of Transport at Discharge Self   Confirm Follow Up Transport Self   Condition of Participation: Discharge Planning   The Plan for Transition of Care is related to the following treatment goals: Home with no services at the time of d/c   The Patient and/or Patient Representative was provided with a Choice of Provider? Patient   The Patient and/Or Patient Representative agree with the Discharge Plan? Yes   Freedom of Choice list was provided with basic dialogue that supports the patient's individualized plan of care/goals, treatment preferences, and shares the quality data associated with the providers?  Yes     Lupe Clay

## 2024-01-04 NOTE — PLAN OF CARE
Problem: Physical Therapy - Adult  Goal: By Discharge: Performs mobility at highest level of function for planned discharge setting.  See evaluation for individualized goals.  Outcome: Progressing   PHYSICAL THERAPY EVALUATION/DISCHARGE    Patient: Marcelina Topete (63 y.o. female)  Date: 1/4/2024  Primary Diagnosis: Acute pancreatitis, unspecified complication status, unspecified pancreatitis type [K85.90]  Acute pancreatitis without infection or necrosis, unspecified pancreatitis type [K85.90]       Precautions:                      ASSESSMENT AND RECOMMENDATIONS:  Based on the objective data below, the patient presents with good strength, balance, and mobility skills.  Patient does have pain in B hips, but this is longstanding.  Patient performs all mobility with supervision and is able to ambulate 110' with no assistive device.  Patient feels she is at baseline. No further PT recommended at this time.    Functional Outcome Measure:  The patient scored 21/24 on the [facility] outcome measure which is indicative of likely not requiring further PT at discharge..          Further skilled acute physical therapy is not indicated at this time.       PLAN :  Recommendation for discharge: (in order for the patient to meet his/her long term goals): No skilled physical therapy    Other factors to consider for discharge: no additional factors    IF patient discharges home will need the following DME: patient owns DME required for discharge       SUBJECTIVE:   Patient stated “I feel I'm doing okay.”    OBJECTIVE DATA SUMMARY:     Past Medical History:   Diagnosis Date    Anemia 3/15/2013    Arrhythmia     paroxsimal afib    Asthma 3/15/2013    Asthma     Back disorder     disc problem    Chronic obstructive pulmonary disease (HCC)     Menopause     Morbid obesity (HCC) 3/15/2013     Past Surgical History:   Procedure Laterality Date    ADENOIDECTOMY      APPENDECTOMY      GASTRIC BYPASS SURGERY  1-15-98    Dr. Mitchell    GASTRIC

## 2024-01-04 NOTE — DISCHARGE SUMMARY
Discharge Summary    Name: Marcelina Topete  [id number]  YOB: 1960 (Age: 63 y.o.)   Date of Admission: 12/28/2023  Date of Discharge: 1/4/2024  Attending Physician: Mae Rosenthal MD    Discharge Diagnosis:     Acute pancreatitis (ruled out)          History of gastric bypass surgery          History of gastric ulcers          Mild leukocytosis     Retroperitoneal bleed     Paroxysmal atrial fibrillation        History of pulmonary embolism in 2017        History of lupus anticoagulant     History of COPD     History of chronic UTIs     History of anxiety and depression     Anemia    Consultations:  IP CONSULT TO HOSPITALIST  IP CONSULT TO GI  IP CONSULT TO VASCULAR SURGERY  IP CONSULT TO ONCOLOGY  IP CONSULT TO SPIRITUAL CARE      Brief Admission History/Reason for Admission Per Isidoro Elena Jr., MD:     Marcelina Topete is a 63 y.o.  female with PMHx significant for COPD, asthma, pulmonary embolism, chronic UTI who presented to the ED on Thursday with chief complaint diffuse abdominal pain.  She stated her pain started on Terence Day and progressively worsened by Thursday.  Initially, her abdominal pain felt like the pain she experienced when she had an ulcer.  It started out as transverse upper abdominal pain lasting for approximately 5 minutes and then when the pain returned it was located in the center of the abdomen and radiated to her back.  She reported that she has a severely high pain tolerance but this pain was unlike anything she has ever felt in her life.  She also reported that she has not eaten in the past 2 days and has been experiencing dry heaving.  She ingested some old left over Carafate (6 year old) and Mylanta with very minimal relief.  She called her grandson to drive her to the hospital, but in route she became profusely diaphoretic, began writhing in pain, started screaming and told her grandson to pull over and call EMS.  EMS arrived and

## 2024-01-05 LAB
BACTERIA SPEC CULT: NORMAL
BACTERIA SPEC CULT: NORMAL
SERVICE CMNT-IMP: NORMAL
SERVICE CMNT-IMP: NORMAL

## 2024-01-06 NOTE — PROGRESS NOTES
Gastroenterology Daily Progress Note (Dr. Dinh for Dr. Guo)   [facility]    Admit Date: 12/28/2023     F/U visit for pancreatitis.  Subjective:       Still reports abdominal pain in epigastrim.  She is about to go to CT scan for repeat study.  No fevers,no melena reported.    She last had EGD 20 years ago (Gastric bypass 1998)    Current Facility-Administered Medications   Medication Dose Route Frequency    sodium chloride flush 0.9 % injection 5-40 mL  5-40 mL IntraVENous 2 times per day    sodium chloride flush 0.9 % injection 5-40 mL  5-40 mL IntraVENous PRN    0.9 % sodium chloride infusion   IntraVENous PRN    enoxaparin Sodium (LOVENOX) injection 30 mg  30 mg SubCUTAneous BID    ondansetron (ZOFRAN-ODT) disintegrating tablet 4 mg  4 mg Oral Q8H PRN    Or    ondansetron (ZOFRAN) injection 4 mg  4 mg IntraVENous Q6H PRN    polyethylene glycol (GLYCOLAX) packet 17 g  17 g Oral Daily PRN    acetaminophen (TYLENOL) tablet 650 mg  650 mg Oral Q6H PRN    Or    acetaminophen (TYLENOL) suppository 650 mg  650 mg Rectal Q6H PRN    melatonin tablet 6 mg  6 mg Oral Nightly PRN    HYDROmorphone HCl PF (DILAUDID) injection 1 mg  1 mg IntraVENous Q3H PRN    albuterol (PROVENTIL) (2.5 MG/3ML) 0.083% nebulizer solution 2.5 mg  2.5 mg Nebulization Once    albuterol (PROVENTIL) (2.5 MG/3ML) 0.083% nebulizer solution 2.5 mg  2.5 mg Nebulization Q4H PRN    ALPRAZolam (XANAX) tablet 0.5 mg  0.5 mg Oral Nightly PRN    [Held by provider] apixaban (ELIQUIS) tablet 5 mg  5 mg Oral BID    calcium carbonate (TUMS) chewable tablet 3,750 mg  3,750 mg Oral Daily    vitamin B-12 (CYANOCOBALAMIN) tablet 1,000 mcg  1,000 mcg Oral Daily    DULoxetine (CYMBALTA) extended release capsule 90 mg  90 mg Oral Daily    ergocalciferol capsule 50,000 Units  50,000 Units Oral Q7 Days    arformoterol tartrate (BROVANA) nebulizer solution 15 mcg  15 mcg Nebulization BID RT    And    budesonide (PULMICORT) nebulizer 
        Hospitalist Progress Note    NAME:   Marcelina Topete   : 1960   MRN: [id number]     Date/Time: 2024 01:43 PM  Patient PCP: Elsa Pandey APRN - CARINE    Estimated discharge date:   Barriers: GI and vascular clearance, symptom improvement      Assessment / Plan:    Acute pancreatitis (ruled out)        History of gastric bypass surgery        History of gastric ulcers        Mild leukocytosis  - etiology of leukocytosis likely reactive  - blood cultures in process  - lactic and lipid panel normal  - TSH normal  - CT of abdomen on 23 shows:  Peripancreatic inflammatory stranding, fluid and soft tissue.   Right paracolic and pelvic fluid  - hold eliquis  - antiemetics,  pain medication  - PPI  q12  - continue IV hydraion  - strict I&O  - GI following, recommends Vascular surgery consult for further guidance    2. Retroperitoneal bleed  CT abdomen: 1. There are 2 high density areas as described above measuring 62 and 68  Hounsfield units suspicious for hemorrhage. There is a high density area  adjacent to the left adrenal gland and there is a high density area adjacent  second portion of the duodenum which extends into the small bowel mesentery  around the SMV suspicious for areas of hemorrhage. Exact etiology is uncertain  but these findings appear stable since 2023.. The pancreas appears  relatively normal.Results called by myself  There is a small amount of complex fluid in the cul-de-sac.  Patient is status post gastric bypass and cholecystectomy.  - Vascular surgery consulted, greatly appreciate expertise  - plan noted for mesenteric angiogram this week  - okay to have diet per Vascular surgery, started on full liquid    3. Paroxysmal atrial fibrillation      History of pulmonary embolism in 2017  - telemetry monitoring  - hold eliquis     4. History of COPD  -not in acute exacerbation  - keep 02 sat greater than 90%  - incentive spirometry  - turn, cough and deep breathe  - 
        Hospitalist Progress Note    NAME:   Marcelina Topete   : 1960   MRN: [id number]     Date/Time: 2023 09:56 AM  Patient PCP: Elsa Pandey APRN - NP    Estimated discharge date: 24  Barriers: GI clearance, planned EGD , Vascular clearance, clinical stability and symptom improvement    Update as or 1300: CT abdomen showed retroperitoneal bleed. Vascular consult ordered per GI recommendation. DVT prophylaxis switched to SCD from Lovenox. Previously was on Eliquis for paroxysmal Afib    Assessment / Plan:    Acute pancreatitis        History of gastric bypass surgery        History of gastric ulcers        Mild leukocytosis  - etiology of leukocytosis likely reactive  - blood cultures in process  - lactic and lipid panel normal  - TSH normal  - CT of abdomen on 23 shows:  Peripancreatic inflammatory stranding, fluid and soft tissue.   Right paracolic and pelvic fluid  - hold eliquis  - antiemetics,  pain medication  - NPO, diet advancement per GI  - PPI  q12  - continue IV hydraion  - strict I&O  - GI following, planned EGD to r/o PUD      2. Paroxysmal atrial fibrillation      History of pulmonary embolism in 2017  - telemetry monitoring  - hold eliquis  - continue Lovenox     3. History of COPD  -not in acute exacerbation  - keep 02 sat greater than 90%  - incentive spirometry  - turn, cough and deep breathe  - continue scheduled and prn nebulizer treatment     4. History of chronic UTIs  - urinalysis on 23 shows ketones 15, protein 30, negative bacteria, nitrites and leukocytes.  - strict I&O     5. History of anxiety and depression  - continue cymbalta     6. Anemia  -Hgb 8.6 today, was 9.3 yesterday, Iron 13, TIBC 354  - no active bleeding noted  - transfuse for Hgb <7          Medical Decision Making:   I personally reviewed labs: BMP, CBC, LFT  I personally reviewed imaging: CT abdomen  I personally reviewed EKG: NSR, non ischemic  Toxic drug monitoring:   Discussed 
        Hospitalist Progress Note    NAME:   Marcelina Topete   : 1960   MRN: [id number]     Date/Time: 2023 8:50 AM  Patient PCP: Elsa Pandey APRN - NP    Estimated discharge date:   Barriers: planned EGD , symptom improvement      Assessment / Plan:    Acute pancreatitis        History of gastric bypass surgery        History of gastric ulcers        Mild leukocytosis  - etiology of leukocytosis likely reactive  - blood cultures in process  - lactic and lipid panel normal  - TSH normal  - CT of abdomen on 23 shows:  Peripancreatic inflammatory stranding, fluid and soft tissue.   Right paracolic and pelvic fluid  - hold eliquis  - antiemetics,  pain medication  - NPO, diet advancement per GI  - PPI  q12  - IVF  - strict I&O  - GI following, planned EGD to r/o PUD      2. Paroxysmal atrial fibrillation      History of pulmonary embolism in 2017  - telemetry monitoring  - hold eliquis  - continue Lovenox     3. History of COPD  -not in acute exacerbation  - keep 02 sat greater than 90%  - incentive spirometry  - turn, cough and deep breathe  - continue scheduled and prn nebulizer treatment     4. History of chronic UTIs  - urinalysis on 23 shows ketones 15, protein 30, negative bacteria, nitrites and leukocytes.  - strict I&O     5. History of anxiety and depression  - continue cymbalta     6. Anemia  -Hgb 8.6 today, was 9.3 yesterday, Iron 13, TIBC 354  - no active bleeding noted  - transfuse for Hgb <7          Medical Decision Making:   I personally reviewed labs: BMP, CBC  I personally reviewed imaging: none  I personally reviewed EKG: SR, non ischemic  Toxic drug monitoring:   Discussed case with: attending        Code Status: Full  DVT Prophylaxis: SCD  GI Prophylaxis: Protonix    Subjective:     Chief Complaint / Reason for Physician Visit    \"I have no BM since last Thursday evening, abdominal pain is about 3-4\".     Currently NPO.    Reports episode of wretching on 
      Hospitalist Progress Note    NAME:   Marcelina Topete   : 1960   MRN: [id number]     Date/Time: 2023 9:35 PM  Patient PCP: Elsa Pandey APRN - NP    Estimated discharge date:  greater than 24 hours  Barriers: Clinical stability. GI following. CM to follow for discharge planning.    Assessment / Plan:  Acute pancreatitis        History of gastric bypass surgery        History of gastric ulcers        Mild leukocytosis  - etiology of leukocytosis likely reactive  - blood cultures in process  - lactic and lipid panel normal  - TSH normal  - CT of abdomen on 23 shows:  Peripancreatic inflammatory stranding, fluid and soft tissue.   Right paracolic and pelvic fluid  - hold eliquis  - antiemetics,  pain medication  - NPO  - IVF  - can trial clears as patient feels able  - strict I&O  - GI following    2. Paroxysmal atrial fibrillation      History of pulmonary embolism in 2017  - telemetry monitoring  - hold eliquis  - continue Lovenox    3. History of COPD  -not in acute exacerbation  - keep 02 sat greater than 90%  - incentive spirometry  - turn, cough and deep breathe  - continue scheduled and prn nebulizer treatment    4. History of chronic UTIs  - urinalysis on 23 shows ketones 15, protein 30, negative bacteria, nitrites and leukocytes.  - strict I&O    5. History of anxiety and depression  - continue cymbalta    6. Anemia  -Hgb 9.3  - no active bleeding noted  - transfuse for Hgb <7  - iron studies ordered    Medical Decision Making:   I personally reviewed labs: yes  I personally reviewed imaging: yes  I personally reviewed EKG: yes  Toxic drug monitoring: Lovenox, H/H  Discussed case with: patient, nursing, CM, Dr. ARIAN Morfin    Code Status: Full Code  DVT Prophylaxis:  Lovenox  GI Prophylaxis: protonix    Subjective:     Chief Complaint / Reason for Physician Visit  \" My stomach hurts\"     Marcelina Topete is a 63 y.o.  female with PMHx significant for COPD, asthma, pulmonary embolism, 
     Nutrition Note    Noted diet order changed to bariatric. This diet is not available at this facility, so RD adjusted orders back to regular diet. Pt had gastric bypass in 1998. She may continue with small frequent meals and snacks if desired, but proper bariatric diet orders are not available at this facility as bariatric surgeries are not performed here.     Feel free to reach out for questions or concerns.     Electronically signed by Shena Canela RD on 1/4/24 at 8:52 AM EST    Contact: [phone]    
  Patient determined to be stable for discharge by attending provider. I have reviewed the discharge instructions and follow-up appointments with the patient. They verbalized understanding and all questions were answered to their satisfaction. No complaints or further questions were expressed.       New medications: Appropriate educational materials and medication side effect teaching were provided.       LINEREMOVE: PIV were removed prior to discharge.     All personal items collected during admission were returned to the patient prior to discharge.      Andrew Thorpe RN    
  Physician Progress Note      PATIENT:               EJ HARLEY  CSN #:                  [id number]  :                       1960  ADMIT DATE:       2023 8:46 PM  DISCH DATE:        2024 2:53 PM  RESPONDING  PROVIDER #:        Delmi Carrero NP          QUERY TEXT:    Delmi  Patient admitted with retroperitoneal bleed, noted to have paroxysmal atrial   fibrillation and is maintained on Apixaban. If possible, please document in   progress notes and discharge summary if you are evaluating and/or treating any   of the following:    The medical record reflects the following:  Risk Factors: COPD, Chronic UTIs  Clinical Indicators: P afib noted  Treatment: holding Apixaban for now. resume when able  Options provided:  -- Secondary hypercoagulable state in a patient with atrial fibrillation  -- Other - I will add my own diagnosis  -- Disagree - Not applicable / Not valid  -- Disagree - Clinically unable to determine / Unknown  -- Refer to Clinical Documentation Reviewer    PROVIDER RESPONSE TEXT:    This patient has secondary hypercoagulable state in a patient with atrial   fibrillation.    Query created by: Chen Wright on 2024 12:57 PM      Electronically signed by:  Delmi Carrero NP 2024 7:29 PM          
1314 Patient notified RN of itching; she reports that this is common with pain medications. NP otified.    1418 One time dose of PO benadryl given, per MD order. Pain management regimen changed to morphine due to itching.     0700 Bedside shift report received from MARCUS Sanchez. Report included the following information Nurse Handoff Report, MAR, Telemetry status, Recent Results, and plan of care. This RN verbalized understanding of plan of care with opportunity for clarification and questions. Care of patient assumed at this time. Dual skin check performed at this time.     Bedside shift report given to  ____MARCUS. Report included the following information Nurse Handoff Report, MAR, Telemetry status, Recent Results, and plan of care. Oncoming RN verbalized understanding of plan of care with opportunity for clarification and questions. Dual skin check performed at this time.     
Bedside and Verbal shift change report given to MARCUS Oconnell (oncoming nurse) by MARCUS Ford (offgoing nurse). Report included the following information Nurse Handoff Report, Index, MAR, Cardiac Rhythm  , and Quality Measures.    
Bedside and Verbal shift change report given to iRa (oncoming nurse) by Franchesca (offgoing nurse). Report included the following information Nurse Handoff Report, Adult Overview, Intake/Output, MAR, Recent Results, and Cardiac Rhythm SR . Patient pain moderately controlled overnight and required Zofran & Benadryl once overnight, condition stable, 4Ps addressed before exiting room.  
Bedside report given to Dana Chapman R.N  
Bedside shift change report given to MARCUS Vargas (oncoming nurse) by MARCUS Claudio (offgoing nurse). Report included the following information Nurse Handoff Report, Index, ED Encounter Summary, ED SBAR, Adult Overview, Intake/Output, MAR, Recent Results, Cardiac Rhythm NSR, Quality Measures, and Neuro Assessment.  Patient tolerated nursing care and change in diet without distress and is currently resting in the room.   
Called by Radiologist Dr. Webster.  He is interpreting the repeat CT scan from this am.  He sees evidence of a retroperitonal hemorrhage with hematoma, not pancreatitis.  Duodenum normal. No penetrating ulcer.   Plan:  Consult surgery, ? Vascular  D/W Dr. Morfin    
Chart reviewed  All source images from CTs reviewed  I do not see indication or benefit from mesenteric angio at this time  VSS and H/H stable  Tolerating po but having lose stools  Abd flat and soft  I would simply favor expectant management and supportive care for now - with cessation of AOC - and a formal CTA in several weeks as outpt or sooner as inpatient is condition warrants.  Defer hematologic management to Dr Ferrer  Available as needed    
End of Shift Note    Bedside shift change report given to MARCUS Ford (oncoming nurse) by Lindsey Huizar LPN (offgoing nurse).  Report included the following information SBAR, Kardex, and MAR    Shift worked:  7p-7:30     Shift summary and any significant changes:     Pt tolerated care fairly well. All pt medications administered per MAR. Pt complained of pain in the abdomen, PRN pain medication administered (see MAR).   New IV placed in R hand, 24g. Pt remained NPO, pt requested for ice chip once.     Pt am labs drawn.      Concerns for physician to address:       Zone phone for oncoming shift:              Lindsey Huizar LPN                           
Occupational Therapy   Chart reviewed; PT cleared patient for discharge; Patient declines OT services due to near baseline mod I. Will complete order. Chata Yanes OTR/L  
Patient requesting ice chips or liquids. Call placed to Dr. Dinh' answering system. Return call received and order updated to include Ice chips for pt  
Physical Therapy  PT evaluation completed; full note to follow.  Patient requires only supervision for all mobility and is able to ambulate 110' with no assistive device. Patient appears to be close to baseline for mobility.  No further PT recommended at this time.  
Spiritual Care Assessment/Progress Note  [facility]    Name: Marcelina Topete MRN: [id number]    Age: 63 y.o.     Sex: female   Language: English     Date: 1/3/2024            Total Time Calculated: 22 min              Spiritual Assessment begun in [facility] CLINICAL OBS  Service Provided For:: Patient not available  Referral/Consult From:: Nurse  Encounter Overview/Reason : Advance Care Planning, Attempted Encounter    Spiritual beliefs:      [] Involved in a lilibeth tradition/spiritual practice:      [] Supported by a lilibeth community:      [] Claims no spiritual orientation:      [] Seeking spiritual identity:           [] Adheres to an individual form of spirituality:      [x] Not able to assess:                Identified resources for coping and support system:   Support System: Unknown       [] Prayer                  [] Devotional reading               [] Music                  [] Guided Imagery     [] Pet visits                                        [] Other: (COMMENT)     Specific area/focus of visit   Encounter:    Crisis:    Spiritual/Emotional needs:    Ritual, Rites and Sacraments:    Grief, Loss, and Adjustments:    Ethics/Mediation:    Behavioral Health:    Palliative Care:    Advance Care Planning:      Plan/Referrals: Continue to visit, (comment)    Narrative:  ACP Assessment:  Received request from IDT Member.  Upon review of chart and communication with care team, patient's decision making abilities are not in question.. Patient was/were present in the room, but was sleeping. Left copy of advance medical directive and booklet \"Your Right to Decide\" along with a note to patient advising her to have nurse page a  if she wishes to have a  return.      Unable to assess for spiritual needs or concerns at this time.      Virginia Andrews MPS, BCC, Staff   [facility]     Paging Service  [phone]      
  Pulse: 99   Resp: 18   Temp: 98.1 °F (36.7 °C)   SpO2: 99%       Intake/Output Summary (Last 24 hours) at 1/2/2024 1200  Last data filed at 1/2/2024 0432  Gross per 24 hour   Intake 600 ml   Output --   Net 600 ml     PHYSICAL EXAM:  General: Sitting up in bed, Alert, cooperative, no acute distress    HEENT: NC, Atraumatic. Anicteric sclerae.  Lungs:  CTA Bilaterally. No Wheezing/Rhonchi/Rales.  Heart:  Regular  rhythm,  No murmur (), No Rubs, No Gallops  Abdomen: Soft, Non distended, Non tender.  +Bowel sounds, no HSM  Extremities: No c/c/e  Neurologic:  Alert and oriented X 3.  No acute neurological distress   Psych:   Good insight. Not anxious nor agitated.    Lab and Radiology Data Reviewed: (see below)    Medications Reviewed: (see below)  PMH/SH reviewed - no change compared to H&P  ________________________________________________________________________  Total time spent with patient: 15 minutes ________________________________________________________________________  Care Plan discussed with:  Patient Y   Family     RN               Consultant:       OLIVIER Powell - CNP     Procedures: see electronic medical records for all procedures/Xrays and details which were not copied into this note but were reviewed prior to creation of Plan.      LABS:  Recent Labs     01/01/24  0101 01/01/24  1016 01/02/24  0902   WBC 7.7  --  6.4   HGB 7.8* 8.4* 8.2*   HCT 25.4* 27.0* 26.3*     --  296     Recent Labs     12/31/23  0334 01/01/24  0100 01/02/24  0902    136 136   K 4.6 4.0 3.2*    105 108   CO2 26 26 31   BUN 5* 4* 3*   MG  --   --  1.4*     Recent Labs     12/31/23  0334 01/01/24  0100   GLOB 3.4 3.0     No results for input(s): \"INR\", \"APTT\" in the last 72 hours.    Invalid input(s): \"PTP\"   No results for input(s): \"TIBC\", \"FERR\" in the last 72 hours.    Invalid input(s): \"FE\", \"PSAT\"   No results found for: \"FOL\", \"RBCF\"  No results for input(s): \"PH\", \"PCO2\", \"PO2\" in the last 72 
consult for anticoagulation management, recommends OP lupus anticoagulant testing, keep off systemic anticoagulation.     4. History of COPD  -not in acute exacerbation  - keep 02 sat greater than 90%  - incentive spirometry  - turn, cough and deep breathe  - continue scheduled and prn nebulizer treatment     5. History of chronic UTIs  - urinalysis on 12/28/23 shows ketones 15, protein 30, negative bacteria, nitrites and leukocytes.  - strict I&O     6. History of anxiety and depression  - continue cymbalta     7. Anemia  - Hgb 8.3 today, was 8.2 yesterday, Iron 13, TIBC 354  - no active bleeding noted  - transfuse for Hgb <7        Medical Decision Making:   I personally reviewed labs: BMP, CBC  I personally reviewed imaging: none  I personally reviewed EKG: SR, non ischemic  Toxic drug monitoring: none  Discussed case with:         Code Status: Full  DVT Prophylaxis: SCD  GI Prophylaxis: Protonix    Subjective:     Chief Complaint / Reason for Physician Visit    Reports episode of diarrhea noted some bloody component. FOBT ordered.    Symptom started after have her 1st regular meal last night    Diet changed to bariatric, diet with notation of lactose intolerant per request    \"I just feel weak and don't have any appetite for breakfast\".      Awaiting Vascular surgery input    Discussed with RN events overnight.     HPI:  Marcelina Topete is a 63 y.o.  female with PMHx significant for COPD, asthma, pulmonary embolism, chronic UTI who presented to the ED on Thursday with chief complaint diffuse abdominal pain.  She stated her pain started on [place] Day and progressively worsened by Thursday.  Initially, her abdominal pain felt like the pain she experienced when she had an ulcer.  It started out as transverse upper abdominal pain lasting for approximately 5 minutes and then when the pain returned it was located in the center of the abdomen and radiated to her back.  She reported that she has a severely high pain 
for anticoagulation management  -      4. History of COPD  -not in acute exacerbation  - keep 02 sat greater than 90%  - incentive spirometry  - turn, cough and deep breathe  - continue scheduled and prn nebulizer treatment     5. History of chronic UTIs  - urinalysis on 12/28/23 shows ketones 15, protein 30, negative bacteria, nitrites and leukocytes.  - strict I&O     6. History of anxiety and depression  - continue cymbalta     7. Anemia  -Hgb 8.6 today, was 9.3 yesterday, Iron 13, TIBC 354  - no active bleeding noted  - transfuse for Hgb <7        Medical Decision Making:   I personally reviewed labs: BMP, CBC (pending)  I personally reviewed imaging: none  I personally reviewed EKG: SR, non ischemic  Toxic drug monitoring:   Discussed case with: attending, consults        Code Status: Full  DVT Prophylaxis: SCD  GI Prophylaxis: Protonix    Subjective:     Chief Complaint / Reason for Physician Visit    \"My abdomen just feel tender now\".      Tolerating full liquid diet, discontinued IVF      Discussed with RN events overnight.     HPI:  Marcelina Tpoete is a 63 y.o.  female with PMHx significant for COPD, asthma, pulmonary embolism, chronic UTI who presented to the ED on Thursday with chief complaint diffuse abdominal pain.  She stated her pain started on [place] Day and progressively worsened by Thursday.  Initially, her abdominal pain felt like the pain she experienced when she had an ulcer.  It started out as transverse upper abdominal pain lasting for approximately 5 minutes and then when the pain returned it was located in the center of the abdomen and radiated to her back.  She reported that she has a severely high pain tolerance but this pain was unlike anything she has ever felt in her life.  She also reported that she has not eaten in the past 2 days and has been experiencing dry heaving.  She ingested some old left over Carafate (6 year old) and Mylanta with very minimal relief.  She called her grandson 
yes       Pain Management:   Patient states pain is manageable on current regimen: yes    Skin:  Homar Scale Score: 20  Interventions: float heels    Patient Safety:  Fall Score:    Interventions: gripper socks     @Rollbelt  @dexterity to release roll belt  Yes/No ( must document dexterity  here by stating Yes or No here, otherwise this is a restraint and must follow restraint documentation policy.)    DVT prophylaxis:  DVT prophylaxis Med- yes  DVT prophylaxis SCD or CATHY- no     Wounds: (If Applicable)  Wounds- no  Location     Active Consults:  IP CONSULT TO HOSPITALIST  IP CONSULT TO GI  IP CONSULT TO VASCULAR SURGERY  IP CONSULT TO ONCOLOGY  IP CONSULT TO SPIRITUAL CARE    Length of Stay:  Expected LOS: 11  Actual LOS: 5  Discharge Plan: yes home when stable      NIKKI GUERIN RN                                
ml/min/1.73m2    Calcium 8.9 8.5 - 10.1 MG/DL    Total Bilirubin 0.4 0.2 - 1.0 MG/DL    ALT 24 12 - 78 U/L    AST 21 15 - 37 U/L    Alk Phosphatase 60 45 - 117 U/L    Total Protein 5.7 (L) 6.4 - 8.2 g/dL    Albumin 2.8 (L) 3.5 - 5.0 g/dL    Globulin 2.9 2.0 - 4.0 g/dL    Albumin/Globulin Ratio 1.0 (L) 1.1 - 2.2     CBC with Auto Differential    Collection Time: 12/30/23  2:31 AM   Result Value Ref Range    WBC 8.2 3.6 - 11.0 K/uL    RBC 3.07 (L) 3.80 - 5.20 M/uL    Hemoglobin 8.6 (L) 11.5 - 16.0 g/dL    Hematocrit 27.4 (L) 35.0 - 47.0 %    MCV 89.3 80.0 - 99.0 FL    MCH 28.0 26.0 - 34.0 PG    MCHC 31.4 30.0 - 36.5 g/dL    RDW 15.1 (H) 11.5 - 14.5 %    Platelets 239 150 - 400 K/uL    MPV 11.1 8.9 - 12.9 FL    Nucleated RBCs 0.0 0  WBC    nRBC 0.00 0.00 - 0.01 K/uL    Neutrophils % 66 32 - 75 %    Lymphocytes % 19 12 - 49 %    Monocytes % 10 5 - 13 %    Eosinophils % 3 0 - 7 %    Basophils % 1 0 - 1 %    Immature Granulocytes 1 (H) 0.0 - 0.5 %    Neutrophils Absolute 5.5 1.8 - 8.0 K/UL    Lymphocytes Absolute 1.6 0.8 - 3.5 K/UL    Monocytes Absolute 0.8 0.0 - 1.0 K/UL    Eosinophils Absolute 0.2 0.0 - 0.4 K/UL    Basophils Absolute 0.1 0.0 - 0.1 K/UL    Absolute Immature Granulocyte 0.1 (H) 0.00 - 0.04 K/UL    Differential Type AUTOMATED     LABRCNT(wbc:2,hgb:2,hct:2,plt:2,)  Recent Labs     12/28/23  1625 12/29/23  0959 12/30/23  0231    144 143   K 3.7 4.1 3.9    110* 109*   CO2 29 26 25   BUN 10 13 9     Recent Labs     12/29/23  0655   INR 1.1      Latest Reference Range & Units 12/28/23 16:25 12/29/23 06:52   Lipase 13 - 75 U/L 59 17     Impression:    Acute Pancreatitis, etiology unclear    Epigastric pain    Abnormal CT    H/O Gastric bypass in 1998 (Dr. Ramos) and last EGD was 2002 for dysphagia         Plan:  Pain was c/w pancreatitis and her CT supported this diagnosis.  Etiology unclear, she is post prior cholecystectomy (nondrinker, no new meds) and lipase was normal.  There could be 
suspicious for areas of hemorrhage. Exact etiology is uncertain  but these findings appear stable since 12/20/2023.. The pancreas appears  relatively normal.Results called by myself    There is a small amount of complex fluid in the cul-de-sac.    Patient is status post gastric bypass and cholecystectomy.      Impression:    Acute upper abdominal pain with new CT evidence for retroperitoneal hemorrhage    H/O Gastric bypass in 1998 (Dr. Mitchell)    Chronic OAC on Eliquis PTA         Plan:  New CT showed retroperitoneal hemorrhage in pt on eliquis on admission and her Hgb dropped after arrival and has mostly remained stable so hopefully this will not require invasive intervention but not sure what was culprit that caused this bleed into the abdomen.  Awaiting vascular surgery input today for guidance on further treatment and then will likely need heme to see since h/o lupus anticoagulant w PE s.  Dr. Castillo to see in am.            Barber Dinh MD    1/1/2024  [facility]  [address], [address]  [place] [address]  Loc: [phone]    
would benefit from IV iron administration. Bleeding has stabilized. Intervention decision will rest with vascular team.    Heart rhythm issues to be taken care by Cardiology.       Plan:       Agree with keeping her off systemic anticoagulation in the near future.   Iron studies and IV iron as needed  Lupus anticoagulant testing after d/c      Signed by: Marquise Ferrer MD                     January 2, 2024

## 2024-01-08 ENCOUNTER — TELEPHONE (OUTPATIENT)
Age: 64
End: 2024-01-08

## 2024-01-08 NOTE — TELEPHONE ENCOUNTER
Pt was seen by  in the hospital calling to schedule a follow up. PT stated if she does not answer you can leave a VM.

## 2024-01-22 ENCOUNTER — HOSPITAL ENCOUNTER (OUTPATIENT)
Facility: HOSPITAL | Age: 64
Setting detail: SPECIMEN
Discharge: HOME | End: 2024-01-25

## 2024-01-22 PROCEDURE — 85025 COMPLETE CBC W/AUTO DIFF WBC: CPT

## 2024-01-22 PROCEDURE — 83550 IRON BINDING TEST: CPT

## 2024-01-22 PROCEDURE — 80053 COMPREHEN METABOLIC PANEL: CPT

## 2024-01-22 PROCEDURE — 80061 LIPID PANEL: CPT

## 2024-01-22 PROCEDURE — 83036 HEMOGLOBIN GLYCOSYLATED A1C: CPT

## 2024-01-22 PROCEDURE — 83735 ASSAY OF MAGNESIUM: CPT

## 2024-01-22 PROCEDURE — 83540 ASSAY OF IRON: CPT

## 2024-01-22 PROCEDURE — 82728 ASSAY OF FERRITIN: CPT

## 2024-01-23 LAB
ALBUMIN SERPL-MCNC: 4 G/DL (ref 3.5–5)
ALBUMIN/GLOB SERPL: 1.3 (ref 1.1–2.2)
ALP SERPL-CCNC: 82 U/L (ref 45–117)
ALT SERPL-CCNC: 13 U/L (ref 12–78)
ANION GAP SERPL CALC-SCNC: 10 MMOL/L (ref 5–15)
AST SERPL-CCNC: 17 U/L (ref 15–37)
BASOPHILS # BLD: 0.1 K/UL (ref 0–0.1)
BASOPHILS NFR BLD: 2 % (ref 0–1)
BILIRUB SERPL-MCNC: 0.4 MG/DL (ref 0.2–1)
BUN SERPL-MCNC: 7 MG/DL (ref 6–20)
BUN/CREAT SERPL: 8 (ref 12–20)
CALCIUM SERPL-MCNC: 9.5 MG/DL (ref 8.5–10.1)
CHLORIDE SERPL-SCNC: 105 MMOL/L (ref 97–108)
CHOLEST SERPL-MCNC: 239 MG/DL
CO2 SERPL-SCNC: 29 MMOL/L (ref 21–32)
CREAT SERPL-MCNC: 0.87 MG/DL (ref 0.55–1.02)
DIFFERENTIAL METHOD BLD: ABNORMAL
EOSINOPHIL # BLD: 0.5 K/UL (ref 0–0.4)
EOSINOPHIL NFR BLD: 9 % (ref 0–7)
ERYTHROCYTE [DISTWIDTH] IN BLOOD BY AUTOMATED COUNT: 16.6 % (ref 11.5–14.5)
EST. AVERAGE GLUCOSE BLD GHB EST-MCNC: 85 MG/DL
FERRITIN SERPL-MCNC: 67 NG/ML (ref 8–252)
GLOBULIN SER CALC-MCNC: 3.1 G/DL (ref 2–4)
GLUCOSE SERPL-MCNC: 78 MG/DL (ref 65–100)
HBA1C MFR BLD: 4.6 % (ref 4–5.6)
HCT VFR BLD AUTO: 44.9 % (ref 35–47)
HDLC SERPL-MCNC: 56 MG/DL
HDLC SERPL: 4.3 (ref 0–5)
HGB BLD-MCNC: 13.3 G/DL (ref 11.5–16)
IMM GRANULOCYTES # BLD AUTO: 0 K/UL (ref 0–0.04)
IMM GRANULOCYTES NFR BLD AUTO: 0 % (ref 0–0.5)
IRON SATN MFR SERPL: 9 % (ref 20–50)
IRON SERPL-MCNC: 41 UG/DL (ref 50–170)
LDLC SERPL CALC-MCNC: 162.4 MG/DL (ref 0–100)
LYMPHOCYTES # BLD: 1.4 K/UL (ref 0.8–3.5)
LYMPHOCYTES NFR BLD: 26 % (ref 12–49)
MAGNESIUM SERPL-MCNC: 2.3 MG/DL (ref 1.6–2.4)
MCH RBC QN AUTO: 26.7 PG (ref 26–34)
MCHC RBC AUTO-ENTMCNC: 29.6 G/DL (ref 30–36.5)
MCV RBC AUTO: 90 FL (ref 80–99)
MONOCYTES # BLD: 0.4 K/UL (ref 0–1)
MONOCYTES NFR BLD: 8 % (ref 5–13)
NEUTS SEG # BLD: 3 K/UL (ref 1.8–8)
NEUTS SEG NFR BLD: 55 % (ref 32–75)
NRBC # BLD: 0 K/UL (ref 0–0.01)
NRBC BLD-RTO: 0 PER 100 WBC
PLATELET # BLD AUTO: 302 K/UL (ref 150–400)
PMV BLD AUTO: 12.6 FL (ref 8.9–12.9)
POTASSIUM SERPL-SCNC: 5.2 MMOL/L (ref 3.5–5.1)
PROT SERPL-MCNC: 7.1 G/DL (ref 6.4–8.2)
RBC # BLD AUTO: 4.99 M/UL (ref 3.8–5.2)
SODIUM SERPL-SCNC: 144 MMOL/L (ref 136–145)
TIBC SERPL-MCNC: 438 UG/DL (ref 250–450)
TRIGL SERPL-MCNC: 103 MG/DL
VLDLC SERPL CALC-MCNC: 20.6 MG/DL
WBC # BLD AUTO: 5.5 K/UL (ref 3.6–11)

## 2024-02-06 NOTE — PROGRESS NOTES
Marcelina Topete is a 63 y.o. female here for new patient appt for anemia.  Referred by Delmi Carrero  Labs done 1/22/24.  Gastric Bypass in 1998  Last blood transfusion was about 20 years ago. Iron transfusion about 18 years ago.   Labs seemed to be ok after menstrual cycles stopped.   No colonoscopies  She has been taking oral iron since hospital visit this past January.     1. Have you been to the ER, urgent care clinic since your last visit?  Hospitalized since your last visit?  New Pt    2. Have you seen or consulted any other health care providers outside of the [facility] since your last visit?  Include any pap smears or colon screening. New Pt

## 2024-02-07 ENCOUNTER — OFFICE VISIT (OUTPATIENT)
Age: 64
End: 2024-02-07
Payer: MEDICARE

## 2024-02-07 VITALS
SYSTOLIC BLOOD PRESSURE: 133 MMHG | DIASTOLIC BLOOD PRESSURE: 73 MMHG | BODY MASS INDEX: 36.61 KG/M2 | HEIGHT: 66 IN | HEART RATE: 68 BPM | TEMPERATURE: 98 F | OXYGEN SATURATION: 92 % | WEIGHT: 227.8 LBS

## 2024-02-07 DIAGNOSIS — K91.89 IRON DEFICIENCY ANEMIA AFTER GASTRECTOMY: Primary | ICD-10-CM

## 2024-02-07 DIAGNOSIS — D50.8 IRON DEFICIENCY ANEMIA AFTER GASTRECTOMY: Primary | ICD-10-CM

## 2024-02-07 PROCEDURE — 99204 OFFICE O/P NEW MOD 45 MIN: CPT | Performed by: INTERNAL MEDICINE

## 2024-02-07 PROCEDURE — G8484 FLU IMMUNIZE NO ADMIN: HCPCS | Performed by: INTERNAL MEDICINE

## 2024-02-07 PROCEDURE — G8427 DOCREV CUR MEDS BY ELIG CLIN: HCPCS | Performed by: INTERNAL MEDICINE

## 2024-02-07 PROCEDURE — 4004F PT TOBACCO SCREEN RCVD TLK: CPT | Performed by: INTERNAL MEDICINE

## 2024-02-07 PROCEDURE — G8419 CALC BMI OUT NRM PARAM NOF/U: HCPCS | Performed by: INTERNAL MEDICINE

## 2024-02-07 PROCEDURE — 3017F COLORECTAL CA SCREEN DOC REV: CPT | Performed by: INTERNAL MEDICINE

## 2024-02-07 RX ORDER — FLUTICASONE FUROATE AND VILANTEROL 200; 25 UG/1; UG/1
POWDER RESPIRATORY (INHALATION) DAILY
COMMUNITY

## 2024-02-07 RX ORDER — CALCIUM CARBONATE 500 MG/1
1 TABLET, CHEWABLE ORAL DAILY
COMMUNITY

## 2024-02-07 RX ORDER — SULFAMETHOXAZOLE AND TRIMETHOPRIM 400; 80 MG/1; MG/1
TABLET ORAL
COMMUNITY

## 2024-02-07 RX ORDER — OMEPRAZOLE 20 MG/1
20 CAPSULE, DELAYED RELEASE ORAL DAILY
COMMUNITY

## 2024-02-07 NOTE — PROGRESS NOTES
[facility] [place]  at [facility]  [address], [address], [address]  [place] [address] [phone]    Hematology Note        Patient: Marcelina Topete MRN: [id number]  SSN: [id number]    YOB: 1960  Age: 63 y.o.  Sex: female      Subjective:      Marcelina Topete is a 63 y.o. female who I am seeing in consultation for iron deficiency anemia. She has suffered with iron deficiency anemia for over 25 yrs. She feels fatigued. Denies active rectal bleeding. He does not tolerate oral iron supplement due to constipation and nausea. She has a history of gastric bypass.       Review of Systems:  Constitutional: negative  Eyes: negative  Ears, Nose, Mouth, Throat, and Face: negative  Respiratory: negative  Cardiovascular: negative  Gastrointestinal: negative  Genitourinary:negative  Integument/Breast: negative  Hematologic/Lymphatic: negative  Musculoskeletal:negative  Neurological: negative      Past Medical History:   Diagnosis Date    Anemia 3/15/2013    Arrhythmia     paroxsimal afib    Asthma 3/15/2013    Asthma     Back disorder     disc problem    Chronic obstructive pulmonary disease (HCC)     Menopause     Morbid obesity (HCC) 3/15/2013     Past Surgical History:   Procedure Laterality Date    ADENOIDECTOMY      APPENDECTOMY      GASTRIC BYPASS SURGERY  1-15-98    Dr. Mitchell    GASTRIC BYPASS SURGERY      HERNIA REPAIR      ORTHOPEDIC SURGERY      arm fracture    TONSILLECTOMY      TUBAL LIGATION  82    TUBAL LIGATION        Family History   Problem Relation Age of Onset    Cancer Father         pancreatic    Breast Cancer Sister         age dx 56    Heart Disease Mother     Bleeding Prob Mother     Other Mother         fem pop bypass    Cancer Brother     Cancer Sister         lung     Social History     Tobacco Use    Smoking status: Never    Smokeless tobacco: Never   Substance Use Topics    Alcohol use: Yes      Prior to Admission medications

## 2024-03-13 ENCOUNTER — HOSPITAL ENCOUNTER (OUTPATIENT)
Facility: HOSPITAL | Age: 64
Setting detail: SPECIMEN
Discharge: HOME | End: 2024-03-16

## 2024-03-13 PROCEDURE — 87086 URINE CULTURE/COLONY COUNT: CPT

## 2024-03-15 LAB
BACTERIA SPEC CULT: NORMAL
SERVICE CMNT-IMP: NORMAL

## 2024-05-20 ENCOUNTER — HOSPITAL ENCOUNTER (OUTPATIENT)
Facility: HOSPITAL | Age: 64
Setting detail: SPECIMEN
Discharge: HOME | End: 2024-05-23

## 2024-05-20 PROCEDURE — 80061 LIPID PANEL: CPT

## 2024-05-20 PROCEDURE — 83550 IRON BINDING TEST: CPT

## 2024-05-20 PROCEDURE — 86140 C-REACTIVE PROTEIN: CPT

## 2024-05-20 PROCEDURE — 85652 RBC SED RATE AUTOMATED: CPT

## 2024-05-20 PROCEDURE — 82728 ASSAY OF FERRITIN: CPT

## 2024-05-20 PROCEDURE — 84443 ASSAY THYROID STIM HORMONE: CPT

## 2024-05-20 PROCEDURE — 83036 HEMOGLOBIN GLYCOSYLATED A1C: CPT

## 2024-05-20 PROCEDURE — 83540 ASSAY OF IRON: CPT

## 2024-05-20 PROCEDURE — 85025 COMPLETE CBC W/AUTO DIFF WBC: CPT

## 2024-05-20 PROCEDURE — 80053 COMPREHEN METABOLIC PANEL: CPT

## 2024-05-21 DIAGNOSIS — R10.9 ABDOMINAL PAIN, UNSPECIFIED ABDOMINAL LOCATION: Primary | ICD-10-CM

## 2024-05-21 DIAGNOSIS — R58 RETROPERITONEAL HEMORRHAGE: ICD-10-CM

## 2024-05-21 LAB
ALBUMIN SERPL-MCNC: 4 G/DL (ref 3.5–5)
ALBUMIN/GLOB SERPL: 1.5 (ref 1.1–2.2)
ALP SERPL-CCNC: 79 U/L (ref 45–117)
ALT SERPL-CCNC: 15 U/L (ref 12–78)
ANION GAP SERPL CALC-SCNC: 8 MMOL/L (ref 5–15)
AST SERPL-CCNC: 15 U/L (ref 15–37)
BASOPHILS # BLD: 0.1 K/UL (ref 0–0.1)
BASOPHILS NFR BLD: 1 % (ref 0–1)
BILIRUB SERPL-MCNC: 0.7 MG/DL (ref 0.2–1)
BUN SERPL-MCNC: 11 MG/DL (ref 6–20)
BUN/CREAT SERPL: 14 (ref 12–20)
CALCIUM SERPL-MCNC: 9 MG/DL (ref 8.5–10.1)
CHLORIDE SERPL-SCNC: 106 MMOL/L (ref 97–108)
CHOLEST SERPL-MCNC: 237 MG/DL
CO2 SERPL-SCNC: 30 MMOL/L (ref 21–32)
CREAT SERPL-MCNC: 0.79 MG/DL (ref 0.55–1.02)
CRP SERPL-MCNC: <0.29 MG/DL (ref 0–0.3)
DIFFERENTIAL METHOD BLD: ABNORMAL
EOSINOPHIL # BLD: 0.7 K/UL (ref 0–0.4)
EOSINOPHIL NFR BLD: 10 % (ref 0–7)
ERYTHROCYTE [DISTWIDTH] IN BLOOD BY AUTOMATED COUNT: 16.3 % (ref 11.5–14.5)
ERYTHROCYTE [SEDIMENTATION RATE] IN BLOOD: 3 MM/HR (ref 0–30)
EST. AVERAGE GLUCOSE BLD GHB EST-MCNC: 97 MG/DL
FERRITIN SERPL-MCNC: 39 NG/ML (ref 8–252)
GLOBULIN SER CALC-MCNC: 2.7 G/DL (ref 2–4)
GLUCOSE SERPL-MCNC: 87 MG/DL (ref 65–100)
HBA1C MFR BLD: 5 % (ref 4–5.6)
HCT VFR BLD AUTO: 45.8 % (ref 35–47)
HDLC SERPL-MCNC: 71 MG/DL
HDLC SERPL: 3.3 (ref 0–5)
HGB BLD-MCNC: 14.6 G/DL (ref 11.5–16)
IMM GRANULOCYTES # BLD AUTO: 0 K/UL (ref 0–0.04)
IMM GRANULOCYTES NFR BLD AUTO: 0 % (ref 0–0.5)
IRON SATN MFR SERPL: 24 % (ref 20–50)
IRON SERPL-MCNC: 89 UG/DL (ref 50–170)
LDLC SERPL CALC-MCNC: 148.2 MG/DL (ref 0–100)
LYMPHOCYTES # BLD: 1.5 K/UL (ref 0.8–3.5)
LYMPHOCYTES NFR BLD: 23 % (ref 12–49)
MCH RBC QN AUTO: 29.7 PG (ref 26–34)
MCHC RBC AUTO-ENTMCNC: 31.9 G/DL (ref 30–36.5)
MCV RBC AUTO: 93.1 FL (ref 80–99)
MONOCYTES # BLD: 0.5 K/UL (ref 0–1)
MONOCYTES NFR BLD: 7 % (ref 5–13)
NEUTS SEG # BLD: 3.8 K/UL (ref 1.8–8)
NEUTS SEG NFR BLD: 59 % (ref 32–75)
NRBC # BLD: 0 K/UL (ref 0–0.01)
NRBC BLD-RTO: 0 PER 100 WBC
PLATELET # BLD AUTO: 200 K/UL (ref 150–400)
PLATELET COMMENT: ABNORMAL
POTASSIUM SERPL-SCNC: 4.4 MMOL/L (ref 3.5–5.1)
PROT SERPL-MCNC: 6.7 G/DL (ref 6.4–8.2)
RBC # BLD AUTO: 4.92 M/UL (ref 3.8–5.2)
RBC MORPH BLD: ABNORMAL
SODIUM SERPL-SCNC: 144 MMOL/L (ref 136–145)
TIBC SERPL-MCNC: 372 UG/DL (ref 250–450)
TRIGL SERPL-MCNC: 89 MG/DL
TSH SERPL DL<=0.05 MIU/L-ACNC: 1.69 UIU/ML (ref 0.36–3.74)
VLDLC SERPL CALC-MCNC: 17.8 MG/DL
WBC # BLD AUTO: 6.6 K/UL (ref 3.6–11)

## 2024-05-24 ENCOUNTER — HOSPITAL ENCOUNTER (OUTPATIENT)
Facility: HOSPITAL | Age: 64
End: 2024-05-24
Payer: MEDICARE

## 2024-05-24 DIAGNOSIS — R10.9 ABDOMINAL PAIN, UNSPECIFIED ABDOMINAL LOCATION: ICD-10-CM

## 2024-05-24 DIAGNOSIS — R58 RETROPERITONEAL HEMORRHAGE: ICD-10-CM

## 2024-05-24 PROCEDURE — 6360000004 HC RX CONTRAST MEDICATION: Performed by: FAMILY MEDICINE

## 2024-05-24 PROCEDURE — 74178 CT ABD&PLV WO CNTR FLWD CNTR: CPT

## 2024-05-24 RX ADMIN — IOHEXOL 50 ML: 240 INJECTION, SOLUTION INTRATHECAL; INTRAVASCULAR; INTRAVENOUS; ORAL at 10:10

## 2024-05-24 RX ADMIN — IOPAMIDOL 100 ML: 612 INJECTION, SOLUTION INTRAVENOUS at 11:17

## 2024-07-16 ENCOUNTER — HOSPITAL ENCOUNTER (OUTPATIENT)
Facility: HOSPITAL | Age: 64
Discharge: HOME | End: 2024-07-19

## 2024-07-16 LAB — FERRITIN SERPL-MCNC: 20 NG/ML (ref 8–252)

## 2024-07-16 PROCEDURE — 36415 COLL VENOUS BLD VENIPUNCTURE: CPT

## 2024-07-16 PROCEDURE — 84207 ASSAY OF VITAMIN B-6: CPT

## 2024-07-16 PROCEDURE — 82728 ASSAY OF FERRITIN: CPT

## 2024-07-16 PROCEDURE — 82607 VITAMIN B-12: CPT

## 2024-07-16 PROCEDURE — 82306 VITAMIN D 25 HYDROXY: CPT

## 2024-07-16 PROCEDURE — 82746 ASSAY OF FOLIC ACID SERUM: CPT

## 2024-07-16 PROCEDURE — 82103 ALPHA-1-ANTITRYPSIN TOTAL: CPT

## 2024-07-17 DIAGNOSIS — M85.80 STEROID-INDUCED OSTEOPENIA: ICD-10-CM

## 2024-07-17 DIAGNOSIS — Z78.0 ASYMPTOMATIC MENOPAUSAL STATE: Primary | ICD-10-CM

## 2024-07-17 DIAGNOSIS — T38.0X5A STEROID-INDUCED OSTEOPENIA: ICD-10-CM

## 2024-07-18 LAB
25(OH)D3 SERPL-MCNC: 51.6 NG/ML (ref 30–100)
FOLATE SERPL-MCNC: 7.7 NG/ML (ref 5–21)
Lab: NORMAL
Lab: NORMAL
REFERENCE LAB: NORMAL
VIT B12 SERPL-MCNC: 392 PG/ML (ref 193–986)

## 2024-07-19 LAB — A1AT SERPL-MCNC: 150 MG/DL (ref 101–187)

## 2024-07-20 LAB — VIT B6 SERPL-MCNC: 5.8 UG/L (ref 3.4–65.2)

## 2024-08-06 ENCOUNTER — HOSPITAL ENCOUNTER (OUTPATIENT)
Facility: HOSPITAL | Age: 64
Discharge: HOME | End: 2024-08-09
Payer: MEDICARE

## 2024-08-06 VITALS — BODY MASS INDEX: 36.64 KG/M2 | WEIGHT: 227 LBS

## 2024-08-06 DIAGNOSIS — T38.0X5A STEROID-INDUCED OSTEOPENIA: ICD-10-CM

## 2024-08-06 DIAGNOSIS — Z78.0 ASYMPTOMATIC MENOPAUSAL STATE: ICD-10-CM

## 2024-08-06 DIAGNOSIS — M85.80 STEROID-INDUCED OSTEOPENIA: ICD-10-CM

## 2024-08-06 DIAGNOSIS — Z12.31 SCREENING MAMMOGRAM FOR BREAST CANCER: ICD-10-CM

## 2024-08-06 PROCEDURE — 77080 DXA BONE DENSITY AXIAL: CPT

## 2024-08-06 PROCEDURE — 77063 BREAST TOMOSYNTHESIS BI: CPT

## 2024-08-14 NOTE — PROGRESS NOTES
Marcelina Topete is a 63 y.o. female here for 6 month follow up appt for anemia.  Last few weeks she was having generalized aches and UTI's.  Says she is feeling better now.   Lupus anticoagulant test?  Stopped taking oral iron due to constipation.     1. Have you been to the ER, urgent care clinic since your last visit?  Hospitalized since your last visit? no    2. Have you seen or consulted any other health care providers outside of the [facility] since your last visit?  Include any pap smears or colon screening. Gastroenterologist - colonoscopy and EGD will be on the 27th.

## 2024-08-19 ENCOUNTER — OFFICE VISIT (OUTPATIENT)
Age: 64
End: 2024-08-19
Payer: MEDICARE

## 2024-08-19 VITALS
SYSTOLIC BLOOD PRESSURE: 98 MMHG | OXYGEN SATURATION: 93 % | TEMPERATURE: 98 F | BODY MASS INDEX: 31.12 KG/M2 | HEART RATE: 69 BPM | HEIGHT: 66 IN | WEIGHT: 193.6 LBS | DIASTOLIC BLOOD PRESSURE: 62 MMHG

## 2024-08-19 DIAGNOSIS — D50.8 IRON DEFICIENCY ANEMIA AFTER GASTRECTOMY: Primary | ICD-10-CM

## 2024-08-19 DIAGNOSIS — K91.89 IRON DEFICIENCY ANEMIA AFTER GASTRECTOMY: Primary | ICD-10-CM

## 2024-08-19 PROCEDURE — 99213 OFFICE O/P EST LOW 20 MIN: CPT | Performed by: INTERNAL MEDICINE

## 2024-08-19 PROCEDURE — 1036F TOBACCO NON-USER: CPT | Performed by: INTERNAL MEDICINE

## 2024-08-19 PROCEDURE — G8428 CUR MEDS NOT DOCUMENT: HCPCS | Performed by: INTERNAL MEDICINE

## 2024-08-19 PROCEDURE — 3017F COLORECTAL CA SCREEN DOC REV: CPT | Performed by: INTERNAL MEDICINE

## 2024-08-19 PROCEDURE — G8417 CALC BMI ABV UP PARAM F/U: HCPCS | Performed by: INTERNAL MEDICINE

## 2024-08-19 RX ORDER — OMEPRAZOLE 40 MG/1
40 CAPSULE, DELAYED RELEASE ORAL DAILY
COMMUNITY

## 2024-08-19 NOTE — PROGRESS NOTES
[facility] [place]  at [facility]  [address], [address], [address]  [place] [address] [phone]    Hematology Note        Patient: Marcelina Topete MRN: [id number]  SSN: [id number]    YOB: 1960  Age: 63 y.o.  Sex: female      Subjective:      Marcelina Topete is a 63 y.o. female who I am seeing in consultation for iron deficiency anemia. She has suffered with iron deficiency anemia for over 25 yrs. She feels fatigued. Denies active rectal bleeding. He does not tolerate oral iron supplement due to constipation and nausea. She has a history of gastric bypass.       Review of Systems:  Constitutional: negative  Eyes: negative  Ears, Nose, Mouth, Throat, and Face: negative  Respiratory: negative  Cardiovascular: negative  Gastrointestinal: negative  Genitourinary:negative  Integument/Breast: negative  Hematologic/Lymphatic: negative  Musculoskeletal:negative  Neurological: negative      Past Medical History:   Diagnosis Date    Anemia 3/15/2013    Arrhythmia     paroxsimal afib    Asthma 3/15/2013    Asthma     Back disorder     disc problem    Chronic obstructive pulmonary disease (HCC)     Menopause     Morbid obesity (HCC) 3/15/2013     Past Surgical History:   Procedure Laterality Date    ADENOIDECTOMY      APPENDECTOMY      GASTRIC BYPASS SURGERY  1-15-98    Dr. Mitchell    GASTRIC BYPASS SURGERY      HERNIA REPAIR      ORTHOPEDIC SURGERY      arm fracture    TONSILLECTOMY      TUBAL LIGATION  82    TUBAL LIGATION        Family History   Problem Relation Age of Onset    Cancer Father         pancreatic    Breast Cancer Sister         age dx 56    Heart Disease Mother     Bleeding Prob Mother     Other Mother         fem pop bypass    Cancer Brother     Cancer Sister         lung     Social History     Tobacco Use    Smoking status: Never    Smokeless tobacco: Never   Substance Use Topics    Alcohol use: Yes      Prior to Admission medications  Reconciliation, Ar   cyanocobalamin 1000 MCG tablet Take 1 tablet by mouth daily    Automatic Reconciliation, Ar   cyclobenzaprine (FLEXERIL) 10 MG tablet Take 10 mg by mouth 3 times daily as needed  Patient not taking: Reported on 12/29/2023 7/5/16   Automatic Reconciliation, Ar   DULoxetine (CYMBALTA) 30 MG extended release capsule Take 3 capsules by mouth daily    Automatic Reconciliation, Ar   ferrous sulfate (SLOW FE) 142 (45 Fe) MG extended release tablet Take 45 mg by mouth every morning (before breakfast)  Patient not taking: Reported on 8/19/2024    Automatic Reconciliation, Ar   ibuprofen (ADVIL;MOTRIN) 200 MG tablet Take by mouth    Automatic Reconciliation, Ar   mometasone-formoterol (DULERA) 200-5 MCG/ACT inhaler Inhale 2 puffs into the lungs 2 times daily 3/13/17   Automatic Reconciliation, Ar   nortriptyline (PAMELOR) 25 MG capsule Take 2 capsules by mouth 8/4/17   Automatic Reconciliation, Ar   zolpidem (AMBIEN) 10 MG tablet Take 1 tablet by mouth. 2/27/18   Automatic Reconciliation, Ar            Allergies   Allergen Reactions    Penicillins Rash and Swelling     Other reaction(s): Rash  Other reaction(s): Rash      Gabapentin Other (See Comments)     Hallucinations/ sleep walking    Griseofulvin Other (See Comments)     Jagdish-josep syndrome    Tramadol Nausea Only     Other reaction(s): Drowsiness  Other reaction(s): Drowsiness  Interaction with Effexor  Interaction with Effexor           Objective:     Vitals:    08/19/24 1139   BP: 98/62   Site: Left Upper Arm   Position: Sitting   Pulse: 69   Temp: 98 °F (36.7 °C)   TempSrc: Temporal   SpO2: 93%   Weight: 87.8 kg (193 lb 9.6 oz)   Height: 1.676 m (5' 6\")        PHYSICAL EXAM:  GENERAL: alert, cooperative, no distress, appears stated age  EYE: conjunctivae/corneas clear  LYMPHATIC: Cervical, supraclavicular, and axillary nodes normal.   THROAT & NECK: normal and no erythema or exudates noted.   LUNG: clear to auscultation bilaterally  HEART:

## 2024-09-25 ENCOUNTER — TELEPHONE (OUTPATIENT)
Age: 64
End: 2024-09-25

## 2025-05-22 ENCOUNTER — HOSPITAL ENCOUNTER (OUTPATIENT)
Facility: HOSPITAL | Age: 65
Setting detail: SPECIMEN
Discharge: HOME | End: 2025-05-25

## 2025-05-22 PROCEDURE — 80061 LIPID PANEL: CPT

## 2025-05-22 PROCEDURE — 83036 HEMOGLOBIN GLYCOSYLATED A1C: CPT

## 2025-05-22 PROCEDURE — 85025 COMPLETE CBC W/AUTO DIFF WBC: CPT

## 2025-05-22 PROCEDURE — 80053 COMPREHEN METABOLIC PANEL: CPT

## 2025-05-22 PROCEDURE — 84443 ASSAY THYROID STIM HORMONE: CPT

## 2025-05-23 LAB
ALBUMIN SERPL-MCNC: 3.8 G/DL (ref 3.5–5)
ALBUMIN/GLOB SERPL: 1.5 (ref 1.1–2.2)
ALP SERPL-CCNC: 58 U/L (ref 45–117)
ALT SERPL-CCNC: 31 U/L (ref 12–78)
ANION GAP SERPL CALC-SCNC: 6 MMOL/L (ref 2–12)
AST SERPL-CCNC: 21 U/L (ref 15–37)
BASOPHILS # BLD: 0.06 K/UL (ref 0–0.1)
BASOPHILS NFR BLD: 1.1 % (ref 0–1)
BILIRUB SERPL-MCNC: 0.6 MG/DL (ref 0.2–1)
BUN SERPL-MCNC: 10 MG/DL (ref 6–20)
BUN/CREAT SERPL: 14 (ref 12–20)
CALCIUM SERPL-MCNC: 9.3 MG/DL (ref 8.5–10.1)
CHLORIDE SERPL-SCNC: 107 MMOL/L (ref 97–108)
CHOLEST SERPL-MCNC: 246 MG/DL
CO2 SERPL-SCNC: 31 MMOL/L (ref 21–32)
CREAT SERPL-MCNC: 0.7 MG/DL (ref 0.55–1.02)
DIFFERENTIAL METHOD BLD: ABNORMAL
EOSINOPHIL # BLD: 0.54 K/UL (ref 0–0.4)
EOSINOPHIL NFR BLD: 9.8 % (ref 0–0.7)
ERYTHROCYTE [DISTWIDTH] IN BLOOD BY AUTOMATED COUNT: 12.8 % (ref 11.5–14.5)
EST. AVERAGE GLUCOSE BLD GHB EST-MCNC: 97 MG/DL
GLOBULIN SER CALC-MCNC: 2.5 G/DL (ref 2–4)
GLUCOSE SERPL-MCNC: 80 MG/DL (ref 65–100)
HBA1C MFR BLD: 5 % (ref 4–5.6)
HCT VFR BLD AUTO: 45.1 % (ref 35–47)
HDLC SERPL-MCNC: 85 MG/DL
HDLC SERPL: 2.9 (ref 0–5)
HGB BLD-MCNC: 14.5 G/DL (ref 11.5–16)
IMM GRANULOCYTES # BLD AUTO: 0.01 K/UL (ref 0–0.04)
IMM GRANULOCYTES NFR BLD AUTO: 0.2 % (ref 0–0.5)
LDLC SERPL CALC-MCNC: 147 MG/DL (ref 0–100)
LYMPHOCYTES # BLD: 1.4 K/UL (ref 0.8–3.5)
LYMPHOCYTES NFR BLD: 25.4 % (ref 12–49)
MCH RBC QN AUTO: 31.9 PG (ref 26–34)
MCHC RBC AUTO-ENTMCNC: 32.2 G/DL (ref 30–36.5)
MCV RBC AUTO: 99.1 FL (ref 80–99)
MONOCYTES # BLD: 0.4 K/UL (ref 0–1)
MONOCYTES NFR BLD: 7.3 % (ref 5–13)
NEUTS SEG # BLD: 3.1 K/UL (ref 1.8–8)
NEUTS SEG NFR BLD: 56.2 % (ref 32–75)
NRBC # BLD: 0 K/UL (ref 0–0.01)
NRBC BLD-RTO: 0 PER 100 WBC
PLATELET # BLD AUTO: 241 K/UL (ref 150–400)
PMV BLD AUTO: 12.3 FL (ref 8.9–12.9)
POTASSIUM SERPL-SCNC: 5.2 MMOL/L (ref 3.5–5.1)
PROT SERPL-MCNC: 6.3 G/DL (ref 6.4–8.2)
RBC # BLD AUTO: 4.55 M/UL (ref 3.8–5.2)
SODIUM SERPL-SCNC: 144 MMOL/L (ref 136–145)
TRIGL SERPL-MCNC: 70 MG/DL
TSH SERPL DL<=0.05 MIU/L-ACNC: 1.79 UIU/ML (ref 0.36–3.74)
VLDLC SERPL CALC-MCNC: 14 MG/DL
WBC # BLD AUTO: 5.5 K/UL (ref 3.6–11)